# Patient Record
Sex: FEMALE | NOT HISPANIC OR LATINO | Employment: UNEMPLOYED | URBAN - METROPOLITAN AREA
[De-identification: names, ages, dates, MRNs, and addresses within clinical notes are randomized per-mention and may not be internally consistent; named-entity substitution may affect disease eponyms.]

---

## 2019-05-29 ENCOUNTER — COMMUNICATION - HEALTHEAST (OUTPATIENT)
Dept: SCHEDULING | Facility: CLINIC | Age: 24
End: 2019-05-29

## 2020-06-04 LAB — PHQ9 SCORE: 8

## 2021-01-14 LAB — PHQ9 SCORE: 16

## 2021-05-29 NOTE — TELEPHONE ENCOUNTER
"RN Triage:    Slipped and sustained a laceration in gap between right pinkie toe and adjoining toe about 15 minutes ago.  Friend says the cut is deep and gaping.  Still bleeding, though slowing down, after 15 minutes.  \"I'm afraid of blood.\"  Home care discussed.  She plans to go to the [facility] [place] for evaluation now.  Friend will drive her.    Kecia Henley, RN   [facility]    Reason for Disposition    Skin is split open or gaping  (or length > 1/2 inch or 12 mm on the skin, 1/4 inch or 6 mm on the face)    Protocols used: CUTS AND [id number]-GR      "

## 2021-06-11 ENCOUNTER — COMMUNICATION - HEALTHEAST (OUTPATIENT)
Dept: SCHEDULING | Facility: CLINIC | Age: 26
End: 2021-06-11

## 2021-06-25 NOTE — TELEPHONE ENCOUNTER
Abdominal cramps lead to diarrhea. Stomach is horrible.Vomited this morning too. I connected with scheduling for an appointment and advised urgent care if they can't get her in.  Lindsey Shah RN  [facility]      Reason for Disposition    Abdominal pain  (Exception: pain clears completely with each passage of diarrhea stool)    Additional Information    Negative: Shock suspected (e.g., cold/pale/clammy skin, too weak to stand, low BP, rapid pulse)    Negative: Difficult to awaken or acting confused (e.g., disoriented, slurred speech)    Negative: Sounds like a life-threatening emergency to the triager    Negative: Vomiting also present and worse than the diarrhea    Negative: Blood in stool and without diarrhea    Negative: SEVERE abdominal pain (e.g., excruciating) and present > 1 hour     Pain at 4.    Negative: SEVERE abdominal pain and age > 60    Negative: Bloody, black, or tarry bowel movements (Exception: chronic-unchanged black-grey bowel movements and is taking iron pills or Pepto-bismol)    Negative: SEVERE diarrhea (e.g., 7 or more times / day more than normal) and age > 60 years    Negative: Constant abdominal pain lasting > 2 hours    Negative: Drinking very little and has signs of dehydration (e.g., no urine > 12 hours, very dry mouth, very lightheaded)    Negative: Patient sounds very sick or weak to the triager    Negative: SEVERE diarrhea (e.g., 7 or more times / day more than normal) and present > 24 hours (1 day)    Negative: MODERATE diarrhea (e.g., 4-6 times / day more than normal) and present > 48 hours (2 days)    Negative: MODERATE diarrhea (e.g., 4-6 times / day more than normal) and age > 70 years    Protocols used: DIARRHEA-A-OH

## 2021-07-23 LAB
ABO (EXTERNAL): NORMAL
C TRACH DNA SPEC QL PROBE+SIG AMP: NEGATIVE
HEMOGLOBIN (EXTERNAL): 12.5 G/DL (ref 12–15.5)
HEPATITIS B SURFACE ANTIGEN (EXTERNAL): NEGATIVE
N GONORRHOEA DNA SPEC QL PROBE+SIG AMP: NEGATIVE
PLATELET COUNT (EXTERNAL): 296 10E3/UL (ref 150–450)
RH (EXTERNAL): POSITIVE
RUBELLA ANTIBODY IGG (EXTERNAL): NORMAL
SPECIMEN DESCRIP: NORMAL
SPECIMEN DESCRIPTION: NORMAL

## 2021-12-07 ENCOUNTER — HOSPITAL ENCOUNTER (INPATIENT)
Facility: CLINIC | Age: 26
Setting detail: SURGERY ADMIT
End: 2021-12-07
Attending: OBSTETRICS & GYNECOLOGY | Admitting: OBSTETRICS & GYNECOLOGY
Payer: COMMERCIAL

## 2021-12-16 ENCOUNTER — HOSPITAL ENCOUNTER (OUTPATIENT)
Facility: HOSPITAL | Age: 26
Discharge: HOME | End: 2021-12-16
Attending: OBSTETRICS & GYNECOLOGY | Admitting: OBSTETRICS & GYNECOLOGY
Payer: COMMERCIAL

## 2021-12-16 VITALS
SYSTOLIC BLOOD PRESSURE: 114 MMHG | RESPIRATION RATE: 16 BRPM | DIASTOLIC BLOOD PRESSURE: 66 MMHG | TEMPERATURE: 98.5 F | WEIGHT: 194 LBS | HEIGHT: 59 IN | BODY MASS INDEX: 39.11 KG/M2

## 2021-12-16 PROCEDURE — G0463 HOSPITAL OUTPT CLINIC VISIT: HCPCS

## 2021-12-16 RX ORDER — LIDOCAINE 40 MG/G
CREAM TOPICAL
Status: DISCONTINUED | OUTPATIENT
Start: 2021-12-16 | End: 2021-12-16 | Stop reason: HOSPADM

## 2021-12-16 ASSESSMENT — MIFFLIN-ST. JEOR: SCORE: 1530.61

## 2021-12-17 NOTE — DISCHARGE INSTRUCTIONS
Discharge Instruction for Undelivered Patients      You were seen for: Fetal Assessment  We Consulted: Dr De Leon  You had (Test or Medicine):NST- Reactive     Diet:   You may eat meals and snacks.     Activity:  Call your doctor or nurse midwife if your baby is moving less than usual.     Call your provider if you notice:  Swelling in your face or increased swelling in your hands or legs.  Headaches that are not relieved by Tylenol (acetaminophen).  Changes in your vision (blurring: seeing spots or stars.)  Nausea (sick to your stomach) and vomiting (throwing up).   Weight gain of 5 pounds or more per week.  Heartburn that doesn't go away.  Signs of bladder infection: pain when you urinate (use the toilet), need to go more often and more urgently.  The bag of boyle (rupture of membranes) breaks, or you notice leaking in your underwear.  Bright red blood in your underwear.  Abdominal (lower belly) or stomach pain.  For first baby: Contractions (tightening) less than 5 minutes apart for one hour or more.  Second (plus) baby: Contractions (tightening) less than 10 minutes apart and getting stronger.  *If less than 34 weeks: Contractions (tightening) more than 6 times in one hour.  Increase or change in vaginal discharge (note the color and amount)    Follow-up:  As scheduled in the clinic

## 2021-12-17 NOTE — PROGRESS NOTES
Patient fell up the stairs and lightly hit left side of abdomen on steps at 1500.  Pt states she 'still has some muscular discomfort but feels she twisted when she fell'.  NST done. Vitals stable. Dr De Leon contacted- OK to remove pt from the monitor at 1900 (4 hours after fall) as long as baby is reactive.  Pt and  educated to call provider if bleeding, leaking fluid, pain, change in fetal movement. Pt in agreement with plan and discharged home ambulatory with   Linsey Dc RN on 12/16/2021 at 7:14 PM

## 2022-01-05 ENCOUNTER — HOME INFUSION (PRE-WILLOW HOME INFUSION) (OUTPATIENT)
Dept: PHARMACY | Facility: CLINIC | Age: 27
End: 2022-01-05
Payer: COMMERCIAL

## 2022-01-10 ENCOUNTER — HOME INFUSION (PRE-WILLOW HOME INFUSION) (OUTPATIENT)
Dept: PHARMACY | Facility: CLINIC | Age: 27
End: 2022-01-10
Payer: COMMERCIAL

## 2022-02-02 DIAGNOSIS — Z11.59 ENCOUNTER FOR SCREENING FOR OTHER VIRAL DISEASES: Primary | ICD-10-CM

## 2022-02-22 NOTE — PROGRESS NOTES
This is a recent snapshot of the patient's [facility] medical record.  For current drug dose and complete information and questions, call [phone] or In Basket pool, fv home infusion ([id number])  CSN Number:  [id number]

## 2022-03-04 ENCOUNTER — TELEPHONE (OUTPATIENT)
Dept: PEDIATRICS | Facility: CLINIC | Age: 27
End: 2022-03-04
Payer: COMMERCIAL

## 2022-03-05 NOTE — TELEPHONE ENCOUNTER
Patient was seen for a lactation visit on 3/3 with infant. EPDS score of 11 during the visit. No concerns for self harm or harm to others.     Please call patient and ensure she has follow up with OB or PCP regarding postpartum mood in the next 1-2 weeks.    Thanks,  Claudia Aguilar, APRN, CPNP, IBCLC  [facility]  [facility]  3/4/2022, 11:34 PM

## 2022-03-07 NOTE — TELEPHONE ENCOUNTER
Contacted patient and relayed below message/recommendation.  Patient verbalized understanding and has no further questions.

## 2022-03-18 ENCOUNTER — MEDICAL CORRESPONDENCE (OUTPATIENT)
Dept: HEALTH INFORMATION MANAGEMENT | Facility: CLINIC | Age: 27
End: 2022-03-18
Payer: COMMERCIAL

## 2022-04-27 NOTE — PROGRESS NOTES
This is a recent snapshot of the patient's [facility] medical record.  For current drug dose and complete information and questions, call [phone] or In Basket pool, fv home infusion ([id number])  CSN Number:  [id number]

## 2022-05-18 LAB — PHQ9 SCORE: 23

## 2022-06-08 ENCOUNTER — HOSPITAL ENCOUNTER (EMERGENCY)
Facility: CLINIC | Age: 27
Discharge: HOME | End: 2022-06-08
Attending: EMERGENCY MEDICINE | Admitting: EMERGENCY MEDICINE
Payer: COMMERCIAL

## 2022-06-08 VITALS
TEMPERATURE: 98 F | HEART RATE: 88 BPM | DIASTOLIC BLOOD PRESSURE: 85 MMHG | WEIGHT: 170 LBS | OXYGEN SATURATION: 98 % | RESPIRATION RATE: 18 BRPM | SYSTOLIC BLOOD PRESSURE: 116 MMHG | BODY MASS INDEX: 34.34 KG/M2

## 2022-06-08 DIAGNOSIS — T50.904A DRUG OVERDOSE, UNDETERMINED INTENT, INITIAL ENCOUNTER: ICD-10-CM

## 2022-06-08 LAB
ALBUMIN SERPL-MCNC: 4.2 G/DL (ref 3.5–5)
ALP SERPL-CCNC: 99 U/L (ref 45–120)
ALT SERPL W P-5'-P-CCNC: 27 U/L (ref 0–45)
ANION GAP SERPL CALCULATED.3IONS-SCNC: 9 MMOL/L (ref 5–18)
APAP SERPL-MCNC: <3 UG/ML (ref 10–20)
AST SERPL W P-5'-P-CCNC: 19 U/L (ref 0–40)
BILIRUB SERPL-MCNC: 0.5 MG/DL (ref 0–1)
BUN SERPL-MCNC: 6 MG/DL (ref 8–22)
CALCIUM SERPL-MCNC: 9 MG/DL (ref 8.5–10.5)
CHLORIDE BLD-SCNC: 106 MMOL/L (ref 98–107)
CO2 SERPL-SCNC: 26 MMOL/L (ref 22–31)
CREAT SERPL-MCNC: 0.76 MG/DL (ref 0.6–1.1)
ETHANOL SERPL-MCNC: <10 MG/DL
GFR SERPL CREATININE-BSD FRML MDRD: >90 ML/MIN/1.73M2
GLUCOSE BLD-MCNC: 88 MG/DL (ref 70–125)
HCG SERPL QL: NEGATIVE
POTASSIUM BLD-SCNC: 4 MMOL/L (ref 3.5–5)
PROT SERPL-MCNC: 7.9 G/DL (ref 6–8)
SALICYLATES SERPL-MCNC: <8 MG/DL (ref 2–25)
SODIUM SERPL-SCNC: 141 MMOL/L (ref 136–145)

## 2022-06-08 PROCEDURE — 93005 ELECTROCARDIOGRAM TRACING: CPT | Performed by: EMERGENCY MEDICINE

## 2022-06-08 PROCEDURE — 82077 ASSAY SPEC XCP UR&BREATH IA: CPT | Performed by: EMERGENCY MEDICINE

## 2022-06-08 PROCEDURE — 80053 COMPREHEN METABOLIC PANEL: CPT | Performed by: EMERGENCY MEDICINE

## 2022-06-08 PROCEDURE — 36415 COLL VENOUS BLD VENIPUNCTURE: CPT | Performed by: EMERGENCY MEDICINE

## 2022-06-08 PROCEDURE — 80179 DRUG ASSAY SALICYLATE: CPT | Performed by: EMERGENCY MEDICINE

## 2022-06-08 PROCEDURE — 99285 EMERGENCY DEPT VISIT HI MDM: CPT | Mod: 25

## 2022-06-08 PROCEDURE — 84703 CHORIONIC GONADOTROPIN ASSAY: CPT | Performed by: EMERGENCY MEDICINE

## 2022-06-08 PROCEDURE — 90791 PSYCH DIAGNOSTIC EVALUATION: CPT

## 2022-06-08 PROCEDURE — 80143 DRUG ASSAY ACETAMINOPHEN: CPT | Performed by: EMERGENCY MEDICINE

## 2022-06-08 ASSESSMENT — ENCOUNTER SYMPTOMS
FATIGUE: 0
ABDOMINAL PAIN: 0
WEAKNESS: 0
NAUSEA: 1
CHILLS: 0
COUGH: 0
HEADACHES: 0
FEVER: 0
DYSURIA: 0
SHORTNESS OF BREATH: 0
DYSPHORIC MOOD: 1
VOMITING: 0

## 2022-06-08 NOTE — ED PROVIDER NOTES
EMERGENCY DEPARTMENT ENCOUNTER      NAME: Fatmata Catherine  AGE: 27 year old female  YOB: 1995  MRN: [id number]  EVALUATION DATE & TIME: 6/8/2022  1:15 PM    PCP: No Ref-Primary, Physician    ED PROVIDER: Cami Sy DO      Chief Complaint   Patient presents with     Ingestion         FINAL IMPRESSION:  1. Drug overdose, undetermined intent, initial encounter          ED COURSE & MEDICAL DECISION MAKING:    Pertinent Labs & Imaging studies reviewed. (See chart for details)    2:05 PM I met the patient and performed my initial interview and exam.  2:35 PM Spoke with Social Work (DEC).  4:07 PM Spoke with Social Work (DEC) who set the patient up for outpatient therapy. Patient contracts for safety. DEC also spoke with the patient's mother for collateral, who also contracts for the patient's safety.   4:10 PM Rechecked and updated the patient. I discussed plans for discharge with the patient, which they were agreeable to. We discussed supportive cares at home and reasons for return to the ER including new or worsening symptoms. All questions and concerns were addressed. Patient to be discharged by RN.    27 year old female presents to the Emergency Department for evaluation after an ingestion.  She reports that around 1230 today (1145 per nursing report) she placed 13 citalopram in her mouth.  She reports she felt them started to melt and spit the rest out.  She reports she had a fight with her boyfriend and wanted his attention.  Of note, patient did present to [facility] at the end of April after fight with her boyfriend reported suicidal statements.  Patient reports she is not suicidal.  She has been down and depressed since having her child, however denies any thoughts of going to hurt her self.  She notes that her daughter's father committed suicide in September and she would never do that to her child.  She reports she drank a couple glasses of wine last night but does not drink daily.   She denies any other drug ingestion.  Patient is calm and cooperative on exam today.  She denies any symptoms related to the ingestion.  She reports slightly blurred vision, however blurred vision is intact and she has no neurologic symptoms.  Toxicology labs are unremarkable, EKG is unremarkable.  Plan for DEC assessment.  Patient remains calm and cooperative.  DEC agrees that patient is at low risk for suicide, obtain collateral from her mother.  They will set out an outpatient in person appointment for her.  Patient is comfortable with this plan.  Continues to deny any suicidal ideation and contracts for safety.  Agrees to return if any acute worsening symptoms.    At the conclusion of the encounter I discussed the results of all of the tests and the disposition. The questions were answered. The patient or family acknowledged understanding and was agreeable with the care plan.       MEDICATIONS GIVEN IN THE EMERGENCY:  Medications - No data to display    NEW PRESCRIPTIONS STARTED AT TODAY'S ER VISIT  Discharge Medication List as of 6/8/2022  4:12 PM             =================================================================    HPI    Patient information was obtained from: patient    Use of : N/A      Fatmata Catherine is a 27 year old female with a pertinent medical history of depression with  anxety who presents to this ED via walk-in for evaluation of depression and ingestion.    Per Chart Review, patient was seen at the [facility] ED on 04/30/22 for evaluation of suicidal ideation. Patient brought by medics after argument with boyfriend, report by boyfriend that patient made a suicidal statement that patient denies having made. Vital signs notable for tachycardia and hypertension.  evaluated the patient and deemed that she was appropriate for discharge because she had no acute concerns regarding suicidal, mental health, or substance use. Patient was discharged with instructions  to follow up within 1 week with her primary care doctor and therapist.    Patient reports that earlier today se got in a fight with her boyfriend which prompted her to put approximately 13 tabs of Citalopram in her mouth. She denies swallowing any of the tablets, but notes that some of the tablets melted on her tongue. She states she then spit the rest of the tablets out and denies ingesting anything. She endorses mild blurry vision since ingestion, but she denies any abdominal pain or vomiting. She additionally endorses nausea, but she notes this is secondary to starting taking Citalopram. She reports that the medication is hers that she takes for postpartum depression since she became depressed ever since the birth of her son. She endorses going to therapy for her depression, but mentions that she attends therapy over Skype and so she has a hard time concentrating. She reports that she was seen at [facility] at the end of April 2022 after her boyfriend called 911 because she stated thoughts of killing herself, but she mentions that her daughter's father committed suicide in September and she states she would never do that to her children. She denies any current suicidal ideation and denies ever attempting suicide in the past. She reports she recently moved back in with her mother and siblings and notes she feels supported at home.  She denies any other complications at this time.       REVIEW OF SYSTEMS   Review of Systems   Constitutional: Negative for chills, fatigue and fever.   Eyes: Positive for visual disturbance (blurry vision).   Respiratory: Negative for cough and shortness of breath.    Cardiovascular: Negative for chest pain.   Gastrointestinal: Positive for nausea. Negative for abdominal pain and vomiting.   Genitourinary: Negative for dysuria.   Skin: Negative.    Neurological: Negative for syncope, weakness and headaches.   Psychiatric/Behavioral: Positive for dysphoric mood. Negative for  suicidal ideas.   All other systems reviewed and are negative.       PAST MEDICAL HISTORY:  History reviewed. No pertinent past medical history.    PAST SURGICAL HISTORY:  History reviewed. No pertinent surgical history.        CURRENT MEDICATIONS:    levonorgestrel-ethinyl estradiol (LUTERA, 28,) 0.1-20 mg-mcg per tablet  prenatal vitamin iron-folic acid 27mg-0.8mg (PRENATAL S) 27 mg iron- 800 mcg Tab tablet  UNABLE TO FIND         ALLERGIES:  Allergies   Allergen Reactions     Codeine Itching       FAMILY HISTORY:  No family history on file.    SOCIAL HISTORY:   Social History     Socioeconomic History     Marital status: Single   Tobacco Use     Smoking status: Never Smoker     Smokeless tobacco: Never Used   Substance and Sexual Activity     Alcohol use: Yes     Comment: Alcoholic Drinks/day: occasional     Drug use: Yes     Types: Marijuana     Sexual activity: Yes     Partners: Male     Birth control/protection: OCP       VITALS:  /85   Pulse 88   Temp 98  F (36.7  C) (Oral)   Resp 18   Wt 77.1 kg (170 lb)   LMP 06/07/2022 (Approximate)   SpO2 98%   BMI 34.34 kg/m      PHYSICAL EXAM    Physical Exam  Vitals and nursing note reviewed.   Constitutional:       General: She is not in acute distress.     Appearance: Normal appearance.   HENT:      Head: Normocephalic and atraumatic.   Eyes:      Pupils: Pupils are equal, round, and reactive to light.   Cardiovascular:      Rate and Rhythm: Normal rate and regular rhythm.   Pulmonary:      Effort: Pulmonary effort is normal.   Abdominal:      General: Abdomen is flat.   Musculoskeletal:         General: Normal range of motion.   Skin:     General: Skin is warm and dry.   Neurological:      General: No focal deficit present.      Mental Status: She is alert.      Gait: Gait normal.   Psychiatric:         Thought Content: Thought content does not include suicidal ideation. Thought content does not include suicidal plan.            LAB:  All pertinent labs  reviewed and interpreted.  Labs Ordered and Resulted from Time of ED Arrival to Time of ED Departure   COMPREHENSIVE METABOLIC PANEL - Abnormal       Result Value    Sodium 141      Potassium 4.0      Chloride 106      Carbon Dioxide (CO2) 26      Anion Gap 9      Urea Nitrogen 6 (*)     Creatinine 0.76      Calcium 9.0      Glucose 88      Alkaline Phosphatase 99      AST 19      ALT 27      Protein Total 7.9      Albumin 4.2      Bilirubin Total 0.5      GFR Estimate >90     ACETAMINOPHEN LEVEL - Abnormal    Acetaminophen <3.0 (*)    SALICYLATE LEVEL - Normal    Salicylate <8     HCG QUALITATIVE PREGNANCY - Normal    hCG Serum Qualitative Negative     ETHYL ALCOHOL LEVEL - Normal    Alcohol, Blood <10         RADIOLOGY:  Reviewed all pertinent imaging. Please see official radiology report.  No orders to display       EKG:    Performed at: 12:31 PM (08-JUN-2022)    Impression: Sinus rhythm with sinus arrhythmia. Normal ECG.    Rate: 89 BPM  Rhythm: Sinus rhythm with sinus arrhythmia.  P-R-T Axis: 29 63 39  [place] Interval: 144 ms  QRS Interval: 86 ms  QTc Interval: 418 ms  Comparison:  When compared with ECG of 30-JAN-2015 14:48, Nonspecific T wave abnormality, improved in Anterior leads.    I have independently reviewed and interpreted the EKG(s) documented above.      I, Franklin Wu, am serving as a scribe to document services personally performed by Dr. Cami Sy based on my observation and the provider's statements to me. I, Cami Sy, DO attest that Franklin Wu is acting in a scribe capacity, has observed my performance of the services and has documented them in accordance with my direction.    Cami Sy DO  Emergency Medicine  [facility] EMERGENCY ROOM  [address] [facility] [address] [phone]  Dept: [phone]       Cami Sy DO  06/08/22 [id number]

## 2022-06-08 NOTE — ED TRIAGE NOTES
Patient reports that @1145 today she put 12-15 citalopram in her mouth and felt them start to melt and spit them into the sink, she reports that she got into a fight with her boyfriend and wanted his attention and that is the reason for the possible ingestion, she denies suicidal ideation, patient reports that she has post partum depression.  Neena Marcial RN.......6/8/2022 12:26 PM     Triage Assessment     Row Name 06/08/22 1223       Triage Assessment (Adult)    Airway WDL WDL       Respiratory WDL    Respiratory WDL WDL       Skin Circulation/Temperature WDL    Skin Circulation/Temperature WDL WDL       Cardiac WDL    Cardiac WDL WDL       Peripheral/Neurovascular WDL    Peripheral Neurovascular WDL WDL       Cognitive/Neuro/Behavioral WDL    Cognitive/Neuro/Behavioral WDL WDL

## 2022-06-08 NOTE — DISCHARGE INSTRUCTIONS
Aftercare Plan      Recommendations:  [facility] ([facility]) recommends patient follow up with individual therapy, medication management, and continue with group counseling in the mother and baby group through [facility].      You have been scheduled the following appointments:   Date: Tuesday, 6/14/2022  Time: 10:00 am - 11:00 am  Provider: Mark Yuen EdD, MS  [facility]  Location: [facility], [address] [place] [address]  Phone: [phone]  Type: Therapy - Initial (In-Person)    Scheduling Instructions  Please notify patients that someone from [facility] will be contacting them via email &/or phone to complete the scheduling process. To complete all necessary documentation prior to the initial appointment, clients will receive an email link to access the Simple Practice Portal. Please provide contact information including the best email & phone number.      [facility] maintains an extensive network of licensed behavioral health providers to connect patients with the services they need.  We do not charge providers a fee to participate in our referral network.  We match patients with providers based on a patient s specific needs, insurance coverage, and location.  Our first effort will be to refer you to a provider within your care system, and will utilize providers outside your care system as needed.           If I am feeling unsafe or I am in a crisis, I will:  Talk with mother or significant other    Contact my established care providers   Call the National Suicide Prevention Lifeline: 874.409.8514   Go to the nearest emergency room   Call 182          Warning signs that I or other people might notice when a crisis is developing for me:  Intrusive suicide thoughts, persistent worsening of depression    Things I am able to do on my own to cope or help me feel better:  Take a nature walk, exercise, listen to soothing music    Things that I am able to do with others  "to cope or help me better:  Spend quality time with family or friends, movies, out to dinner, coffee shops    Things I can use or do for distraction:   Take a nature walk, listen to soothing music, talk therapy    Changes I can make to support my mental health and wellness: Participate in in-person therapy and continue with current providers at [facility]. Consider a more intensive outpatient program at [facility].    People in my life that I can ask for help:  Rely on mother or significant other for support    Your On license of [facility] has a mental health crisis team you can call 24/7: [facility] Mobile Crisis  [phone] (adults)  [phone] (children)    Walk-In Counseling Center (teletherapy and virtual visits)     [address] [place] [address]  [phone]  [email or web address]      Crisis Lines  Crisis Text Line  Text 394103  You will be connected with a trained live crisis counselor to provide support.    Por espanol, texto  SCOTT a 545121 o texto a 442-AYUDAME en WhatsApp    [place] [facility] Line  1.800.SUICIDE [6672826]      Community Resources  Fast Tracker  Linking people to mental health and substance use disorder resources  fasttrackermn.org     [place] [facility] Warm Line  Peer to peer support  Monday thru Saturday, 12 pm to 10 pm  190.271.3118 or 5.893.415.0134  Text \"Support\" to 13568    National [place] [employer] Mental [employer]ness (ZACH)  894.242.8483 or 1.888.ZACH.HELPS        Additional Information  Today you were seen by a licensed mental health professional through Triage and Transition services, [facility] ([facility])  for a crisis assessment in the Emergency Department at [facility].  It is recommended that you follow up with your established providers (psychiatrist, mental health therapist, and/or primary care doctor - as relevant) as soon as possible. Coordinators from P will be calling you in the next 24-48 hours to ensure that you " have the resources you need.  You can also contact [facility] coordinators directly at [phone]. You may have been scheduled for or offered an appointment with a mental health provider. [facility] maintains an extensive network of licensed behavioral health providers to connect patients with the services they need.  We do not charge providers a fee to participate in our referral network.  We match patients with providers based on a patient's specific needs, insurance coverage, and location.  Our first effort will be to refer you to a provider within your care system, and will utilize providers outside your care system as needed.             Walk-In Counseling Center (Volunteer Counseling, Free Services )   [phone]  [email or web address].org/    Due to the COVID-19 virus, all walk-in clinics have moved to a [employer] enabled online computer or phone application.  We will not be able to provide face-to-face walk-in services, but our clinics are open!    BY PHONE  To attend any clinic by phone, call one of these numbers.  If you get a busy signal, go to the next:    +7    +7    +2   +9   +0   +5   (These are not toll-free numbers.)    When prompted, enter this Meeting ID: [phone]    BY COMPUTER  To join by computer go to [email or web address]  Then  Join a Meeting .    CLINIC TIMES  The counseling clinics are open at the following times:    Monday 1-3 PM and 5-8:30 PM  Tuesday 6-8:30 PM   Wednesday 1-3 PM and 5-8:30 PM  Thursday 6:30-8:30 PM  Friday 1-3 PM

## 2022-06-08 NOTE — CONSULTS
6/8/2022  Fatmata Catherine 1995     [facility] Crisis Assessment    Patient was assessed: remote  Patient location:  ED at [facility]   Was a release of information signed: Yes. Providers included on the release: primary provider at [facility], [facility], Any provider scheduled by [facility]    Referral Data and Chief Complaint  Fatmata Catherine is a 27 year old who uses she and her pronouns. Patient presented to the ED via EMS and was referred to the ED by family/friends. Patient is presenting to the ED for the following concerns: ingestion and increased depression and anxiety.      Informed Consent and Assessment Methods    Patient is her own guardian. Writer met with patient and explained the crisis assessment process, including applicable information disclosures and limits to confidentiality, assessed understanding of the process, and obtained consent to proceed with the assessment. Patient was observed to be able to participate in the assessment as evidenced by being cooperative and able to respond to questions. Assessment methods included conducting a formal interview with patient, review of medical records, collaboration with medical staff, and obtaining relevant collateral information from family and community providers when available.    Narrative Summary   What led to the patient presenting for crisis services, factors that make the crisis life threatening or complex, stressors, how is this disrupting the patient's life, and how current functioning is in comparison to baseline. How is patient presenting during the assessment. Duration of the current crisis, coping skills attempted to reduce the crisis, community resources used, and past presentations.      The patient was seen today at [facility] ED, by the [facility] ([facility]), through virtual crisis assessment. The patient is alert, oriented, calm, and cooperative. Thought processes are organized. She seems honest and forthcoming.  Affect and eye contact are appropriate. She denies suicidal ideation, plans, or intent. Indicates she has multiple reasons to live including her children, mother, and other family. She reports no symptoms of kyle and no symptoms of psychosis. Denies homicidal ideation  Denies use of alcohol or illicit drugs.      Patient reports today she had an argument or disagreement with significant other and she put 12 to 15 citalopram in her mouth and spit them in the sink once they started to melt. She states she did this because she wanted to get his attention as reason for this behavior. She states she had a baby three months ago and has postpartum depression.     She denies previous psychiatric hospitalizations, suicide attempts, and denies self injurious behaviors. She participates in a mother and baby counseling support group through [facility] along with individual and psychiatry.         Collateral Information  DEC  spoke with patient's mother Bety for collateral information. Bety indicates she has been concerned as patient has been struggling more with mental health and feels like she is not receiving the most appropriate services because they are all virtual or on the phone. She states that patient needs more intensive outpatient and psychiatry. States her medications are not working and probably needs adjusting. Reports that she feels like patient was acting out today for attention seeking. She states relationship seems toxic and states that she is under the impression that patient's significant other wants out of the relationship. States that whenever he talks about ending the relationship then patient acts out or has behaviors such as these.     Risk Assessment    Risk of Harm to Self     ESS-6  1.a. Over the past 2 weeks, have you had thoughts of killing yourself? No  1.b. Have you ever attempted to kill yourself and, if yes, when did this last happen? No   2. Recent or current suicide  plan? No   3. Recent or current intent to act on ideation? No  4. Lifetime psychiatric hospitalization? No  5. Pattern of excessive substance use? No  6. Current irritability, agitation, or aggression? No  Scoring note: BOTH 1a and 1b must be yes for it to score 1 point, if both are not yes it is zero. All others are 1 point per number. If all questions 1a/1b - 6 are no, risk is negligible. If one of 1a/1b is yes, then risk is mild. If either question 2 or 3, but not both, is yes, then risk is automatically moderate regardless of total score. If both 2 and 3 are yes, risk is automatically high regardless of total score.     Score: 0, negligible risk    The patient has the following risk factors for suicide: depressive symptoms and other conflict with significant other    Is the patient experiencing current suicidal ideation: No    Is the patient engaging in preparatory suicide behaviors (formulating how to act on plan, giving away possessions, saying goodbye, displaying dramatic behavior changes, etc)? No    Does the patient have access to firearms or other lethal means? no    The patient has the following protective factors: social support, voluntarily seeking mental health support, established relationship community mental health provider(s), future focused thinking, expresses desire to engage in treatment, sense of obligation to people/pets and safe/stable housing    Support system information:  Relies on mother, significant other, and other family for supports    Patient strengths: Family, her children     Does the patient engage in non-suicidal self-injurious behavior (NSSI/SIB)? no    Is the patient vulnerable to sexual exploitation?  No    Is the patient experiencing abuse or neglect? no    Is the patient a vulnerable adult? No      Risk of Harm to Others  The patient has the following risk factors of harm to others: no risk factors identified    Does the patient have thoughts of harming others? No    Is the  patient engaging in sexually inappropriate behavior?  no       Current Substance Abuse    Is there recent substance abuse? no    Was a urine drug screen or blood alcohol level obtained: No    CAGE AID  Have you felt you ought to cut down on your drinking or drug use?  No  Have people annoyed you by criticizing your drinking or drug use? No  Have you felt bad or guilty about your drinking or drug use? No  Have you ever had a drink or used drugs first thing in the morning to steady your nerves or to get rid of a hangover? No  Score: 0/4       Current Symptoms/Concerns    Symptoms  Attention, hyperactivity, and impulsivity symptoms present: No    Anxiety symptoms present: Yes: Generalized Symptoms: Excessive worry and Physiological anxiety - sweating, flushing, shaking, shortness of breath, or racing heart      Appetite symptoms present: No     Behavioral difficulties present: No     Cognitive impairment symptoms present: No    Depressive symptoms present: Yes Appetite change/weight change , Crying or feels like crying, Depressed mood, Feelings of helplessness , Impaired concentration, Impaired decision making  and Sleep disturbance      Eating disorder symptoms present: No    Learning disabilities, cognitive challenges, and/or developmental disorder symptoms present: No     Manic/hypomanic symptoms present: No    Personality and interpersonal functioning difficulties present : No    Psychosis symptoms present: No      Sleep difficulties present: Yes: Excessive sleep     Substance abuse disorder symptoms present: No     Trauma and stressor related symptoms present: No       Mental Status Exam   Affect: Appropriate   Appearance: Appropriate    Attention Span/Concentration: Attentive?    Eye Contact: Engaged   Fund of Knowledge: Appropriate    Language /Speech Content: Fluent   Language /Speech Volume: Normal    Language /Speech Rate/Productions: Normal    Recent Memory: Intact   Remote Memory: Intact   Mood: Anxious     Orientation to Person: Yes    Orientation to Place: Yes   Orientation to Time of Day: Yes    Orientation to Date: Yes    Situation (Do they understand why they are here?): Yes    Psychomotor Behavior: Normal    Thought Content: Clear   Thought Form: Goal Directed       Mental Health and Substance Abuse History    History  Current and historical diagnoses or mental health concerns:  Depression, anxiety    Prior MH services (inpatient, programmatic care, outpatient, etc) : Yes no previous psychiatric hospitalizations, currently has outpatient providers through [facility]    Has the patient used [facility] crisis team services before?: No    History of substance abuse: No    Prior KULWINDER services (inpatient, programmatic care, detox, outpatient, etc) : No    History of commitment: No    Family history of MH/KULWINDER: No    Trauma history: No    Medication  Psychotropic medications:  Citalopralm, recently increased from 20 to 40mg    Current Care Team  Primary Care Provider:  Analilia Olea PA-C  At [facility]    Psychiatrist:  MATT Smith, SHEYLA, CNS at [facility]    Therapist:  Kristin Spencer at [facility]    : No    CTSS or ARMHS: No    ACT Team: No    Other: No    Biopsychosocial Information    Socioeconomic Information  Current living situation:  Patient resides with boyfriend at her mother's home    Employment/income source: unemployed currently    Relevant legal issues: none    Cultural, Catholic, or spiritual influences on mental health care: Shinto    Is the patient active in the  or a : No      Relevant Medical Concerns   Patient identifies concerns with completing ADLs? No     Patient can ambulate independently? Yes     Other medical concerns? No     History of concussion or TBI? No        Diagnosis     F33.9 Major depressive disorder, recurrent, unspecified, by history      Therapeutic Intervention  The following therapeutic  methodologies were employed when working with the patient: establishing rapport, active listening, assessing dimensions of crisis, establishing a discharge plan and safety planning. Patient response to intervention:  Able to follow up with established plan for safety and aftercare.      Disposition  Recommended disposition: Individual Therapy and Medication Management      Reviewed case and recommendations with attending provider. Attending Name:  Cami Sy MD      Attending concurs with disposition: Yes      Patient concurs with disposition: Yes      Guardian concurs with disposition: NA     Final disposition: Individual therapy  and Medication management.       Clinical Substantiation of Recommendations   Rationale with supporting factors for disposition and diagnosis.     Patient is recommended to follow up with individual therapy and medication managmentt. She is not considered to be an imminent risk of danger to self or others. She is able to follow up with safety and aftercare planning. She denies suicidal or homicidal ideation. She demonstrates future oriented thinking.       Assessment Details  Patient interview started at: 2:30pm and completed at: 3:30pm.    Total duration spent on the patient case in minutes: .75 hrs     CPT code(s) utilized: 93022 - Psychotherapy for Crisis - 60 (30-74*) min       Aftercare and Safety Planning  Follow up plans with MH/KULWINDER services: Yes scheduled for individual therapy      Aftercare plan placed in the AVS and provided to patient: Yes. Given to patient by ED staff and nursing         [facility]              Aftercare Plan        Recommendations:  [facility] ([facility]) recommends patient follow up with individual therapy, medication management, and continue with group counseling in the mother and baby group through [facility].        You have been scheduled the following appointments:   Date: Tuesday, 6/14/2022  Time: 10:00 am  - 11:00 am  Provider: Mark Yuen EdD, MS  [facility]  Location: [employer] [facility], [address] [place] [address]  Phone: [phone]  Type: Therapy - Initial (In-Person)     Scheduling Instructions  Please notify patients that someone from [facility] will be contacting them via email &/or phone to complete the scheduling process. To complete all necessary documentation prior to the initial appointment, clients will receive an email link to access the Simple Practice Portal. Please provide contact information including the best email & phone number.        [facility] maintains an extensive network of licensed behavioral health providers to connect patients with the services they need.  We do not charge providers a fee to participate in our referral network.  We match patients with providers based on a patient s specific needs, insurance coverage, and location.  Our first effort will be to refer you to a provider within your care system, and will utilize providers outside your care system as needed.               If I am feeling unsafe or I am in a crisis, I will:  Talk with mother or significant other     Contact my established care providers   Call the National Suicide Prevention Lifeline: 423.409.4298   Go to the nearest emergency room   Call 910            Warning signs that I or other people might notice when a crisis is developing for me:  Intrusive suicide thoughts, persistent worsening of depression     Things I am able to do on my own to cope or help me feel better:  Take a nature walk, exercise, listen to soothing music     Things that I am able to do with others to cope or help me better:  Spend quality time with family or friends, movies, out to dinner, coffee shops     Things I can use or do for distraction:   Take a nature walk, listen to soothing music, talk therapy     Changes I can make to support my mental health and wellness: Participate in in-person therapy and continue with current  "providers at [facility]. Consider a more intensive outpatient program at [facility].     People in my life that I can ask for help:  Rely on mother or significant other for support     Your [facility] has a mental health crisis team you can call 24/7: [facility] Mobile Crisis  [phone] (adults)  [phone] (children)     Walk[facility]In Counseling Center (teletherapy and virtual visits)     [address] [place] [address]  [phone]  [email or web address]        Crisis Lines  Crisis Text Line  Text 954817  You will be connected with a trained live crisis counselor to provide support.     Por espanol, texto  SCOTT a 630229 o texto a 442-AYUDAME en WhatsApp     [place] [facility] Line  1.800.SUICIDE [3395616]        Community Resources  Fast Tracker  Linking people to mental health and substance use disorder resources  fasttrackBudgen.org      [place] Mental [facility] Warm Line  Peer to peer support  Monday thru Saturday, 12 pm to 10 pm  432.971.7265 or 8.278.555.1017  Text \"Support\" to 77942     National Manter on Mental Illness (ZACH)  409.553.6862 or 1.888.ZACH.HELPS           Additional Information  Today you were seen by a licensed mental health professional through Triage and Transition services, [facility] (P)  for a crisis assessment in the Emergency Department at [facility].  It is recommended that you follow up with your established providers (psychiatrist, mental health therapist, and/or primary care doctor - as relevant) as soon as possible. Coordinators from [facility] will be calling you in the next 24-48 hours to ensure that you have the resources you need.  You can also contact [facility] coordinators directly at [phone]. You may have been scheduled for or offered an appointment with a mental health provider. [facility] maintains an extensive network of licensed behavioral health providers to connect patients with the services they need.  We do not " charge providers a fee to participate in our referral network.  We match patients with providers based on a patient's specific needs, insurance coverage, and location.  Our first effort will be to refer you to a provider within your care system, and will utilize providers outside your care system as needed.                 Walk[facility]In Counseling Center (Volunteer Counseling, Free Services )   [phone]  https://walkin.org/     Due to the COVID-19 virus, all walk-in clinics have moved to a [employer] enabled online computer or phone application.  We will not be able to provide face-to-face walk-in services, but our clinics are open!     BY PHONE  To attend any clinic by phone, call one of these numbers.  If you get a busy signal, go to the next:    +5    +1    +0   +9   +9   +7   (These are not toll-free numbers.)     When prompted, enter this Meeting ID: [phone]     BY COMPUTER  To join by computer go to [email or web address]  Then  Join a Meeting .     CLINIC TIMES  The counseling clinics are open at the following times:     Monday 1-3 PM and 5-8:30 PM  Tuesday 6-8:30 PM   Wednesday 1-3 PM and 5-8:30 PM  Thursday 6:30-8:30 PM  Friday 1-3 PM

## 2022-06-10 LAB
ATRIAL RATE - MUSE: 89 BPM
DIASTOLIC BLOOD PRESSURE - MUSE: NORMAL MMHG
INTERPRETATION ECG - MUSE: NORMAL
P AXIS - MUSE: 29 DEGREES
PR INTERVAL - MUSE: 144 MS
QRS DURATION - MUSE: 86 MS
QT - MUSE: 344 MS
QTC - MUSE: 418 MS
R AXIS - MUSE: 63 DEGREES
SYSTOLIC BLOOD PRESSURE - MUSE: NORMAL MMHG
T AXIS - MUSE: 39 DEGREES
VENTRICULAR RATE- MUSE: 89 BPM

## 2022-09-23 ENCOUNTER — MEDICAL CORRESPONDENCE (OUTPATIENT)
Dept: HEALTH INFORMATION MANAGEMENT | Facility: CLINIC | Age: 27
End: 2022-09-23

## 2022-09-23 ENCOUNTER — TRANSFERRED RECORDS (OUTPATIENT)
Dept: HEALTH INFORMATION MANAGEMENT | Facility: CLINIC | Age: 27
End: 2022-09-23

## 2022-09-23 ENCOUNTER — TRANSCRIBE ORDERS (OUTPATIENT)
Dept: OTHER | Age: 27
End: 2022-09-23

## 2022-09-23 DIAGNOSIS — E66.01 MORBID OBESITY DUE TO EXCESS CALORIES (H): Primary | ICD-10-CM

## 2022-09-29 NOTE — TELEPHONE ENCOUNTER
REFERRAL INFORMATION:    Referring Provider:  Dr. Zain Julio     Referring Clinic:  [facility]     Reason for Visit/Diagnosis: New MWM. BMI 38.2       FUTURE VISIT INFORMATION:    Appointment Date: 10/5/2022    Appointment Time: 10 AM      NOTES RECORD STATUS  DETAILS   OFFICE NOTE from Referring Provider Received  9/23/2022 Office visit with Dr. Zain Julio     OFFICE NOTE from Other Specialists N/A    HOSPITAL DISCHARGE SUMMARY/ ED VISITS  N/A    OPERATIVE REPORT N/A    ENDOSCOPY (EGD)  N/A    PERTINENT LABS Care Everywhere/ Interna     PATHOLOGY REPORTS (RELATED) N/A    IMAGING (CT, MRI, US, XR)  N/A      9/29/2022 2:07pm Fax request sent to [facility] ([phone]) [place] for med recs. -Bhao     10/3/2022 5:17pm Fax request sent to [facility] ([phone]) for med recs.-Bhao     10/4/2022 9:21am Received recs from [facility]; sent to scan. A copy was placed in Cristal's folder in Right Fax. -Mino

## 2022-10-01 ENCOUNTER — HEALTH MAINTENANCE LETTER (OUTPATIENT)
Age: 27
End: 2022-10-01
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
## 2022-10-05 ENCOUNTER — VIRTUAL VISIT (OUTPATIENT)
Dept: ENDOCRINOLOGY | Facility: CLINIC | Age: 27
End: 2022-10-05
Payer: COMMERCIAL

## 2022-10-05 ENCOUNTER — TELEPHONE (OUTPATIENT)
Dept: ENDOCRINOLOGY | Facility: CLINIC | Age: 27
End: 2022-10-05

## 2022-10-05 ENCOUNTER — PRE VISIT (OUTPATIENT)
Dept: ENDOCRINOLOGY | Facility: CLINIC | Age: 27
End: 2022-10-05

## 2022-10-05 VITALS — HEIGHT: 59 IN | WEIGHT: 189 LBS | BODY MASS INDEX: 38.1 KG/M2

## 2022-10-05 DIAGNOSIS — E66.01 CLASS 2 SEVERE OBESITY WITH SERIOUS COMORBIDITY AND BODY MASS INDEX (BMI) OF 38.0 TO 38.9 IN ADULT, UNSPECIFIED OBESITY TYPE (H): ICD-10-CM

## 2022-10-05 DIAGNOSIS — E66.812 CLASS 2 SEVERE OBESITY WITH SERIOUS COMORBIDITY AND BODY MASS INDEX (BMI) OF 38.0 TO 38.9 IN ADULT, UNSPECIFIED OBESITY TYPE (H): ICD-10-CM

## 2022-10-05 DIAGNOSIS — Z71.3 NUTRITIONAL COUNSELING: Primary | ICD-10-CM

## 2022-10-05 PROBLEM — F33.42 MAJOR DEPRESSIVE DISORDER, RECURRENT EPISODE, IN FULL REMISSION (H): Status: ACTIVE | Noted: 2022-10-05

## 2022-10-05 PROBLEM — F90.9 ADHD (ATTENTION DEFICIT HYPERACTIVITY DISORDER): Status: ACTIVE | Noted: 2022-10-05

## 2022-10-05 PROBLEM — F60.3 BORDERLINE PERSONALITY DISORDER (H): Status: ACTIVE | Noted: 2022-10-05

## 2022-10-05 PROBLEM — F41.9 ANXIETY: Status: ACTIVE | Noted: 2022-10-05

## 2022-10-05 PROBLEM — L73.2 HIDRADENITIS SUPPURATIVA: Status: ACTIVE | Noted: 2022-10-05

## 2022-10-05 PROCEDURE — 97802 MEDICAL NUTRITION INDIV IN: CPT | Mod: GT | Performed by: DIETITIAN, REGISTERED

## 2022-10-05 PROCEDURE — 99204 OFFICE O/P NEW MOD 45 MIN: CPT | Mod: GT | Performed by: NURSE PRACTITIONER

## 2022-10-05 RX ORDER — CITALOPRAM HYDROBROMIDE 40 MG/1
40 TABLET ORAL DAILY
COMMUNITY
Start: 2022-09-08

## 2022-10-05 RX ORDER — NYSTATIN 100000 [USP'U]/G
POWDER TOPICAL
COMMUNITY
Start: 2022-03-29 | End: 2023-11-06

## 2022-10-05 RX ORDER — CLINDAMYCIN PHOSPHATE 10 MG/G
GEL TOPICAL
COMMUNITY
Start: 2022-09-07 | End: 2023-11-06

## 2022-10-05 RX ORDER — BUPROPION HYDROCHLORIDE 150 MG/1
150 TABLET, EXTENDED RELEASE ORAL
COMMUNITY
Start: 2022-03-29 | End: 2023-11-06

## 2022-10-05 RX ORDER — CHOLECALCIFEROL (VITAMIN D3) 50 MCG
1 TABLET ORAL DAILY
COMMUNITY
Start: 2022-05-11 | End: 2023-11-06

## 2022-10-05 RX ORDER — ALBUTEROL SULFATE 90 UG/1
AEROSOL, METERED RESPIRATORY (INHALATION)
COMMUNITY
Start: 2022-09-22 | End: 2023-11-06
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
## 2022-10-05 NOTE — TELEPHONE ENCOUNTER
Prior authorization is needed. Please start PA. Thank you!    HP PMAP     RX BIN: [id number]  RX PCN: [id number]  RX ID: [id number]  RX GRP: [id number]

## 2022-10-05 NOTE — TELEPHONE ENCOUNTER
Prior Authorization Approval    Authorization Effective Date: 9/5/2022  Authorization Expiration Date: 4/5/2023  Medication: Saxenda-PA APPROVED   Approved Dose/Quantity:   Reference #:     Insurance Company: [employer] - Phone [phone] Fax [phone]  Expected CoPay:       CoPay Card Available:      Foundation Assistance Needed:    Which Pharmacy is filling the prescription (Not needed for infusion/clinic administered): [id number] - [place], [place] - [address] AT [facility] [address]  Pharmacy Notified: Yes- **Instructed pharmacy to notify patient when script is ready to /ship.**-Pharmacy requesting script with refills be sent to filling pharmacy to process to fill. Pharmacy will notify the patient when medication is ready for .   Patient Notified: Yes

## 2022-10-05 NOTE — TELEPHONE ENCOUNTER
"Prior Authorization Retail Medication Request    Medication/Dose: Saxenda  ICD code (if different than what is on RX):  Class 2 severe obesity with serious comorbidity and body mass index (BMI) of 38.0 to 38.9 in adult, unspecified obesity type (H) [E66.01, Z68.38]     Previously Tried and Failed:  Watching Portions or Calories, Exercise, Weight Watchers, Atkins-type Diet (Low Carb/High Protein), Medications    Rationale:  I had the pleasure of seeing your patient, Fatmata Catherine. Full intake/assessment was done to determine barriers to weight loss success and develop a treatment plan. Fatmata Catherine is a 27 year old female interested in treatment of medical problems associated with excess weight. She has a height of 4' 11\", a weight of 189 lbs 0 oz, and the calculated Body mass index is 38.17 kg/m .    Has always struggled with yoyo weight. When 19 was only able to lose weight with working out 3-4hr a day and watching very restrictive. Was able to lose 60lbs, but was hard to sustain. Has tried other diets with little help. Has been a gradual weight gain over the years.      Highest weight was 215 after pregnancy. Has stayed around 200. Lowest weight of 119lbs.        Insurance Name:    Insurance ID:        Pharmacy Information (if different than what is on RX)  Name:  [id number] - [place], [place] - [facility] AT [address]  Phone:  [phone]  "

## 2022-10-05 NOTE — NURSING NOTE
"Chief Complaint   Patient presents with     Consult     New consultation for weight management.         Vitals:    10/05/22 0932   Weight: 189 lb   Height: 4' 11\"       Body mass index is 38.17 kg/m .      Nela Boo, EMT  [facility]                      "

## 2022-10-05 NOTE — ASSESSMENT & PLAN NOTE
Struggle with weight since childhood. Was able to lose 60lbs with intense diet and exercise. Highest weight 215 after two pregnancies. Was taking Ozempic 0.5mg for two months obtained from an outside source, with good results. Was able to lose 10lbs and denies side effects. Helped decrease hunger and felt atkins faster. Current diet includes high carb options and will door dash most dinners to work.

## 2022-10-05 NOTE — PATIENT INSTRUCTIONS
"Vishal Avilez!    Follow-up with RD in 1 month    Thank you,    Renetta Schaefer, ILAN, LD  If you would like to schedule or reschedule an appointment with the RD, please call [phone]    Nutrition Goals  1) 9\" Plate method (1/2 plate non-starchy vegetables/fruit, 1/4 plate lean protein, 1/4 plate whole grain starch - no more than 1/2 cup carb/meal)  2) Consume 60-90 g protein per day  3) Consume at least 48-64 oz of water   4) Exercise 3-4x per week  5) Easy recipes: https://www.Triblio/recipes/  Low Calorie Frozen Meal:  Healthy Choice Power Bowls  Lean Cuisine  Smart Ones  Juan Cruz Ayad  [place] Family Dinners    6) Avoid snacking as able. If snack is needed use lean protein and/or fruit/vegetable. Examples:   - 2 cup popcorn   - 1 cup mixed berries   - 15 almonds, walnuts, cashews   - carrot/celery sticks and 2 tbsp low-fat ranch   - 1 hard boiled egg   - Part-skim mozzarella cheese stick   - Low-fat, low-sugar greek yogurt with 1/2 cup berries   - Med apple or pear   - sliced bell peppers with 1/2 cup salsa   - 1/2 cup roasted chickpeas   - sliced cucumbers with vinegar    100 calorie sweets: Smart Sweets, Fiber One desserts, Fit and Active 100 calorie snack sweets at [facility]; Nabisco 100 calorie pre-portioned cookies, sugar-free pudding, sugar-free jello.    Meal Replacement Shake Options:   *Protein Shake Criteria: no more than 210 Calories, at least 20 grams of protein, and less than 10 grams of sugar   [facility] smoothie (160 Calories, 20 g protein)   Premier Protein (160 Calories, 30 g protein)  Slim Fast Advanced Nutrition (180 Calories, 20 g protein)  Muscle Milk, lactose-free, 17 oz bottle (210 Calories, 30 g protein)  Integrated Supplements, no artificial sugars (110 Calories, 20 g protein)  Genepro, unflavored protein powder (60 Calories, 30 g protein)  Boost/Ensure Max (160 calories, 30 gm protein)   Foxtrot Core Power (170 calories, 26 gm protein)  Aldi's Elevation Protein Powder (180 " calories, 30 gm protein)     Meal Replacement Bar Options:   [facility] Protein Shake (160 Calories, 15 g protein)  Quest Protein Bars (190 Calories, 20 g protein)  Built Bar (170 Calories, 15-20 g protein)  One Protein Bar (210 calories, 20 g protein)  [place]aw Signature Protein Bar ([employer]co) (190 Calories, 21 g protein)  Pure Protein Bars (180 Calories, 21 g protein)  Think! Smores Bar      The Plate Method  [email or web address]    Protein Sources   http://"Knightscope[employer] [email or web address][employer][email or web address]     Carbohydrates  [email or web address]     Mindful Eating  http://"Knightscope, [email or web address]."/[email or web address]     Summary of Volumetrics Eating Plan  [email or web address]     Interested in working with a health ? Health coaches work with you to improve your overall health and wellbeing. They look at the whole person, and may involve discussion of different areas of life, including, but not limited to the four pillars of health (sleep, exercise, nutrition, and stress management). Discuss with your care team if you would like to start working a health .    Health Coaching-3 Pack:    $99 for three health coaching visits    Visits may be done in person or via phone    Coaching is a partnership between the  and the client; Coaches do not prescribe or diagnose    Coaching helps inspire the client to reach his/her personal goals    COMPREHENSIVE WEIGHT MANAGEMENT PROGRAM  VIRTUAL SUPPORT GROUPS    For Support Group Information:      We offer support groups for patients who are working on weight loss and considering, preparing for or have had weight loss surgery.   There is no cost for this opportunity.  You are invited to attend the?Virtual Support Groups?provided by any of the following locations:    [facility] via Microsoft Teams with Nicolle Monroe RN  2.   [place] via [employer] with Grabiel Chua, PhD, LP  3.   [place] via [employer] with Leslie Alonso RN  4.   [facility] via Microsoft Teams  "with Leslie Collins [facility]    The following Support Group information can also be found on our website:  [email or web address]      [facility] Weight Loss Surgery Support Group    [facility] Weight Loss Surgery Support Group  The support group is a patient-lead forum that meets monthly to share experiences, encouragement and education. It is open to those who have had weight loss surgery, are scheduled for surgery, and those who are considering surgery.   WHEN: This group meets on the 3rd Wednesday of each month from 5:00PM - 6:00PM virtually using Microsoft Teams.   FACILITATOR: Led by Nicolle Monroe RD, LD, RN, the program's Clinical Coordinator.   TO REGISTER: Please contact the clinic via [employer] or call the nurse line directly at [phone] to inform our staff that you would like an invite sent to you and to let us know the email you would like the invite sent to. Prior to the meeting, a link with directions on how to join the meeting will be sent to you.    2022 Meetings  January 19: \"Let's Talk\" a time for the group to share.  February 16: \"Let's Talk\" a time for the group to share.  March 16: Guest Speakers: Psychologists, Cat Cadena, PhD,LP and Angelica Pena, PsyD,  April 20: Guest Speaker: Health Mirela, [facility],CHES, CPT  May 18: Guest Speaker: DietitianPapi, ILAN, LP  Dina 15: \"Let's Talk\" a time for the group to share.  July 20: \"Let's Talk\" a time for the group to share.  August 17: TBA  September 21: TBA  October 19: Guest Speaker: Dr Buster Martini MD Pulmonologist and Sleep Medicine Physician, \"Getting a Good Night's Sleep\".  November 16: TBA  December 21: TBA    [facility] Support Groups    Connections: Bariatric Care Support Group?  This is open to all [facility] (and those external to this program) pre- and post- operative bariatric surgery " "patients as well as their support system.   WHEN: This group meets the 2nd Tuesday of each month from 6:30 PM - 8:00 PM virtually using Microsoft Teams.   FACILITATOR: Led by Grabiel Chua, Ph.D who is a Licensed Psychologist with the [facility] Comprehensive Weight Management Program.   TO REGISTER: Please send an email to Grabiel Chua, Ph.D., LP at?[email or web address]?if you would like an invitation to the group and to learn about using Microsoft Teams.    2022 Meetings  January 11: Chiqui Garcia, PharmD, Pharmacy Resident at [facility], \"Medications and Bariatric Surgery\".  February 8: Open Forum  March 8  April 12  May 10  Dina 14    Connections: Post-Operative Bariatric Surgery Support Group  This is a support group for [facility] bariatric patients (and those external to [facility]) who have had bariatric surgery and are at least 3 months post-surgery.  WHEN: This support group meets the 4th Wednesday of the month from 11:00 AM - 12:00 PM virtually using Microsoft Teams.   FACILITATOR: Led by Certified Bariatric Nurse, Leslie Alonso RN.   TO REGISTER: Please send an email to Leslie at [email or web address] if you would like an invitation to the group and to learn about using Microsoft Teams.    2022 Meetings  January 26  February 23  March 23  April 27  May 25  Dina 22    [facility] Healthy Lifestyle Virtual Support Group    Healthy Lifestyle Virtual Support Group?  This is 60 minutes of small group guided discussion, support and resources. All are welcome who want a healthy lifestyle.  WHEN: This group meets monthly on a Friday from 12:30 PM - 1:30 PM virtually using Microsoft Teams.   FACILITATOR: Led by National Board Certified Health and , Leslie Collins [facility].   TO REGISTER: Please send an email to Leslie [email or web address] to receive monthly invites to the group or if you have any questions about having a " "health .  Prior to the meeting, a link with directions on how to join the meeting will be sent to you.    2022 Meetings  January 21: Jaz Salinas MS, RN, CIC, CBN, \"Healthy Habits\"  February 25: Open Forum  March 18: \"Setting Limits and Boundaries\"  April 29: Yovana Simmons RD, \"Meal Planning Made Easy\"  May 20: Open Forum  June: To be determined                "

## 2022-10-05 NOTE — PROGRESS NOTES
"Fatmata Catherine is a 27 year old female who is being evaluated via a billable video visit.      The patient has been notified of following:     \"This video visit will be conducted via a call between you and your physician/provider. We have found that certain health care needs can be provided without the need for an in-person physical exam.  This service lets us provide the care you need with a video conversation.  If a prescription is necessary we can send it directly to your pharmacy.  If lab work is needed we can place an order for that and you can then stop by our lab to have the test done at a later time.    Video visits are billed at different rates depending on your insurance coverage.  Please reach out to your insurance provider with any questions.    If during the course of the call the physician/provider feels a video visit is not appropriate, you will not be charged for this service.\"    Patient has given verbal consent for Video visit? Yes  How would you like to obtain your AVS? MyChart  If you are dropped from the video visit, the video invite should be resent to: Send to e-mail at: [email or web address]  Will anyone else be joining your video visit? No  {If patient encounters technical issues they should call [phone]      Video-Visit Details    Type of service:  Video Visit    Video Start Time: 11:30 AM  Video End Time: 11:43 AM    Originating Location (pt. Location): Home    Distant Location (provider location):  [facility] WEIGHT MANAGEMENT CLINIC [place]     Platform used for Video Visit: Cuipo    During this virtual visit the patient is located in [place], patient verifies this as the location during the entirety of this visit.     New Weight Management Nutrition Consultation    Fatmata Catherine is a 27 year old female presents today for new weight management nutrition consultation.  Patient referred by Cristal Timmons NP on October 5, 2022.    Patient with Co-morbidities of obesity " "including:  Type II DM no  Renal Failure no  Sleep apnea no  Hypertension yes   Dyslipidemia no  Joint pain no  Back pain yes  GERD no     Anthropometrics:  Estimated body mass index is 34.34 kg/m  as calculated from the following:    Height as of 12/16/21: 1.499 m (4' 11\").    Weight as of 6/8/22: 77.1 kg (170 lb).     Current weight: 189 lbs per pt    Medications for Weight Loss:  Saxenda    NUTRITION HISTORY  Food allergies: none  Food intolerances: none  Supplements: Vitamin D   Previous methods of diet modification for weight loss: Watching Portions or Calories, Exercise, Weight Watchers, Atkins-type Diet (Low Carb/High Protein), Medications    Per Cristal Timmons NP: Since young child. Had testing done as a child, thyroid etc. Has always struggled with yoyo weight. When 19 was only able to lose weight with working out 3-4hr a day and watching very restrictive. Was able to lose 60lbs, but was hard to sustain. Has tried other diets with little help. Has been a gradual weight gain over the years.      Highest weight was 215 after pregnancy. Has stayed around 200. Lowest weight of 119lbs.    10/5/22: Pt states she struggles the most with large portions and eating everything on her plate. Also eats very quickly. Eats one large meal throughout the day and the rest is snacking. Only drinks water if it is bottled.     Recently started work again and son is in .     Recent food recall:  Breakfast: cereal with milk  Lunch: soup and sandwich  Dinner: meat and potatoes   Snacks: chips, granola bars, little jonathan cakes, goldfish   Beverages: bottled water  Alcohol: 2-4 times per month  Dining out: at least once a week    Physical Activity:  Goes to the gym 2-3 times per week for about an hour    Nutrition Prescription  Recommended energy/nutrient modification.    Nutrition Diagnosis  Obesity r/t long history of positive energy balance aeb BMI >30.    Nutrition Intervention  Materials/education provided on hypocaloric " "diet for weight loss. Discussed Volumetric eating to help satiety level on fewer calories; portion control and healthy food choices (Plate Method and Volumetrics handouts), 100 calorie snack choices, meal and snack planning and websites, sample meal plans     Patient demonstrates understanding.    Expected Engagement: good    Nutrition Goals  1) 9\" Plate method (1/2 plate non-starchy vegetables/fruit, 1/4 plate lean protein, 1/4 plate whole grain starch - no more than 1/2 cup carb/meal)  2) Consume 60-90 g protein per day  3) Consume at least 48-64 oz of water   4) Exercise 3-4x per week  5) Easy recipes: https://www.iSoftStone/recipes/  Low Calorie Frozen Meal:  Healthy Choice Power Bowls  Lean Cuisine  Smart Ones  Juan Cruz Ayad  [employer] Family Dinners    6) Avoid snacking as able. If snack is needed use lean protein and/or fruit/vegetable. Examples:   - 2 cup popcorn   - 1 cup mixed berries   - 15 almonds, walnuts, cashews   - carrot/celery sticks and 2 tbsp low-fat ranch   - 1 hard boiled egg   - Part-skim mozzarella cheese stick   - Low-fat, low-sugar greek yogurt with 1/2 cup berries   - Med apple or pear   - sliced bell peppers with 1/2 cup salsa   - 1/2 cup roasted chickpeas   - sliced cucumbers with vinegar    100 calorie sweets: Smart Sweets, Fiber One desserts, Fit and Active 100 calorie snack sweets at [employer]; Nabisco 100 calorie pre-portioned cookies, sugar-free pudding, sugar-free jello.    Meal Replacement Shake Options:   *Protein Shake Criteria: no more than 210 Calories, at least 20 grams of protein, and less than 10 grams of sugar   [facility] smoothie (160 Calories, 20 g protein)   Premier Protein (160 Calories, 30 g protein)  Slim Fast Advanced Nutrition (180 Calories, 20 g protein)  Muscle Milk, lactose-free, 17 oz bottle (210 Calories, 30 g protein)  Integrated Supplements, no artificial sugars (110 Calories, 20 g protein)  Genepro, unflavored protein powder (60 Calories, 30 g " protein)  Boost/Ensure Max (160 calories, 30 gm protein)   [facility] Core Power (170 calories, 26 gm protein)  Aldi's Elevation Protein Powder (180 calories, 30 gm protein)     Meal Replacement Bar Options:  [facility] Protein Shake (160 Calories, 15 g protein)  Quest Protein Bars (190 Calories, 20 g protein)  Built Bar (170 Calories, 15-20 g protein)  One Protein Bar (210 calories, 20 g protein)  [place] Signature Protein Bar ([employer]co) (190 Calories, 21 g protein)  Pure Protein Bars (180 Calories, 21 g protein)  Think! Smores Bar      The Plate Method  [email or web address]    Protein Sources   [email or web address]     Carbohydrates  [email or web address]     Mindful Eating  [email or web address]     Summary of Volumetrics Eating Plan  [email or web address]     Follow-Up:  1 month, PRN    Time spent with patient: 13 minutes.  URMILA SCHUSTER RD, LD

## 2022-10-05 NOTE — TELEPHONE ENCOUNTER
Central Prior Authorization Team   Phone: [phone]    PA Initiation    Medication: Saxenda  Insurance Company: [employer] - Phone [phone] Fax [phone]  Pharmacy Filling the Rx: [facility] #[id number] - [place], [place] - [address] AT [address]  Filling Pharmacy Phone: [phone]  Filling Pharmacy Fax: [phone]  Start Date: 10/5/2022

## 2022-10-05 NOTE — LETTER
"10/5/2022       RE: Fatmata Catherine  [address] [place] [address]     Dear Colleague,    Thank you for referring your patient, Fatmata Catherine, to the [facility] MANAGEMENT [facility] [place] [facility]. Please see a copy of my visit note below.    Fatmata Catherine is a 27 year old female who is being evaluated via a billable video visit.      The patient has been notified of following:     \"This video visit will be conducted via a call between you and your physician/provider. We have found that certain health care needs can be provided without the need for an in-person physical exam.  This service lets us provide the care you need with a video conversation.  If a prescription is necessary we can send it directly to your pharmacy.  If lab work is needed we can place an order for that and you can then stop by our lab to have the test done at a later time.    Video visits are billed at different rates depending on your insurance coverage.  Please reach out to your insurance provider with any questions.    If during the course of the call the physician/provider feels a video visit is not appropriate, you will not be charged for this service.\"    Patient has given verbal consent for Video visit? Yes  How would you like to obtain your AVS? MyChart  If you are dropped from the video visit, the video invite should be resent to: Send to e-mail at: [email or web address]  Will anyone else be joining your video visit? No  {If patient encounters technical issues they should call [phone]      Video-Visit Details    Type of service:  Video Visit    Video Start Time: 11:30 AM  Video End Time: 11:43 AM    Originating Location (pt. Location): Home    Distant Location (provider location):  [facility] WEIGHT MANAGEMENT [facility]     Platform used for Video Visit: OneRoomRate.com    During this virtual visit the patient is located in [place], patient " "verifies this as the location during the entirety of this visit.     New Weight Management Nutrition Consultation    Fatmata Catherine is a 27 year old female presents today for new weight management nutrition consultation.  Patient referred by Cristal Timmons NP on October 5, 2022.    Patient with Co-morbidities of obesity including:  Type II DM no  Renal Failure no  Sleep apnea no  Hypertension yes   Dyslipidemia no  Joint pain no  Back pain yes  GERD no     Anthropometrics:  Estimated body mass index is 34.34 kg/m  as calculated from the following:    Height as of 12/16/21: 1.499 m (4' 11\").    Weight as of 6/8/22: 77.1 kg (170 lb).     Current weight: 189 lbs per pt    Medications for Weight Loss:  Saxenda    NUTRITION HISTORY  Food allergies: none  Food intolerances: none  Supplements: Vitamin D   Previous methods of diet modification for weight loss: Watching Portions or Calories, Exercise, Weight Watchers, Atkins-type Diet (Low Carb/High Protein), Medications    Per Cristal Timmons NP: Since young child. Had testing done as a child, thyroid etc. Has always struggled with yoyo weight. When 19 was only able to lose weight with working out 3-4hr a day and watching very restrictive. Was able to lose 60lbs, but was hard to sustain. Has tried other diets with little help. Has been a gradual weight gain over the years.      Highest weight was 215 after pregnancy. Has stayed around 200. Lowest weight of 119lbs.    10/5/22: Pt states she struggles the most with large portions and eating everything on her plate. Also eats very quickly. Eats one large meal throughout the day and the rest is snacking. Only drinks water if it is bottled.     Recently started work again and son is in .     Recent food recall:  Breakfast: cereal with milk  Lunch: soup and sandwich  Dinner: meat and potatoes   Snacks: chips, granola bars, little jonathan cakes, goldfish   Beverages: bottled water  Alcohol: 2-4 times per month  Dining out: at " "least once a week    Physical Activity:  Goes to the gym 2-3 times per week for about an hour    Nutrition Prescription  Recommended energy/nutrient modification.    Nutrition Diagnosis  Obesity r/t long history of positive energy balance aeb BMI >30.    Nutrition Intervention  Materials/education provided on hypocaloric diet for weight loss. Discussed Volumetric eating to help satiety level on fewer calories; portion control and healthy food choices (Plate Method and Volumetrics handouts), 100 calorie snack choices, meal and snack planning and websites, sample meal plans     Patient demonstrates understanding.    Expected Engagement: good    Nutrition Goals  1) 9\" Plate method (1/2 plate non-starchy vegetables/fruit, 1/4 plate lean protein, 1/4 plate whole grain starch - no more than 1/2 cup carb/meal)  2) Consume 60-90 g protein per day  3) Consume at least 48-64 oz of water   4) Exercise 3-4x per week  5) Easy recipes: https://www.Penana/recipes/  Low Calorie Frozen Meal:  Healthy Choice Power Bowls  Lean Cuisine  Smart Ones  Juan Arroyo [facility]  [employer] Dinners    6) Avoid snacking as able. If snack is needed use lean protein and/or fruit/vegetable. Examples:   - 2 cup popcorn   - 1 cup mixed berries   - 15 almonds, walnuts, cashews   - carrot/celery sticks and 2 tbsp low-fat ranch   - 1 hard boiled egg   - Part-skim mozzarella cheese stick   - Low-fat, low-sugar greek yogurt with 1/2 cup berries   - Med apple or pear   - sliced bell peppers with 1/2 cup salsa   - 1/2 cup roasted chickpeas   - sliced cucumbers with vinegar    100 calorie sweets: Smart Sweets, Fiber One desserts, Fit and Active 100 calorie snack sweets at [employer]; Nabisco 100 calorie pre-portioned cookies, sugar-free pudding, sugar-free jello.    Meal Replacement Shake Options:   *Protein Shake Criteria: no more than 210 Calories, at least 20 grams of protein, and less than 10 grams of sugar   [facility] smoothie (160 Calories, 20 " g protein)   Premier Protein (160 Calories, 30 g protein)  Slim Fast Advanced Nutrition (180 Calories, 20 g protein)  Muscle Milk, lactose-free, 17 oz bottle (210 Calories, 30 g protein)  Integrated Supplements, no artificial sugars (110 Calories, 20 g protein)  Genepro, unflavored protein powder (60 Calories, 30 g protein)  Boost/Ensure Max (160 calories, 30 gm protein)   [employer] Core Power (170 calories, 26 gm protein)  Aldi's Elevation Protein Powder (180 calories, 30 gm protein)     Meal Replacement Bar Options:  [facility] Protein Shake (160 Calories, 15 g protein)  Quest Protein Bars (190 Calories, 20 g protein)  Built Bar (170 Calories, 15-20 g protein)  One Protein Bar (210 calories, 20 g protein)  [place] Signature Protein Bar ([employer]co) (190 Calories, 21 g protein)  Pure Protein Bars (180 Calories, 21 g protein)  Think! Smores Bar      The Plate Method  [email or web address]    Protein Sources   [email or web address]     Carbohydrates  [email or web address]     Mindful Eating  [email or web address]     Summary of Volumetrics Eating Plan  [email or web address]     Follow-Up:  1 month, PRN    Time spent with patient: 13 minutes.  URMILA SCHUSTER RD, LD

## 2022-10-07 ENCOUNTER — TELEPHONE (OUTPATIENT)
Dept: ENDOCRINOLOGY | Facility: CLINIC | Age: 27
End: 2022-10-07

## 2022-10-07 NOTE — TELEPHONE ENCOUNTER
MTM referral from: [facility] visit (referral by provider)    MTM referral outreach attempt #2 on October 7, 2022 at 11:23 AM      Outcome: Patient not reachable after several attempts, will route to MTM Pharmacist/Provider as an FYI.  MT scheduling number is [phone].  Thank you for the referral.    Jarad Davis, MTM coordinator

## 2022-10-07 NOTE — TELEPHONE ENCOUNTER
Called patient to schedule next follow up in the [facility] Management Clinic with Cristal Timmons per provider last visit on 10/05/22 checkout comment dispositions. No answered. Left message with clinic number for appointment scheduling.     FOLLOW-UP:    Cristal Timmons NP in 3 months (around 01/05/2023)    Mara Freire, Clinic , 10/07/22 at 10:23 AM

## 2022-11-08 NOTE — PROGRESS NOTES
"Fatmata Catherine is a 27 year old female who is being evaluated via a billable video visit.      The patient has been notified of following:     \"This video visit will be conducted via a call between you and your physician/provider. We have found that certain health care needs can be provided without the need for an in-person physical exam.  This service lets us provide the care you need with a video conversation.  If a prescription is necessary we can send it directly to your pharmacy.  If lab work is needed we can place an order for that and you can then stop by our lab to have the test done at a later time.    Video visits are billed at different rates depending on your insurance coverage.  Please reach out to your insurance provider with any questions.    If during the course of the call the physician/provider feels a video visit is not appropriate, you will not be charged for this service.\"    Patient has given verbal consent for Video visit? Yes  How would you like to obtain your AVS? MyChart  If you are dropped from the video visit, the video invite should be resent to: Send to e-mail at: [email or web address]  Will anyone else be joining your video visit? No  {If patient encounters technical issues they should call [phone]      Video-Visit Details    Type of service:  Video Visit    Video Start Time: 11:30 AM  Video End Time: 11:39 AM    Originating Location (pt. Location): Home    Distant Location (provider location):  [facility]NAG[facility] [place]     Platform used for Video Visit: Buyoo    During this virtual visit the patient is located in [place], patient verifies this as the location during the entirety of this visit.     Return Weight Management Nutrition Consultation    Fatmtaa Catherine is a 27 year old female presents today for a return weight management nutrition consultation.  Patient referred by Cristal Timmons NP on October 5, 2022.    Patient with Co-morbidities of " "obesity including:  Type II DM no  Renal Failure no  Sleep apnea no  Hypertension yes   Dyslipidemia no  Joint pain no  Back pain yes  GERD no     Anthropometrics:  Estimated body mass index is 38.17 kg/m  as calculated from the following:    Height as of 10/5/22: 1.499 m (4' 11\").    Weight as of 10/5/22: 85.7 kg (189 lb).     Current weight: 189 lbs per pt (has not weighed recently)    Medications for Weight Loss:  Saxenda    NUTRITION HISTORY  Food allergies: none  Food intolerances: none  Supplements: Vitamin D   Previous methods of diet modification for weight loss: Watching Portions or Calories, Exercise, Weight Watchers, Atkins-type Diet (Low Carb/High Protein), Medications    Per Cristal Timmons NP: Since young child. Had testing done as a child, thyroid etc. Has always struggled with yoyo weight. When 19 was only able to lose weight with working out 3-4hr a day and watching very restrictive. Was able to lose 60lbs, but was hard to sustain. Has tried other diets with little help. Has been a gradual weight gain over the years.      Highest weight was 215 after pregnancy. Has stayed around 200. Lowest weight of 119lbs.    10/5/22: Pt states she struggles the most with large portions and eating everything on her plate. Also eats very quickly. Eats one large meal throughout the day and the rest is snacking. Only drinks water if it is bottled.     Recently started work again and son is in .     11/9/22: Pt reports she has been doing well but is struggling with new sleep/meal patterns since starting her new job. Often works nights and doesn't get home until 12:30 AM. Struggling with finding a regular sleep schedule and times to eat meals. Often finds herself having a small snack instead of a true meal.      Recent food recall:  Breakfast: cereal with milk  Lunch: soup and sandwich  Dinner: meat and potatoes   Snacks: chips, granola bars, little jonathan cakes, goldfish   Beverages: bottled water  Alcohol: 2-4 " "times per month  Dining out: at least once a week    Physical Activity:  Goes to the gym 2-3 times per week for about an hour    Progress on Previous Goals  1) 9\" Plate method (1/2 plate non-starchy vegetables/fruit, 1/4 plate lean protein, 1/4 plate whole grain starch - no more than 1/2 cup carb/meal)  2) Consume 60-90 g protein per day  3) Consume at least 48-64 oz of water   4) Exercise 3-4x per week  5) Easy recipes: https://www.[employer]/recipes/  Low Calorie Frozen Meal:  Healthy Choice Siteminisners    6) Avoid snacking as able. If snack is needed use lean protein and/or fruit/vegetable.     Nutrition Prescription  Recommended energy/nutrient modification.    Nutrition Diagnosis  Obesity r/t long history of positive energy balance aeb BMI >30.    Nutrition Intervention  Materials/education provided on hypocaloric diet for weight loss. Discussed Volumetric eating to help satiety level on fewer calories; portion control and healthy food choices (Plate Method and Volumetrics handouts), 100 calorie snack choices, meal and snack planning and websites, sample meal plans     Patient demonstrates understanding.    Expected Engagement: good    Nutrition Goals  1) 9\" Plate method (1/2 plate non-starchy vegetables/fruit, 1/4 plate lean protein, 1/4 plate whole grain starch - no more than 1/2 cup carb/meal)  2) Consume 60-90 g protein per day  3) Consume at least 48-64 oz of water   4) Exercise 3-4x per week  5) Easy recipes: https://wwwSMA Informatics/recipes/  Low Calorie Frozen Meal:  Healthy Choice aXess america Dinners    6) Avoid snacking as able. If snack is needed use lean protein and/or fruit/vegetable.      The Plate Method  [email or web address]    Protein Sources   http://[employer] Level [employer]/[email or web address]     Carbohydrates  [email or web address]     Mindful " Eating  http://[email or web address] Services/[email or web address]     Summary of Volumetrics Eating Plan  [email or web address]     Follow-Up:  1 month, PRN    Time spent with patient:  9 minutes.  URMILA SCHUSTER RD, LD

## 2022-11-09 ENCOUNTER — VIRTUAL VISIT (OUTPATIENT)
Dept: ENDOCRINOLOGY | Facility: CLINIC | Age: 27
End: 2022-11-09
Payer: COMMERCIAL

## 2022-11-09 DIAGNOSIS — E66.812 CLASS 2 SEVERE OBESITY WITH SERIOUS COMORBIDITY AND BODY MASS INDEX (BMI) OF 38.0 TO 38.9 IN ADULT, UNSPECIFIED OBESITY TYPE (H): ICD-10-CM

## 2022-11-09 DIAGNOSIS — Z71.3 NUTRITIONAL COUNSELING: Primary | ICD-10-CM

## 2022-11-09 DIAGNOSIS — E66.01 CLASS 2 SEVERE OBESITY WITH SERIOUS COMORBIDITY AND BODY MASS INDEX (BMI) OF 38.0 TO 38.9 IN ADULT, UNSPECIFIED OBESITY TYPE (H): ICD-10-CM

## 2022-11-09 PROCEDURE — 97803 MED NUTRITION INDIV SUBSEQ: CPT | Mod: GT | Performed by: DIETITIAN, REGISTERED

## 2022-11-09 NOTE — LETTER
"11/9/2022       RE: Fatmata Catherine  [address] [place] [address]     Dear Colleague,    Thank you for referring your patient, Fatmata Catherine, to the [facility] WE[facility]NAGEMENT [facility]IN[facility] [place] [facility]. Please see a copy of my visit note below.    Fatmata Catherine is a 27 year old female who is being evaluated via a billable video visit.      The patient has been notified of following:     \"This video visit will be conducted via a call between you and your physician/provider. We have found that certain health care needs can be provided without the need for an in-person physical exam.  This service lets us provide the care you need with a video conversation.  If a prescription is necessary we can send it directly to your pharmacy.  If lab work is needed we can place an order for that and you can then stop by our lab to have the test done at a later time.    Video visits are billed at different rates depending on your insurance coverage.  Please reach out to your insurance provider with any questions.    If during the course of the call the physician/provider feels a video visit is not appropriate, you will not be charged for this service.\"    Patient has given verbal consent for Video visit? Yes  How would you like to obtain your AVS? MyChart  If you are dropped from the video visit, the video invite should be resent to: Send to e-mail at: [email or web address]  Will anyone else be joining your video visit? No  {If patient encounters technical issues they should call [phone]      Video-Visit Details    Type of service:  Video Visit    Video Start Time: 11:30 AM  Video End Time: 11:39 AM    Originating Location (pt. Location): Home    Distant Location (provider location):  [facility] WEIGHT MANAGEMENT [facility]     Platform used for Video Visit: Limonetik    During this virtual visit the patient is located in [place], patient " "verifies this as the location during the entirety of this visit.     Return Weight Management Nutrition Consultation    Fatmata Catherine is a 27 year old female presents today for a return weight management nutrition consultation.  Patient referred by Cristal Timmons NP on October 5, 2022.    Patient with Co-morbidities of obesity including:  Type II DM no  Renal Failure no  Sleep apnea no  Hypertension yes   Dyslipidemia no  Joint pain no  Back pain yes  GERD no     Anthropometrics:  Estimated body mass index is 38.17 kg/m  as calculated from the following:    Height as of 10/5/22: 1.499 m (4' 11\").    Weight as of 10/5/22: 85.7 kg (189 lb).     Current weight: 189 lbs per pt (has not weighed recently)    Medications for Weight Loss:  Saxenda    NUTRITION HISTORY  Food allergies: none  Food intolerances: none  Supplements: Vitamin D   Previous methods of diet modification for weight loss: Watching Portions or Calories, Exercise, Weight Watchers, Atkins-type Diet (Low Carb/High Protein), Medications    Per Cristal Timmons NP: Since young child. Had testing done as a child, thyroid etc. Has always struggled with yoyo weight. When 19 was only able to lose weight with working out 3-4hr a day and watching very restrictive. Was able to lose 60lbs, but was hard to sustain. Has tried other diets with little help. Has been a gradual weight gain over the years.      Highest weight was 215 after pregnancy. Has stayed around 200. Lowest weight of 119lbs.    10/5/22: Pt states she struggles the most with large portions and eating everything on her plate. Also eats very quickly. Eats one large meal throughout the day and the rest is snacking. Only drinks water if it is bottled.     Recently started work again and son is in .     11/9/22: Pt reports she has been doing well but is struggling with new sleep/meal patterns since starting her new job. Often works nights and doesn't get home until 12:30 AM. Struggling with finding a " "regular sleep schedule and times to eat meals. Often finds herself having a small snack instead of a true meal.      Recent food recall:  Breakfast: cereal with milk  Lunch: soup and sandwich  Dinner: meat and potatoes   Snacks: chips, granola bars, little jonathan cakes, goldfish   Beverages: bottled water  Alcohol: 2-4 times per month  Dining out: at least once a week    Physical Activity:  Goes to the gym 2-3 times per week for about an hour    Progress on Previous Goals  1) 9\" Plate method (1/2 plate non-starchy vegetables/fruit, 1/4 plate lean protein, 1/4 plate whole grain starch - no more than 1/2 cup carb/meal)  2) Consume 60-90 g protein per day  3) Consume at least 48-64 oz of water   4) Exercise 3-4x per week  5) Easy recipes: https://www.[employer]a [employer]/recipes/  Low Calorie Frozen Meal:  Healthy Choice Power Bowls  Lean Bionic Panda Gamesisine  Smart Ones  Juanmayra Bobbyn Ayad  Smock Family Dinners    6) Avoid snacking as able. If snack is needed use lean protein and/or fruit/vegetable.     Nutrition Prescription  Recommended energy/nutrient modification.    Nutrition Diagnosis  Obesity r/t long history of positive energy balance aeb BMI >30.    Nutrition Intervention  Materials/education provided on hypocaloric diet for weight loss. Discussed Volumetric eating to help satiety level on fewer calories; portion control and healthy food choices (Plate Method and Volumetrics handouts), 100 calorie snack choices, meal and snack planning and websites, sample meal plans     Patient demonstrates understanding.    Expected Engagement: good    Nutrition Goals  1) 9\" Plate method (1/2 plate non-starchy vegetables/fruit, 1/4 plate lean protein, 1/4 plate whole grain starch - no more than 1/2 cup carb/meal)  2) Consume 60-90 g protein per day  3) Consume at least 48-64 oz of water   4) Exercise 3-4x per week  5) Easy recipes: https://www.S[employer]a [employer]ochem/recipes/  Low Calorie Frozen Meal:  Healthy Choice Power Bowls  Lean Cuisine  Smart " Catherine Arroyo Ayad  [place] Family Dinners    6) Avoid snacking as able. If snack is needed use lean protein and/or fruit/vegetable.      The Plate Method  [email or web address]    Protein Sources   [email or web address]     Carbohydrates  [email or web address]     Mindful Eating  http://[email or web address]     Summary of Volumetrics Eating Plan  [email or web address]     Follow-Up:  1 month, PRN    Time spent with patient:  9 minutes.  URMILA SCHUSTER RD, LD          Again, thank you for allowing me to participate in the care of your patient.      Sincerely,    URMILA SCHUSTER RD

## 2022-11-09 NOTE — PATIENT INSTRUCTIONS
"Vishal Avilez!    Follow-up with RD in 1 month    Thank you,    Renetta Schaefer, RD, LD  If you would like to schedule or reschedule an appointment with the RD, please call [phone]    Nutrition Goals  1) 9\" Plate method (1/2 plate non-starchy vegetables/fruit, 1/4 plate lean protein, 1/4 plate whole grain starch - no more than 1/2 cup carb/meal)  2) Consume 60-90 g protein per day  3) Consume at least 48-64 oz of water   4) Exercise 3-4x per week  5) Easy recipes: https://www.Brandpotion/recipes/  Low Calorie Frozen Meal:  Healthy Choice Power Bowls  Lean Cuisine  Smart Ones  Juan Lion  GeoVario Family Dinners    6) Avoid snacking as able. If snack is needed use lean protein and/or fruit/vegetable.    Interested in working with a health ? Health coaches work with you to improve your overall health and wellbeing. They look at the whole person, and may involve discussion of different areas of life, including, but not limited to the four pillars of health (sleep, exercise, nutrition, and stress management). Discuss with your care team if you would like to start working a health .    Health Coaching-3 Pack:    $99 for three health coaching visits    Visits may be done in person or via phone    Coaching is a partnership between the  and the client; Coaches do not prescribe or diagnose    Coaching helps inspire the client to reach his/her personal goals    COMPREHENSIVE WEIGHT MANAGEMENT PROGRAM  VIRTUAL SUPPORT GROUPS    For Support Group Information:      We offer support groups for patients who are working on weight loss and considering, preparing for or have had weight loss surgery.   There is no cost for this opportunity.  You are invited to attend the?Virtual Support Groups?provided by any of the following locations:    [facility] via [employer] Teams with Nicolle Monroe RN  2.   [place] via [employer] with Grabiel Chua, PhD, LP  3.   [place] via [employer] with Leslie Alonso RN  4. " "  [facility] via Microsoft Teams with Leslie Collins [facility]    The following Support Group information can also be found on our website:  [email or web address]      [facility] Weight Loss Surgery Support Group    [facility] Weight Loss Surgery Support Group  The support group is a patient-lead forum that meets monthly to share experiences, encouragement and education. It is open to those who have had weight loss surgery, are scheduled for surgery, and those who are considering surgery.   WHEN: This group meets on the 3rd Wednesday of each month from 5:00PM - 6:00PM virtually using Microsoft Teams.   FACILITATOR: Led by Nicolle Monroe RD, LD, RN, the program's Clinical Coordinator.   TO REGISTER: Please contact the clinic via Intalio or call the nurse line directly at [phone] to inform our staff that you would like an invite sent to you and to let us know the email you would like the invite sent to. Prior to the meeting, a link with directions on how to join the meeting will be sent to you.    2022 Meetings  January 19: \"Let's Talk\" a time for the group to share.  February 16: \"Let's Talk\" a time for the group to share.  March 16: Guest Speakers: Psychologists, Cat Cadena, PhD,LP and Angelica Pena, PsyD,  April 20: Guest Speaker: Health , Mirela Watson, CH,CHES, CPT  May 18: Guest Speaker: DietitianPapi RD, LP  Dina 15: \"Let's Talk\" a time for the group to share.  July 20: \"Let's Talk\" a time for the group to share.  August 17: TBA  September 21: TBA  October 19: Guest Speaker: Dr Buster Martini MD Pulmonologist and Sleep Medicine Physician, \"Getting a Good Night's Sleep\".  November 16: TBA  December 21: TBA    [facility] and [facility] Support Groups    Connections: Bariatric Care Support Group?  This is open to all [facility] (and those external to this program) " "pre- and post- operative bariatric surgery patients as well as their support system.   WHEN: This group meets the 2nd Tuesday of each month from 6:30 PM - 8:00 PM virtually using Microsoft Teams.   FACILITATOR: Led by Grabiel Chua, Ph.D who is a Licensed Psychologist with the [facility] Comprehensive Weight Management Program.   TO REGISTER: Please send an email to Grabiel Chua, Ph.D., LP at?[email or web address]?if you would like an invitation to the group and to learn about using Microsoft Teams.    2022 Meetings January 11: Chiqui Garcia, PharmD, Pharmacy Resident at [facility], \"Medications and Bariatric Surgery\".  February 8: Open Forum  March 8  April 12  May 10  Dina 14    Connections: Post-Operative Bariatric Surgery Support Group  This is a support group for [facility] bariatric patients (and those external to [facility]) who have had bariatric surgery and are at least 3 months post-surgery.  WHEN: This support group meets the 4th Wednesday of the month from 11:00 AM - 12:00 PM virtually using Microsoft Teams.   FACILITATOR: Led by Certified Bariatric Nurse, Leslie Alonso RN.   TO REGISTER: Please send an email to Leslie at [email or web address] if you would like an invitation to the group and to learn about using Microsoft Teams.    2022 Meetings  January 26  February 23  March 23  April 27  May 25  Dina 22    [facility] Healthy Lifestyle Virtual Support Group    Healthy Lifestyle Virtual Support Group?  This is 60 minutes of small group guided discussion, support and resources. All are welcome who want a healthy lifestyle.  WHEN: This group meets monthly on a Friday from 12:30 PM - 1:30 PM virtually using Microsoft Teams.   FACILITATOR: Led by National Board Certified Health and , Leslie Collins [facility].   TO REGISTER: Please send an email to Leslie [email or web address] to receive monthly invites to the group or " "if you have any questions about having a health .  Prior to the meeting, a link with directions on how to join the meeting will be sent to you.    2022 Meetings  January 21: Jaz Salinas MS, RN, CIC, CBN, \"Healthy Habits\"  February 25: Open Forum  March 18: \"Setting Limits and Boundaries\"  April 29: Yovana Simmons RD, \"Meal Planning Made Easy\"  May 20: Open Forum  June: To be determined                "

## 2022-11-09 NOTE — LETTER
Date:November 10, 2022      Provider requested that no letter be sent. Do not send.       [facility]

## 2022-12-14 ENCOUNTER — TELEPHONE (OUTPATIENT)
Dept: ENDOCRINOLOGY | Facility: CLINIC | Age: 27
End: 2022-12-14

## 2022-12-14 NOTE — TELEPHONE ENCOUNTER
Patient no show for today's phone visit. Called pt 2x, no answer. LVM. Sent Granicushart message, no response. Provided pt with number to reschedule.     Renetta Schaefer RD, LD

## 2023-01-27 ENCOUNTER — TELEPHONE (OUTPATIENT)
Dept: ENDOCRINOLOGY | Facility: CLINIC | Age: 28
End: 2023-01-27
Payer: COMMERCIAL

## 2023-01-27 NOTE — TELEPHONE ENCOUNTER
Patient feels like Elizabeth is not working for her. She would like to try Wegovy.    [phone] okay to leave VM

## 2023-02-10 ENCOUNTER — APPOINTMENT (OUTPATIENT)
Dept: URGENT CARE | Facility: CLINIC | Age: 28
End: 2023-02-10
Payer: COMMERCIAL

## 2023-03-01 ENCOUNTER — VIRTUAL VISIT (OUTPATIENT)
Dept: ENDOCRINOLOGY | Facility: CLINIC | Age: 28
End: 2023-03-01
Payer: COMMERCIAL

## 2023-03-01 ENCOUNTER — TELEPHONE (OUTPATIENT)
Dept: ENDOCRINOLOGY | Facility: CLINIC | Age: 28
End: 2023-03-01

## 2023-03-01 VITALS — HEIGHT: 59 IN | WEIGHT: 175 LBS | BODY MASS INDEX: 35.28 KG/M2

## 2023-03-01 DIAGNOSIS — E66.01 CLASS 2 SEVERE OBESITY WITH SERIOUS COMORBIDITY AND BODY MASS INDEX (BMI) OF 38.0 TO 38.9 IN ADULT, UNSPECIFIED OBESITY TYPE (H): Primary | ICD-10-CM

## 2023-03-01 DIAGNOSIS — E66.812 CLASS 2 SEVERE OBESITY WITH SERIOUS COMORBIDITY AND BODY MASS INDEX (BMI) OF 38.0 TO 38.9 IN ADULT, UNSPECIFIED OBESITY TYPE (H): Primary | ICD-10-CM

## 2023-03-01 PROCEDURE — 99213 OFFICE O/P EST LOW 20 MIN: CPT | Mod: VID

## 2023-03-01 RX ORDER — SEMAGLUTIDE 1.7 MG/.75ML
1.7 INJECTION, SOLUTION SUBCUTANEOUS WEEKLY
Qty: 3 ML | Refills: 2 | Status: SHIPPED | OUTPATIENT
Start: 2023-03-01 | End: 2023-11-06

## 2023-03-01 ASSESSMENT — PAIN SCALES - GENERAL: PAINLEVEL: NO PAIN (0)

## 2023-03-01 NOTE — NURSING NOTE
"(   Chief Complaint   Patient presents with     RECHECK     Return MWM    )    ( Weight: 79.4 kg (175 lb) (Pt reported) )  ( Height: 149.9 cm (4' 11\") )  ( BMI (Calculated): 35.35 )  (   )  (   )  (   )  (   )  (   )  (   )    (   )  (   )  (   )  (   )  (   )  (   )  (   )    (   Patient Active Problem List   Diagnosis      delivery delivered     Encounter for triage in pregnant patient     Class 2 severe obesity with serious comorbidity and body mass index (BMI) of 38.0 to 38.9 in adult, unspecified obesity type (H)     Depression with anxiety     Hidradenitis suppurativa     Borderline personality disorder (H)     ADHD (attention deficit hyperactivity disorder)    )  (   Current Outpatient Medications   Medication Sig Dispense Refill     buPROPion (WELLBUTRIN SR) 150 MG 12 hr tablet Take 150 mg by mouth       citalopram (CELEXA) 40 MG tablet Take 40 mg by mouth daily       clindamycin (CLINDAMAX) 1 % external gel APPLY TOPICALLY TO THE AFFECTED AREA TWICE DAILY       insulin pen needle (31G X 5 MM) 31G X 5 MM miscellaneous Use Saxenda pen needles daily or as directed. 100 each 3     liraglutide - Weight Management (SAXENDA) 18 MG/3ML pen Week 1: 0.6 mg subcutaneous daily, Week 2: 1.2 mg daily, Week 3: 1.8 mg daily, Week 4: 2.4 mg daily, Week 5 & on: 3 mg daily 15 mL 2     NYSTOP 420402 UNIT/GM powder APPLY A THIN LAYER TO INCISION SITE TWICE DAILY AS NEEDED       VENTOLIN  (90 Base) MCG/ACT inhaler INHALE 1 PUFF BY MOUTH EVERY 4 HOURS AS NEEDED       VITAMIN D3 50 MCG (2000 UT) tablet Take 1 tablet by mouth daily      )  ( Diabetes Eval:    )    ( Pain Eval:  No Pain (0) )    ( Wound Eval:       )    (   History   Smoking Status     Never   Smokeless Tobacco     Never    )    ( Signed By:  Varun Hutchinson, EMT; 2023; 7:21 AM )    "

## 2023-03-01 NOTE — LETTER
3/1/2023       RE: Fatmata Catherine  [address] [place] [address]     Dear Colleague,    Thank you for referring your patient, Fatmata Catherine, to the [facility] WEIGHT MANAGEMENT [facility]. Please see a copy of my visit note below.    Fatmata Catherine is a 27 year old who is being evaluated via a billable video visit.      How would you like to obtain your AVS? MyChart  If the video visit is dropped, the invitation should be resent by: Text to cell phone: [phone]  Will anyone else be joining your video visit? No  If patient encounters technical issues they should call [phone]    During this virtual visit the patient is located in [place], patient verifies this as the location during the entirety of this visit.     Video-Visit Details  Video Start Time: 7:32AM    Type of service:  Video Visit    Video End Time:7:43AM    Originating Location (pt. Location): Home    Distant Location (provider location):  Off-site    Platform used for Video Visit: [facility] Weight Management Note     Fatmata Catherine  MRN:  [id number]  :  1995  VAISHALI:  3/1/2023    Dear Physician No Ref-Primary,    I had the pleasure of seeing your patient Fatmata Catherine. She is a 27 year old female who I am continuing to see for treatment of obesity related to:       10/5/2022   I have the following health issues associated with obesity: High Blood Pressure, Stress Incontinence   I have the following symptoms associated with obesity: Depression, Back Pain, Fatigue, Irregular Menstral Cycle       Assessment & Plan   Problem List Items Addressed This Visit        Digestive    Class 2 severe obesity with serious comorbidity and body mass index (BMI) of 38.0 to 38.9 in adult, unspecified obesity type (H) - Primary    Relevant Medications    Semaglutide-Weight Management (WEGOVY) 1.7 MG/0.75ML pen      1. Labs to be collected   2.  Continue Saxenda 3.0mg once daily until Wegovy is available  3. Transition to Wegovy 1.7mg once weekly. Stop Saxenda the day prior to starting Wegovy once weekly.    4. Follow up with Edyta Manzano in 6 weeks     INTERVAL HISTORY:  First seen for New MWM - 10/5/2023  Started Saxenda    Has lost 15lbs since last visit. Has not weighed herself recently. But clothes are not currently feeling as loose. She is concerned she is regaining weight.     Anti-obesity medications:     Current:   Saxenda 3.0mg - Has been at this dose for around 2 months. Taking it daily. at first was really helpful. Is hard to do an injection every day. No side effects. Is feeling hungry all the time again.       Recent diet changes: Is eating a smaller lunch - usually chips + diet coke. Dinner is whatever is available. Increase snacking throughout the day.     Recent exercise/activity changes: Very limited currently during to work schedule. Will not come home till 10pm. Has been harder with the weather.     Recent stressors:  Increase stress with weight regain. Feels that her self confidence has really not been good because of it.     Recent sleep changes: Stable. Has been able to sleep more since son is sleeping through the night         CURRENT WEIGHT:   175 lbs 0 oz    Initial Weight (lbs): 189 lbs  Last Visits Weight: 85.7 kg (189 lb)  Cumulative weight loss (lbs): 14  Weight Loss Percentage: 7.41%    Changes and Difficulties 3/1/2023   I have made the following changes to my diet since my last visit: none   With regards to my diet, I am still struggling with: hard to take medication, hungry when sitting at desk. hard to avoid snacks   I have made the following changes to my activity/exercise since my last visit: trying to go to gym more in apartment   With regards to my activity/exercise, I am still struggling with: busy with kids and work         MEDICATIONS:   Current Outpatient Medications   Medication Sig Dispense Refill     buPROPion  "(WELLBUTRIN SR) 150 MG 12 hr tablet Take 150 mg by mouth       citalopram (CELEXA) 40 MG tablet Take 40 mg by mouth daily       clindamycin (CLINDAMAX) 1 % external gel APPLY TOPICALLY TO THE AFFECTED AREA TWICE DAILY       insulin pen needle (31G X 5 MM) 31G X 5 MM miscellaneous Use Saxenda pen needles daily or as directed. 100 each 3     liraglutide - Weight Management (SAXENDA) 18 MG/3ML pen Week 1: 0.6 mg subcutaneous daily, Week 2: 1.2 mg daily, Week 3: 1.8 mg daily, Week 4: 2.4 mg daily, Week 5 & on: 3 mg daily 15 mL 2     NYSTOP 489253 UNIT/GM powder APPLY A THIN LAYER TO INCISION SITE TWICE DAILY AS NEEDED       Semaglutide-Weight Management (WEGOVY) 1.7 MG/0.75ML pen Inject 1.7 mg Subcutaneous once a week 3 mL 2     VENTOLIN  (90 Base) MCG/ACT inhaler INHALE 1 PUFF BY MOUTH EVERY 4 HOURS AS NEEDED       VITAMIN D3 50 MCG (2000 UT) tablet Take 1 tablet by mouth daily         Weight Loss Medication History Reviewed With Patient 3/1/2023   Which weight loss medications are you currently taking on a regular basis?  Ozempic, Bupropion   Are you having any side effects from the weight loss medication that we have prescribed you? No             Objective     Ht 1.499 m (4' 11\")   Wt 79.4 kg (175 lb)   BMI 35.35 kg/m      Vitals - Patient Reported  Pain Score: No Pain (0)    PHYSICAL EXAM:    GENERAL: Healthy, alert and no distress  EYES: Eyes grossly normal to inspection.  No discharge or erythema, or obvious scleral/conjunctival abnormalities.  RESP: No audible wheeze, cough, or visible cyanosis.  No visible retractions or increased work of breathing.  SKIN: Visible skin clear. No significant rash, abnormal pigmentation or lesions.  NEURO: Cranial nerves grossly intact.  Mentation and speech appropriate for age.  PSYCH: Mentation appears normal, affect normal/bright, judgement and insight intact, normal speech and appearance well-groomed.        Sincerely,    LOUIE GREEN PA-C      20 minutes spent on " the date of the encounter doing chart review, history and exam, documentation and further activities per the note      Again, thank you for allowing me to participate in the care of your patient.      Sincerely,    LOUIE GREEN PA-C

## 2023-03-01 NOTE — PATIENT INSTRUCTIONS
"Thank you for allowing us the privilege of caring for you. We hope we provided you with the excellent service you deserve.   Please let us know if there is anything else we can do for you so that we can be sure you are completely satisfied with your care experience.    To ensure the quality of our services you may be receiving a patient satisfaction survey from an independent patient satisfaction monitoring company.    The greatest compliment you can give is a \"Likely to Recommend\"    Your visit was with LOUIE GREEN PA-C today.    Instructions per today's visit:     Vishal Catherine, it was great to visit with you today.  Here is a review of our visit.  If our clinic scheduler is not able to reach you please call [phone] to schedule your next appointments.    1. Lab have been ordered.  Please make an appointment to have them drawn at your convenience.   To schedule the Lab Appointment using [employer]:  Select \"Schedule an Appointment\"  Select \"Lab Only\"  For \"A couple of questions\", select \"Other\"  For \"Which locations work for you?, select the location and set up the appointment  To schedule by phone call [phone] to schedule a lab only appointment at any [facility] lab.  2. Continue Saxenda 3.0mg once daily until Wegovy is available  3. Transition to Wegovy 1.7mg once weekly. Stop Saxenda the day prior to starting Wegovy once weekly.    4. Follow up with Arlen in 6 weeks       Information about Video Visits with [employer]: video visit information  _________________________________________________________________________________________________________________________________________________________  If you are asked by your clinic team to have your blood pressure checked:  [facility] do offer several locations for blood pressure checks. Please follow the below link to schedule an appointment. Scheduling an appointment at the pharmacy for a blood pressure check is now " preferred.    Appointment Plus ([email or web address])  _________________________________________________________________________________________________________________________________________________________  Important contact and scheduling information:  Please call our contact center at [phone] to schedule your next appointments.  To find a lab location near you, please call [phone].  For any nursing questions or concerns call Cortney White LPN at [phone] or Savi Viera RN at [phone]  Please call during clinic hours Monday through Friday 8:00a - 4:00p if you have questions or you can contact us via Logentries[employer] at anytime and we will reply during clinic hours.    Lab results will be communicated through My Chart or letter (if My Chart not used). Please call the clinic if you have not received communication after 1 week or if you have any questions.?  Clinic Fax: [phone]    _________________________________________________________________________________________________________________________________________________________  Meal Replacement Products:    Here is the link to our new e-store where you can purchase our meal replacement products    [facility] E-Store  [email or web address]    The one week starter kit is a great way to sample a variety of products and see what works for you.    If you want more information about the product go to: [employer]    If you are an employee or [facility] or [facility] please contact your care team for a 10% estore discount    Free Shipping for orders over $75     Benefits of meal replacements products:    Portion and calorie control  Improved nutrition  Structured eating  Simplified food choices  Avoid contact with trigger  foods  _________________________________________________________________________________________________________________________________________________________  Interested in working with a health ?  Health coaches work with you to improve your overall health and wellbeing.  They look at the whole person, and may involve discussion of different areas of life, including, but not limited to the four pillars of health (sleep, exercise, nutrition, and stress management). Discuss with your care team if you would like to start working a health .  Health Coaching-3 Pack: Schedule by calling [phone]    $99 for three health coaching visits    Visits may be done in person or via phone    Coaching is a partnership between the  and the client; Coaches do not prescribe or diagnose    Coaching helps inspire the client to reach his/her personal goals   _________________________________________________________________________________________________________________________________________________________  24 Week Healthy Lifestyle Plan:    Our mission in the 24-week Healthy Lifestyle Plan is to provide you with individualized care by giving you the tools, education and support you need to lose weight and maintain a healthy lifestyle. In your 24-week journey, you ll be supported by a dedicated weight loss team that includes registered dietitians, medical weight management providers, health coaches, and nurses -- all with special expertise in weight loss -- to help you every step of the way.     Monthly meetings with your registered dietician or medical weight management provider help to review your progress, update your care plan, and make any adjustments needed to ensure success. Between these visits, weekly and bi-weekly health  visits will help you focus on the four pillars of weight loss -- stress, sleep, nutrition, and exercise -- and how you can best adapt each to achieve sustainable weight loss  results.    In addition, you will be given exclusive access to online wellbeing classes through [employer].  Your initial visit will be with a medical weight management provider who will help to understand your weight loss goals and ensure this program is the right fit for you. Please let our team know if you are interested in the 24 week plan by sending a message to your care team or calling [phone] to schedule.  _________________________________________________________________________________________________________________________________________________________    COMPREHENSIVE WEIGHT MANAGEMENT PROGRAM  VIRTUAL SUPPORT GROUPS    For Support Group Information:      We offer support groups for patients who are working on weight loss and considering, preparing for or have had weight loss surgery.   There is no cost for this opportunity.  You are invited to attend the?Virtual Support Groups?provided by any of the following locations:    [facility] via [employer] Teams with Nicolle Monroe RN  2.   [place] via [employer] Teams with Grbaiel Chua, PhD, LP  3.   [place] via [employer] Teams with Leslie Alonso RN  4.   [facility] via [employer] Teams with Leslie Collins [facility]    The following Support Group information can also be found on our website:  [email or web address]    [facility] Weight Loss Surgery Support Group    [facility] Weight Loss Surgery Support Group  The support group is a patient-lead forum that meets monthly to share experiences, encouragement and education. It is open to those who have had weight loss surgery, are scheduled for surgery, and those who are considering surgery.   WHEN: This group meets on the 3rd Wednesday of each month from 5:00PM - 6:00PM virtually using Microsoft Teams.   FACILITATOR: Led by Nicolle Monroe RD, LD, RN, the program's Clinical Coordinator.   TO REGISTER: Please contact the  "clinic via [employer] or call the nurse line directly at [phone] to inform our staff that you would like an invite sent to you and to let us know the email you would like the invite sent to. Prior to the meeting, a link with directions on how to join the meeting will be sent to you.    2022 Meetings  Dina 15: \"Let's Talk\" a time for the group to share.  July 20: \"Let's Talk\" a time for the group to share.  August 17: \"Let's Talk\" a time for the group to share.  September 21: \"Let's Talk\" a time for the group to share.  October 19: Guest Speaker: Dr Buster Martini MD Pulmonologist and Sleep Medicine Physician, \"Getting a Good Night's Sleep\".  November 16: \"Let's Talk\" a time for the group to share.  December 21: \"Let's Talk\" a time for the group to share.    [facility] Support Groups    Connections: Bariatric Care Support Group?  This is open to all [facility] (and those external to this program) pre- and post- operative bariatric surgery patients as well as their support system.   WHEN: This group meets the 2nd Tuesday of each month from 6:30 PM - 8:00 PM virtually using Microsoft Teams.   FACILITATOR: Led by Grabiel Chua, Ph.D who is a Licensed Psychologist with the [facility] Comprehensive Weight Management Program.   TO REGISTER: Please send an email to Grabiel Chua, Ph.D., LP at?[email or web address]?if you would like an invitation to the group and to learn about using Microsoft Teams.    2022 Meetings  June 14: Kristin Manley, ILAN, LD at [facility], \"Nutritional Labeling\"  July 12 August 2 (Please Note Date Change)  September 13 October 11 November 8 December 13    Connections: Post-Operative Bariatric Surgery Support Group  This is a support group for [facility] bariatric patients (and those external to [facility]) who have had bariatric surgery and are at least 3 months post-surgery.  WHEN: This support group meets the " "4th Wednesday of the month from 11:00 AM - 12:00 PM virtually using Microsoft Teams.   FACILITATOR: Led by Certified Bariatric Nurse, Leslie Alonso RN.   TO REGISTER: Please send an email to Leslie at [email or web address] if you would like an invitation to the group and to learn about using Microsoft Teams.    2022 Meetings June 22 July 27 August 24 September 28 October 26 November 23 December 28      [facility] Healthy Lifestyle Virtual Support Group    Healthy Lifestyle Virtual Support Group?  This is 60 minutes of small group guided discussion, support and resources. All are welcome who want a healthy lifestyle.  WHEN: This group meets monthly on a Friday from 12:30 PM - 1:30 PM virtually using Microsoft Teams.   FACILITATOR: Led by National Board Certified Health and , Leslie Collins [facility].   TO REGISTER: Please send an email to Leslie [email or web address] to receive monthly invites to the group or if you have any questions about having a health .  Prior to the meeting, a link with directions on how to join the meeting will be sent to you.    2022 Meetings  June 24: Leslie Collins [facility], \"Setting Limits and Boundaries\".  Jul 29: Open Forum  August 26: Guest Speaker: Renetta Schaefer Registered Dietitian  September 30: Open Forum  October 28th: Guest Speaker: Marah Crowley [facility], Health , \"Gratitude Practices\".  November 18: Guest Speaker: Yovana Simmons RD Registered Dietitian, \"Navigating How to Eat around the Holidays\".  December 16: Guest Speaker: Mirela Watson [facility], \"Changing Your Relationship with Movement\".    ____________________________________________________________________________________________________________________________________________________________________________  [facility] Get Moving Program  Our team of physical therapists is trained to help you understand and take control of your " condition. They will perform a thorough evaluation to determine your ability for activity and develop a customized plan to fit your goals and physical ability.  Scheduling: Unsure if the Get Moving program is right for you? Discuss the program with your medical provider or diabetes educator. You can also call us at [phone] to ask questions or schedule an appointment.   МАРИНА Get Moving Program  ____________________________________________________________________________________________________________________________________________________________________________  [facility] Diabetes Prevention Program (DPP)  If you have prediabetes and Medicare please contact us via [employer] to learn more about the Diabetes Prevention Program (DPP)  Program Details:  [facility] offers the year-long Diabetes Prevention Program (DPP). The program helps you to make lifestyle changes that prevent or delay type 2 diabetes by supporting healthy eating, increased physical activity, stress reduction and use of coping skills.   On average, previous [facility] DPP cohorts have lost and maintained at least 5% of their starting weight throughout the program and averaged more than 150 minutes of physical activity per week.  Participants meet weekly for one-hour group sessions over sixteen weeks, every other week for the next 8 weeks, and monthly for the last six months.   A year-long maintenance program is also available for participants who complete the first year.   Location & Cost:   During the COVID-19 Public Health Emergency, the program is offered virtually. When in-person classes can resume, they will be held at [facility].  For people with Medicare, the program is covered in full. A self-pay option will also be available for those with non-Medicare insurance plans.    _________________________________________________________________________________________________________________________________________________________  Bluetooth Scale:    We hope to provide you with high quality virtual healthcare visits while social distancing for COVID-19 is necessary, as well as in the future when virtual visits may be more convenient for you.     Our technology team made it possible for Bluetooth scales to send weight measurements to our electronic medical record. This allows weights from you weighing at home to securely flow into the medical record, which will improve telephone and virtual visits.   Additionally, studies have shown that adults actually lose more weight when their weights are automatically sent to someone else, and also that this process is not stressful for those adults.    Below is a link for purchasing the scale, with a discount code for our patients. You may call your insurance company to see if they will reimburse you for the cost of the scale, as a piece of durable medical equipment. The scales only go up to a weight of 400 pounds. This is an issue and we are working with the developer on increasing this. We found no scales that go over 400lb that have blue-tooth for connecting to [employer].    Scale to purchase: the [employer]  Body  Scale: https://[email or web address]/en/body/shop?[email or web address]&gclsrc=aw.ds    Discount Code: We have a discount code for our patients to bring the cost down to $50, Discount code is: [place]_Scale_20%off  _______________________________________________________________________________________________________________________________________________________________________________    To work with a Behavioral Health Psychologist:    Call to schedule:    Diego Mcclure - [phone]  Katie Palm - [phone]  Franchesca Cruz - [phone]  Heather Lackey - [phone]   Sana Gates PhD  (cannot accept Medicare) [phone]        Thank you,   [facility] Comprehensive Weight Management Team

## 2023-03-01 NOTE — LETTER
Date:March 2, 2023      Provider requested that no letter be sent. Do not send.       [facility]

## 2023-03-01 NOTE — TELEPHONE ENCOUNTER
Prior authorization is needed. Please start PA.    HP PMAP    RX BIN: [id number]  RX PCN: [id number]  RX ID:[id number]  RX GRP:[id number]    Thank you,     Lee Ramos [facility]  Pharmacy Clinic [facility]  [email or web address]   Phone: [phone]  Fax: [phone]

## 2023-03-01 NOTE — PROGRESS NOTES
"Fatmata Catherine is a 27 year old who is being evaluated via a billable video visit.      How would you like to obtain your AVS? MyChart  If the video visit is dropped, the invitation should be resent by: Text to cell phone: [phone]  Will anyone else be joining your video visit? No  If patient encounters technical issues they should call [phone]    During this virtual visit the patient is located in [place], patient verifies this as the location during the entirety of this visit.     Video-Visit Details  Video Start Time: {video visit start/end time for provider to select:771115}    Type of service:  Video Visit    Video End Time:{video visit start/end time for provider to select:152948}    Originating Location (pt. Location): {video visit patient location:715958::\"Home\"}    Distant Location (provider location):  {virtual location provider:291270}    Platform used for Video Visit: {Virtual Visit Platforms:743698::\"AmWell\"}    Varun Hutchinson, EMT 3/1/2023      6:57 AM    "

## 2023-03-01 NOTE — PROGRESS NOTES
Fatmata Catherine is a 27 year old who is being evaluated via a billable video visit.      How would you like to obtain your AVS? MyChart  If the video visit is dropped, the invitation should be resent by: Text to cell phone: [phone]  Will anyone else be joining your video visit? No  If patient encounters technical issues they should call [phone]    During this virtual visit the patient is located in [place], patient verifies this as the location during the entirety of this visit.     Video-Visit Details  Video Start Time: 7:32AM    Type of service:  Video Visit    Video End Time:7:43AM    Originating Location (pt. Location): Home    Distant Location (provider location):  Off-site    Platform used for Video Visit: [facility] Medical Weight Management Note     Fatmata Catherine  MRN:  [id number]  :  1995  VAISHALI:  3/1/2023    Dear Physician No Ref-Primary,    I had the pleasure of seeing your patient Fatmata Catherine. She is a 27 year old female who I am continuing to see for treatment of obesity related to:       10/5/2022   I have the following health issues associated with obesity: High Blood Pressure, Stress Incontinence   I have the following symptoms associated with obesity: Depression, Back Pain, Fatigue, Irregular Menstral Cycle       Assessment & Plan   Problem List Items Addressed This Visit        Digestive    Class 2 severe obesity with serious comorbidity and body mass index (BMI) of 38.0 to 38.9 in adult, unspecified obesity type (H) - Primary    Relevant Medications    Semaglutide-Weight Management (WEGOVY) 1.7 MG/0.75ML pen      1. Labs to be collected   2. Continue Saxenda 3.0mg once daily until Wegovy is available  3. Transition to Wegovy 1.7mg once weekly. Stop Saxenda the day prior to starting Wegovy once weekly.    4. Follow up with Edyta Manzano in 6 weeks     INTERVAL HISTORY:  First seen for New MWM - 10/5/2023  Started Saxenda    Has lost 15lbs since last visit. Has not weighed  herself recently. But clothes are not currently feeling as loose. She is concerned she is regaining weight.     Anti-obesity medications:     Current:   Saxenda 3.0mg - Has been at this dose for around 2 months. Taking it daily. at first was really helpful. Is hard to do an injection every day. No side effects. Is feeling hungry all the time again.       Recent diet changes: Is eating a smaller lunch - usually chips + diet coke. Dinner is whatever is available. Increase snacking throughout the day.     Recent exercise/activity changes: Very limited currently during to work schedule. Will not come home till 10pm. Has been harder with the weather.     Recent stressors:  Increase stress with weight regain. Feels that her self confidence has really not been good because of it.     Recent sleep changes: Stable. Has been able to sleep more since son is sleeping through the night         CURRENT WEIGHT:   175 lbs 0 oz    Initial Weight (lbs): 189 lbs  Last Visits Weight: 85.7 kg (189 lb)  Cumulative weight loss (lbs): 14  Weight Loss Percentage: 7.41%    Changes and Difficulties 3/1/2023   I have made the following changes to my diet since my last visit: none   With regards to my diet, I am still struggling with: hard to take medication, hungry when sitting at desk. hard to avoid snacks   I have made the following changes to my activity/exercise since my last visit: trying to go to gym more in apartment   With regards to my activity/exercise, I am still struggling with: busy with kids and work         MEDICATIONS:   Current Outpatient Medications   Medication Sig Dispense Refill     buPROPion (WELLBUTRIN SR) 150 MG 12 hr tablet Take 150 mg by mouth       citalopram (CELEXA) 40 MG tablet Take 40 mg by mouth daily       clindamycin (CLINDAMAX) 1 % external gel APPLY TOPICALLY TO THE AFFECTED AREA TWICE DAILY       insulin pen needle (31G X 5 MM) 31G X 5 MM miscellaneous Use Saxenda pen needles daily or as directed. 100 each  "3     liraglutide - Weight Management (SAXENDA) 18 MG/3ML pen Week 1: 0.6 mg subcutaneous daily, Week 2: 1.2 mg daily, Week 3: 1.8 mg daily, Week 4: 2.4 mg daily, Week 5 & on: 3 mg daily 15 mL 2     NYSTOP 251455 UNIT/GM powder APPLY A THIN LAYER TO INCISION SITE TWICE DAILY AS NEEDED       Semaglutide-Weight Management (WEGOVY) 1.7 MG/0.75ML pen Inject 1.7 mg Subcutaneous once a week 3 mL 2     VENTOLIN  (90 Base) MCG/ACT inhaler INHALE 1 PUFF BY MOUTH EVERY 4 HOURS AS NEEDED       VITAMIN D3 50 MCG (2000 UT) tablet Take 1 tablet by mouth daily         Weight Loss Medication History Reviewed With Patient 3/1/2023   Which weight loss medications are you currently taking on a regular basis?  Ozempic, Bupropion   Are you having any side effects from the weight loss medication that we have prescribed you? No             Objective    Ht 1.499 m (4' 11\")   Wt 79.4 kg (175 lb)   BMI 35.35 kg/m      Vitals - Patient Reported  Pain Score: No Pain (0)    PHYSICAL EXAM:    GENERAL: Healthy, alert and no distress  EYES: Eyes grossly normal to inspection.  No discharge or erythema, or obvious scleral/conjunctival abnormalities.  RESP: No audible wheeze, cough, or visible cyanosis.  No visible retractions or increased work of breathing.  SKIN: Visible skin clear. No significant rash, abnormal pigmentation or lesions.  NEURO: Cranial nerves grossly intact.  Mentation and speech appropriate for age.  PSYCH: Mentation appears normal, affect normal/bright, judgement and insight intact, normal speech and appearance well-groomed.        Sincerely,    LOUIE GREEN PA-C      20 minutes spent on the date of the encounter doing chart review, history and exam, documentation and further activities per the note  "

## 2023-03-02 ENCOUNTER — TELEPHONE (OUTPATIENT)
Dept: ENDOCRINOLOGY | Facility: CLINIC | Age: 28
End: 2023-03-02
Payer: COMMERCIAL

## 2023-03-02 NOTE — TELEPHONE ENCOUNTER
"JOIEM and cheryl sent    Schedule:  \"Video visit return\" with Edyta Do for RET MWM 6wk follow up (around 4/12/23)    Can schedule \"return weight management\" in person visit for sooner availability if patient prefers   "

## 2023-03-05 NOTE — TELEPHONE ENCOUNTER
PA Initiation    Medication: Semaglutide-Weight Management (WEGOVY) 1.7 MG/0.75ML pen   Insurance Company: [email or web address] - Phone [phone] Fax [phone]  Pharmacy Filling the Rx: [id number] [place], [place] - [address] [facility] [address] [place]  Filling Pharmacy Phone: [phone]  Filling Pharmacy Fax: [phone]  Start Date: 3/4/2023

## 2023-03-06 ENCOUNTER — MYC MEDICAL ADVICE (OUTPATIENT)
Dept: ENDOCRINOLOGY | Facility: CLINIC | Age: 28
End: 2023-03-06
Payer: COMMERCIAL

## 2023-03-06 DIAGNOSIS — E66.01 CLASS 2 SEVERE OBESITY WITH SERIOUS COMORBIDITY AND BODY MASS INDEX (BMI) OF 38.0 TO 38.9 IN ADULT, UNSPECIFIED OBESITY TYPE (H): Primary | ICD-10-CM

## 2023-03-06 DIAGNOSIS — E66.812 CLASS 2 SEVERE OBESITY WITH SERIOUS COMORBIDITY AND BODY MASS INDEX (BMI) OF 38.0 TO 38.9 IN ADULT, UNSPECIFIED OBESITY TYPE (H): Primary | ICD-10-CM

## 2023-03-06 NOTE — TELEPHONE ENCOUNTER
PA approved. $3 co-pay. Script released to preferred pharmacy.    Thank you,     Lee Ramos [facility]  Pharmacy Clinic [facility]  [email or web address]   Phone: [phone]  Fax: [phone]

## 2023-03-06 NOTE — TELEPHONE ENCOUNTER
Prior Authorization Approval    Authorization Effective Date: 2/2/2023  Authorization Expiration Date: 3/3/2024  Medication: Semaglutide-Weight Management (WEGOVY) 1.7 MG/0.75ML pen--APPROVED  Approved Dose/Quantity:   Reference #:     Insurance Company: [employer] - Phone [phone] Fax [phone]  Expected CoPay:       CoPay Card Available:      Foundation Assistance Needed:    Which Pharmacy is filling the prescription (Not needed for infusion/clinic administered): [id number] [place], [place] - [facility] [address] AT [facility] [address] [facility]  Pharmacy Notified: Yes  Patient Notified: Yes **Instructed pharmacy to notify patient when script is ready to /ship.**

## 2023-03-27 RX ORDER — SEMAGLUTIDE 1 MG/.5ML
1 INJECTION, SOLUTION SUBCUTANEOUS WEEKLY
Qty: 2 ML | Refills: 0 | Status: SHIPPED | OUTPATIENT
Start: 2023-03-27 | End: 2023-11-06

## 2023-03-27 RX ORDER — ONDANSETRON 4 MG/1
4 TABLET, FILM COATED ORAL EVERY 6 HOURS PRN
Qty: 15 TABLET | Refills: 0 | Status: SHIPPED | OUTPATIENT
Start: 2023-03-27 | End: 2023-11-06

## 2023-05-06 DIAGNOSIS — E66.812 CLASS 2 SEVERE OBESITY WITH SERIOUS COMORBIDITY AND BODY MASS INDEX (BMI) OF 38.0 TO 38.9 IN ADULT, UNSPECIFIED OBESITY TYPE (H): ICD-10-CM

## 2023-05-06 DIAGNOSIS — E66.01 CLASS 2 SEVERE OBESITY WITH SERIOUS COMORBIDITY AND BODY MASS INDEX (BMI) OF 38.0 TO 38.9 IN ADULT, UNSPECIFIED OBESITY TYPE (H): ICD-10-CM

## 2023-05-10 RX ORDER — SEMAGLUTIDE 1 MG/.5ML
1 INJECTION, SOLUTION SUBCUTANEOUS WEEKLY
Qty: 2 ML | Refills: 0 | OUTPATIENT
Start: 2023-05-10

## 2023-06-21 ENCOUNTER — NURSE TRIAGE (OUTPATIENT)
Dept: NURSING | Facility: CLINIC | Age: 28
End: 2023-06-21
Payer: COMMERCIAL

## 2023-06-22 NOTE — TELEPHONE ENCOUNTER
Pt found out she is pregnant today. 10 weeks. Been having anxiety symptoms for couple of hours now, feeling Jittery, unable to concentrate, sweaty in hands, slight chest pain  Pt is wanting to know what she can take for anxiety, hydroxyzine, citalopram    Triage to ED, pt does not have established PCP. Advised to contact local pharmacist for further recommendation on medications and dosage.  Pt disconnected call.    Ronald Freire RN, BSN  6/21/2023 at 9:06 PM  [facility]      Reason for Disposition    Patient sounds very sick or weak to the triager    Additional Information    Negative: SEVERE difficulty breathing (e.g., struggling for each breath, speaks in single words)    Negative: Bluish (or gray) lips or face now    Negative: Difficult to awaken or acting confused (e.g., disoriented, slurred speech)    Negative: Hysterical or combative behavior    Negative: Sounds like a life-threatening emergency to the triager    Negative: [1] Difficulty breathing AND [2] persists > 10 minutes AND [3] not relieved by reassurance provided by triager    Negative: [1] Lightheadedness or dizziness AND [2] persists > 10 minutes AND [3] not relieved by reassurance provided by triager    Negative: [1] Alcohol or drug use, known or suspected AND [2] feeling very shaky (i.e., visible tremors of hands)    Protocols used: ANXIETY AND PANIC ATTACK-A-AH

## 2023-10-21 ENCOUNTER — HEALTH MAINTENANCE LETTER (OUTPATIENT)
Age: 28
End: 2023-10-21

## 2023-11-06 ENCOUNTER — APPOINTMENT (OUTPATIENT)
Dept: ULTRASOUND IMAGING | Facility: CLINIC | Age: 28
End: 2023-11-06
Attending: EMERGENCY MEDICINE
Payer: COMMERCIAL

## 2023-11-06 ENCOUNTER — APPOINTMENT (OUTPATIENT)
Dept: CT IMAGING | Facility: CLINIC | Age: 28
End: 2023-11-06
Attending: EMERGENCY MEDICINE
Payer: COMMERCIAL

## 2023-11-06 ENCOUNTER — APPOINTMENT (OUTPATIENT)
Dept: ULTRASOUND IMAGING | Facility: CLINIC | Age: 28
End: 2023-11-06
Attending: ADVANCED PRACTICE MIDWIFE
Payer: COMMERCIAL

## 2023-11-06 ENCOUNTER — HOSPITAL ENCOUNTER (OUTPATIENT)
Facility: CLINIC | Age: 28
LOS: 1 days | Discharge: HOME | End: 2023-11-06
Attending: ADVANCED PRACTICE MIDWIFE | Admitting: ADVANCED PRACTICE MIDWIFE
Payer: COMMERCIAL

## 2023-11-06 ENCOUNTER — HOSPITAL ENCOUNTER (OUTPATIENT)
Facility: CLINIC | Age: 28
Setting detail: OBSERVATION
Discharge: HOME | End: 2023-11-07
Attending: EMERGENCY MEDICINE | Admitting: EMERGENCY MEDICINE
Payer: COMMERCIAL

## 2023-11-06 DIAGNOSIS — M54.9 UPPER BACK PAIN ON RIGHT SIDE: ICD-10-CM

## 2023-11-06 DIAGNOSIS — R10.11 RIGHT UPPER QUADRANT PAIN: ICD-10-CM

## 2023-11-06 DIAGNOSIS — R11.0 NAUSEA: ICD-10-CM

## 2023-11-06 DIAGNOSIS — R52 PAIN: Primary | ICD-10-CM

## 2023-11-06 PROBLEM — O34.219 HISTORY OF CESAREAN DELIVERY, ANTEPARTUM: Status: ACTIVE | Noted: 2021-12-28

## 2023-11-06 PROBLEM — Z36.89 ENCOUNTER FOR TRIAGE IN PREGNANT PATIENT: Status: ACTIVE | Noted: 2021-12-16

## 2023-11-06 LAB
ALBUMIN SERPL BCG-MCNC: 3 G/DL (ref 3.5–5.2)
ALBUMIN UR-MCNC: NEGATIVE MG/DL
ALP SERPL-CCNC: 109 U/L (ref 35–104)
ALT SERPL W P-5'-P-CCNC: 18 U/L (ref 0–50)
ANION GAP SERPL CALCULATED.3IONS-SCNC: 10 MMOL/L (ref 7–15)
APPEARANCE UR: CLEAR
AST SERPL W P-5'-P-CCNC: 28 U/L (ref 0–45)
BACTERIA #/AREA URNS HPF: ABNORMAL /HPF
BILIRUB DIRECT SERPL-MCNC: 0.28 MG/DL (ref 0–0.3)
BILIRUB SERPL-MCNC: 0.4 MG/DL
BILIRUB UR QL STRIP: NEGATIVE
BUN SERPL-MCNC: 5.6 MG/DL (ref 6–20)
CALCIUM SERPL-MCNC: 8.6 MG/DL (ref 8.6–10)
CHLORIDE SERPL-SCNC: 104 MMOL/L (ref 98–107)
CLUE CELLS: ABNORMAL
COLOR UR AUTO: ABNORMAL
CREAT SERPL-MCNC: 0.5 MG/DL (ref 0.51–0.95)
D DIMER PPP FEU-MCNC: 1.15 UG/ML FEU (ref 0–0.5)
DEPRECATED HCO3 PLAS-SCNC: 22 MMOL/L (ref 22–29)
EGFRCR SERPLBLD CKD-EPI 2021: >90 ML/MIN/1.73M2
ERYTHROCYTE [DISTWIDTH] IN BLOOD BY AUTOMATED COUNT: 13.7 % (ref 10–15)
FFN SPECIMEN INTEGRITY: NORMAL
FIBRONECTIN FETAL VAG QL: NEGATIVE
GLUCOSE SERPL-MCNC: 108 MG/DL (ref 70–99)
GLUCOSE UR STRIP-MCNC: NEGATIVE MG/DL
HCT VFR BLD AUTO: 28.4 % (ref 35–47)
HGB BLD-MCNC: 9 G/DL (ref 11.7–15.7)
HGB UR QL STRIP: NEGATIVE
KETONES UR STRIP-MCNC: NEGATIVE MG/DL
LACTATE SERPL-SCNC: 0.9 MMOL/L (ref 0.7–2)
LEUKOCYTE ESTERASE UR QL STRIP: NEGATIVE
LIPASE SERPL-CCNC: 22 U/L (ref 13–60)
MCH RBC QN AUTO: 27.5 PG (ref 26.5–33)
MCHC RBC AUTO-ENTMCNC: 31.7 G/DL (ref 31.5–36.5)
MCV RBC AUTO: 87 FL (ref 78–100)
MUCOUS THREADS #/AREA URNS LPF: PRESENT /LPF
NITRATE UR QL: NEGATIVE
PH UR STRIP: 6 [PH] (ref 5–7)
PLATELET # BLD AUTO: 338 10E3/UL (ref 150–450)
POTASSIUM SERPL-SCNC: 3.7 MMOL/L (ref 3.4–5.3)
PROT SERPL-MCNC: 6 G/DL (ref 6.4–8.3)
RBC # BLD AUTO: 3.27 10E6/UL (ref 3.8–5.2)
RBC URINE: 0 /HPF
SODIUM SERPL-SCNC: 136 MMOL/L (ref 135–145)
SP GR UR STRIP: 1.01 (ref 1–1.03)
SQUAMOUS EPITHELIAL: 1 /HPF
TRICHOMONAS, WET PREP: ABNORMAL
UROBILINOGEN UR STRIP-MCNC: <2 MG/DL
WBC # BLD AUTO: 9.7 10E3/UL (ref 4–11)
WBC URINE: 1 /HPF
WBC'S/HIGH POWER FIELD, WET PREP: ABNORMAL
YEAST, WET PREP: ABNORMAL

## 2023-11-06 PROCEDURE — 36415 COLL VENOUS BLD VENIPUNCTURE: CPT | Performed by: EMERGENCY MEDICINE

## 2023-11-06 PROCEDURE — 250N000013 HC RX MED GY IP 250 OP 250 PS 637: Performed by: ADVANCED PRACTICE MIDWIFE

## 2023-11-06 PROCEDURE — 83605 ASSAY OF LACTIC ACID: CPT | Performed by: EMERGENCY MEDICINE

## 2023-11-06 PROCEDURE — 82248 BILIRUBIN DIRECT: CPT | Performed by: EMERGENCY MEDICINE

## 2023-11-06 PROCEDURE — 250N000011 HC RX IP 250 OP 636: Performed by: EMERGENCY MEDICINE

## 2023-11-06 PROCEDURE — 258N000003 HC RX IP 258 OP 636: Performed by: EMERGENCY MEDICINE

## 2023-11-06 PROCEDURE — 250N000011 HC RX IP 250 OP 636

## 2023-11-06 PROCEDURE — 82731 ASSAY OF FETAL FIBRONECTIN: CPT | Performed by: ADVANCED PRACTICE MIDWIFE

## 2023-11-06 PROCEDURE — 76805 OB US >/= 14 WKS SNGL FETUS: CPT

## 2023-11-06 PROCEDURE — 85379 FIBRIN DEGRADATION QUANT: CPT | Performed by: EMERGENCY MEDICINE

## 2023-11-06 PROCEDURE — 96374 THER/PROPH/DIAG INJ IV PUSH: CPT | Mod: 59

## 2023-11-06 PROCEDURE — 87210 SMEAR WET MOUNT SALINE/INK: CPT | Performed by: ADVANCED PRACTICE MIDWIFE

## 2023-11-06 PROCEDURE — 250N000013 HC RX MED GY IP 250 OP 250 PS 637

## 2023-11-06 PROCEDURE — 250N000011 HC RX IP 250 OP 636: Mod: JZ | Performed by: EMERGENCY MEDICINE

## 2023-11-06 PROCEDURE — 99285 EMERGENCY DEPT VISIT HI MDM: CPT | Mod: 25

## 2023-11-06 PROCEDURE — 81001 URINALYSIS AUTO W/SCOPE: CPT | Performed by: ADVANCED PRACTICE MIDWIFE

## 2023-11-06 PROCEDURE — 83690 ASSAY OF LIPASE: CPT | Performed by: EMERGENCY MEDICINE

## 2023-11-06 PROCEDURE — 250N000011 HC RX IP 250 OP 636: Performed by: ADVANCED PRACTICE MIDWIFE

## 2023-11-06 PROCEDURE — 96376 TX/PRO/DX INJ SAME DRUG ADON: CPT

## 2023-11-06 PROCEDURE — 250N000013 HC RX MED GY IP 250 OP 250 PS 637: Performed by: EMERGENCY MEDICINE

## 2023-11-06 PROCEDURE — G0378 HOSPITAL OBSERVATION PER HR: HCPCS

## 2023-11-06 PROCEDURE — 80053 COMPREHEN METABOLIC PANEL: CPT | Performed by: EMERGENCY MEDICINE

## 2023-11-06 PROCEDURE — G0463 HOSPITAL OUTPT CLINIC VISIT: HCPCS | Mod: 25

## 2023-11-06 PROCEDURE — 76770 US EXAM ABDO BACK WALL COMP: CPT

## 2023-11-06 PROCEDURE — 96375 TX/PRO/DX INJ NEW DRUG ADDON: CPT

## 2023-11-06 PROCEDURE — 85027 COMPLETE CBC AUTOMATED: CPT | Performed by: EMERGENCY MEDICINE

## 2023-11-06 PROCEDURE — 76700 US EXAM ABDOM COMPLETE: CPT

## 2023-11-06 PROCEDURE — 71275 CT ANGIOGRAPHY CHEST: CPT

## 2023-11-06 RX ORDER — NALOXONE HYDROCHLORIDE 0.4 MG/ML
0.2 INJECTION, SOLUTION INTRAMUSCULAR; INTRAVENOUS; SUBCUTANEOUS
Status: DISCONTINUED | OUTPATIENT
Start: 2023-11-06 | End: 2023-11-06 | Stop reason: HOSPADM

## 2023-11-06 RX ORDER — FENTANYL CITRATE 50 UG/ML
25 INJECTION, SOLUTION INTRAMUSCULAR; INTRAVENOUS ONCE
Status: COMPLETED | OUTPATIENT
Start: 2023-11-06 | End: 2023-11-06

## 2023-11-06 RX ORDER — BUPROPION HYDROCHLORIDE 300 MG/1
300 TABLET ORAL EVERY MORNING
COMMUNITY

## 2023-11-06 RX ORDER — HYDROMORPHONE HYDROCHLORIDE 1 MG/ML
0.5 INJECTION, SOLUTION INTRAMUSCULAR; INTRAVENOUS; SUBCUTANEOUS ONCE
Status: COMPLETED | OUTPATIENT
Start: 2023-11-06 | End: 2023-11-06

## 2023-11-06 RX ORDER — FENTANYL CITRATE 50 UG/ML
25 INJECTION, SOLUTION INTRAMUSCULAR; INTRAVENOUS
Status: DISCONTINUED | OUTPATIENT
Start: 2023-11-06 | End: 2023-11-06 | Stop reason: HOSPADM

## 2023-11-06 RX ORDER — CITALOPRAM HYDROBROMIDE 10 MG/1
40 TABLET ORAL DAILY
Status: DISCONTINUED | OUTPATIENT
Start: 2023-11-07 | End: 2023-11-07 | Stop reason: HOSPADM

## 2023-11-06 RX ORDER — BUPROPION HYDROCHLORIDE 150 MG/1
150 TABLET ORAL EVERY MORNING
COMMUNITY
End: 2023-11-06

## 2023-11-06 RX ORDER — HYDROMORPHONE HYDROCHLORIDE 1 MG/ML
0.5 INJECTION, SOLUTION INTRAMUSCULAR; INTRAVENOUS; SUBCUTANEOUS EVERY 4 HOURS PRN
Status: DISCONTINUED | OUTPATIENT
Start: 2023-11-06 | End: 2023-11-07 | Stop reason: HOSPADM

## 2023-11-06 RX ORDER — BUPROPION HYDROCHLORIDE 150 MG/1
150 TABLET, EXTENDED RELEASE ORAL EVERY MORNING
Status: DISCONTINUED | OUTPATIENT
Start: 2023-11-07 | End: 2023-11-07 | Stop reason: HOSPADM

## 2023-11-06 RX ORDER — ONDANSETRON 2 MG/ML
4 INJECTION INTRAMUSCULAR; INTRAVENOUS ONCE
Status: COMPLETED | OUTPATIENT
Start: 2023-11-06 | End: 2023-11-06

## 2023-11-06 RX ORDER — BUPROPION HYDROCHLORIDE 300 MG/1
300 TABLET ORAL EVERY MORNING
Status: DISCONTINUED | OUTPATIENT
Start: 2023-11-07 | End: 2023-11-07 | Stop reason: HOSPADM

## 2023-11-06 RX ORDER — ONDANSETRON 2 MG/ML
4 INJECTION INTRAMUSCULAR; INTRAVENOUS EVERY 6 HOURS PRN
Status: DISCONTINUED | OUTPATIENT
Start: 2023-11-06 | End: 2023-11-07 | Stop reason: HOSPADM

## 2023-11-06 RX ORDER — NALOXONE HYDROCHLORIDE 0.4 MG/ML
0.4 INJECTION, SOLUTION INTRAMUSCULAR; INTRAVENOUS; SUBCUTANEOUS
Status: DISCONTINUED | OUTPATIENT
Start: 2023-11-06 | End: 2023-11-06 | Stop reason: HOSPADM

## 2023-11-06 RX ORDER — ONDANSETRON 4 MG/1
4 TABLET, ORALLY DISINTEGRATING ORAL EVERY 6 HOURS PRN
Status: DISCONTINUED | OUTPATIENT
Start: 2023-11-06 | End: 2023-11-07 | Stop reason: HOSPADM

## 2023-11-06 RX ORDER — ACETAMINOPHEN 325 MG/1
975 TABLET ORAL EVERY 6 HOURS PRN
Status: DISCONTINUED | OUTPATIENT
Start: 2023-11-06 | End: 2023-11-06 | Stop reason: HOSPADM

## 2023-11-06 RX ORDER — BUPROPION HYDROCHLORIDE 150 MG/1
150 TABLET, EXTENDED RELEASE ORAL EVERY MORNING
COMMUNITY
End: 2024-01-30

## 2023-11-06 RX ORDER — AMOXICILLIN 250 MG
1 CAPSULE ORAL 2 TIMES DAILY PRN
Status: DISCONTINUED | OUTPATIENT
Start: 2023-11-06 | End: 2023-11-07 | Stop reason: HOSPADM

## 2023-11-06 RX ORDER — FENTANYL CITRATE 50 UG/ML
INJECTION, SOLUTION INTRAMUSCULAR; INTRAVENOUS
Status: COMPLETED
Start: 2023-11-06 | End: 2023-11-06

## 2023-11-06 RX ORDER — POLYETHYLENE GLYCOL 3350 17 G/17G
17 POWDER, FOR SOLUTION ORAL DAILY PRN
Status: DISCONTINUED | OUTPATIENT
Start: 2023-11-06 | End: 2023-11-07 | Stop reason: HOSPADM

## 2023-11-06 RX ORDER — FAMOTIDINE 20 MG/1
40 TABLET, FILM COATED ORAL 2 TIMES DAILY
Status: DISCONTINUED | OUTPATIENT
Start: 2023-11-06 | End: 2023-11-07 | Stop reason: HOSPADM

## 2023-11-06 RX ORDER — LIDOCAINE 4 G/G
1 PATCH TOPICAL
Status: DISCONTINUED | OUTPATIENT
Start: 2023-11-06 | End: 2023-11-06

## 2023-11-06 RX ORDER — LIDOCAINE 4 G/G
1 PATCH TOPICAL ONCE
Status: DISCONTINUED | OUTPATIENT
Start: 2023-11-06 | End: 2023-11-06 | Stop reason: HOSPADM

## 2023-11-06 RX ORDER — ONDANSETRON 2 MG/ML
8 INJECTION INTRAMUSCULAR; INTRAVENOUS ONCE
Status: COMPLETED | OUTPATIENT
Start: 2023-11-06 | End: 2023-11-06

## 2023-11-06 RX ORDER — IOPAMIDOL 755 MG/ML
90 INJECTION, SOLUTION INTRAVASCULAR ONCE
Status: COMPLETED | OUTPATIENT
Start: 2023-11-06 | End: 2023-11-06

## 2023-11-06 RX ORDER — LIDOCAINE 40 MG/G
CREAM TOPICAL
Status: DISCONTINUED | OUTPATIENT
Start: 2023-11-06 | End: 2023-11-06 | Stop reason: HOSPADM

## 2023-11-06 RX ORDER — OXYCODONE HYDROCHLORIDE 5 MG/1
5 TABLET ORAL ONCE
Status: COMPLETED | OUTPATIENT
Start: 2023-11-06 | End: 2023-11-06

## 2023-11-06 RX ORDER — FAMOTIDINE 40 MG/1
40 TABLET, FILM COATED ORAL 2 TIMES DAILY
COMMUNITY
End: 2024-01-30

## 2023-11-06 RX ORDER — AMOXICILLIN 250 MG
2 CAPSULE ORAL 2 TIMES DAILY PRN
Status: DISCONTINUED | OUTPATIENT
Start: 2023-11-06 | End: 2023-11-07 | Stop reason: HOSPADM

## 2023-11-06 RX ADMIN — HYDROMORPHONE HYDROCHLORIDE 0.5 MG: 1 INJECTION, SOLUTION INTRAMUSCULAR; INTRAVENOUS; SUBCUTANEOUS at 20:24

## 2023-11-06 RX ADMIN — OXYCODONE HYDROCHLORIDE 5 MG: 5 TABLET ORAL at 19:22

## 2023-11-06 RX ADMIN — FENTANYL CITRATE 25 MCG: 50 INJECTION INTRAMUSCULAR; INTRAVENOUS at 15:10

## 2023-11-06 RX ADMIN — FENTANYL CITRATE 25 MCG: 50 INJECTION, SOLUTION INTRAMUSCULAR; INTRAVENOUS at 14:08

## 2023-11-06 RX ADMIN — LIDOCAINE 1 PATCH: 4 PATCH TOPICAL at 16:00

## 2023-11-06 RX ADMIN — HYDROMORPHONE HYDROCHLORIDE 0.5 MG: 1 INJECTION, SOLUTION INTRAMUSCULAR; INTRAVENOUS; SUBCUTANEOUS at 16:56

## 2023-11-06 RX ADMIN — IOPAMIDOL 90 ML: 755 INJECTION, SOLUTION INTRAVENOUS at 18:38

## 2023-11-06 RX ADMIN — FAMOTIDINE 40 MG: 20 TABLET ORAL at 22:21

## 2023-11-06 RX ADMIN — ACETAMINOPHEN 975 MG: 325 TABLET ORAL at 14:09

## 2023-11-06 RX ADMIN — ONDANSETRON 8 MG: 2 INJECTION INTRAMUSCULAR; INTRAVENOUS at 16:56

## 2023-11-06 RX ADMIN — FENTANYL CITRATE 25 MCG: 50 INJECTION, SOLUTION INTRAMUSCULAR; INTRAVENOUS at 15:10

## 2023-11-06 RX ADMIN — SODIUM CHLORIDE 500 ML: 9 INJECTION, SOLUTION INTRAVENOUS at 16:56

## 2023-11-06 ASSESSMENT — ACTIVITIES OF DAILY LIVING (ADL)
ADLS_ACUITY_SCORE: 35
ADLS_ACUITY_SCORE: 18
ADLS_ACUITY_SCORE: 35

## 2023-11-06 NOTE — PROGRESS NOTES
Pt seen in triage for severe upper back pain that is located on the upper right side of her back at or just under her bra strap.     Fetal monitoring completed. EFM shows a category 1 fetal heart tracing. 2 mild contractions were noted the patient denies feeling these contractions.     UA, wet prep, FFN, and abdominal US all completed and essentially WNL.     Pt discharged from L&D to be evaluated for pain in the ED.    Jackelyn Bonilla RN

## 2023-11-06 NOTE — ED NOTES
Pt states that she has had a decrease in pain under her Rt rib but the pain has moved to her lower back. She states that she still is feeling more comfortable after pain medication. She also states that her baby is moving around per normal at this time.

## 2023-11-06 NOTE — H&P
HOSPITAL TRIAGE NOTE  ===================    CHIEF COMPLAINT  ========================  Fatmata Catherine is a 28 year old patient  presenting today at 30w0d for evaluation of severe back pain. Patient appears very uncomfortable and reports that severe back pain began at approximately 1130 today. Denies any precipitating injury. She describes the pain as constant 8-9/10, starting at her tailbone, sometimes radiating up to midback. It has recently also been radiating to the right side of her mid back in a bra strap area. Denies abdominal pain or contractions. Denies any change in vaginal discharge.Last BM was today. She denies symptoms of UTI and has never had a kidney infection. She is being treated for a yeast infection diagnosed last week.    She has felt pain similar to this while in labor with her first child. She plans a repeat . Her last child was born 20 months ago also via repeat . Uterus palapsted for 5 minutes and through pain. Remained soft throughout.    Estimated Date of Delivery: Boris 15, 2024   Has  planned for 39 weeks    HPI  ==================     CONTRACTIONS: none per palpation  ABDOMINAL PAIN: none  FETAL MOVEMENT: active    VAGINAL BLEEDING: none  RUPTURE OF MEMBRANES: no  PELVIC PAIN: none. Has noted increased pelvic pressure in last 4 days.    PREGNANCY COMPLICATIONS:   Obesity BMI 35  Hx of 2 c-sections - last baby born 20 months ago  Hx severe PPD with PP psychosis  Preeclampsia postpartum with last child      REVIEW OF SYSTEMS  =====================  C: NEGATIVE for fever, chills  I: NEGATIVE for worrisome rashes, moles or lesions  E: NEGATIVE for vision changes or irritation  R: NEGATIVE for significant cough or SOB  CV: NEGATIVE for chest pain, palpitations or varicosities  GI: NEGATIVE for nausea, abdominal pain, heartburn, or change in bowel habits  : NEGATIVE for frequency, dysuria, or hematuria  M: NEGATIVE for significant arthralgias or myalgia  except for back pain as above  N: NEGATIVE for headache, weakness, dizziness or paresthesias  P: NEGATIVE for changes in mood or affect    PROBLEM LIST  ===============  Patient Active Problem List    Diagnosis Date Noted    Class 2 severe obesity with serious comorbidity and body mass index (BMI) of 38.0 to 38.9 in adult, unspecified obesity type (H) 10/05/2022     Priority: Medium    Hidradenitis suppurativa 10/05/2022     Priority: Medium    Borderline personality disorder (H) 10/05/2022     Priority: Medium    ADHD (attention deficit hyperactivity disorder) 10/05/2022     Priority: Medium    Encounter for triage in pregnant patient 2021     Priority: Medium     delivery delivered 2016     Priority: Medium    Depression with anxiety 10/20/2011     Priority: Medium       HISTORIES  ==============  ALLERGIES:      Allergies   Allergen Reactions    Codeine Itching, Hives and Nausea    Ibuprofen Nausea     PAST MEDICAL HISTORY  No past medical history on file.  SOCIAL HISTORY    No family history on file.  OB HISTORY  OB History    Para Term  AB Living   3 1 1 0 0 1   SAB IAB Ectopic Multiple Live Births   0 0 0 0 1      # Outcome Date GA Lbr Roc/2nd Weight Sex Delivery Anes PTL Lv   3 Current            2 Term 16 40w1d 12:20 / 00:52 3.232 kg (7 lb 2 oz) F CS-LTranv EPI N TONE      Complications: Chorioamnionitis      Apgar1: 9  Apgar5: 9   1                 EXAM  ============  There were no vitals taken for this visit.  GENERAL APPEARANCE: healthy, alert and mod distress  ABDOMEN:  soft, nontender, no epigastric pain  SKIN: no suspicious lesions or rashes  NEURO: Denies headache, blurred vision, other vision changes  PSYCH: mentation appears normal. and affect normal/bright  Back: No CVA tenderness      CONTRACTIONS: none   FETAL HEART TONES: continuous EFM- baseline 135 with moderate variability and positive accelerations. No decelerations.  NST: REACTIVE    PELVIC  EXAM: closed/thick/high/soft/ML  BLOOD: no  DISCHARGE: clear and none    LABS: UA, UC, Wet prep, and fFN (collected prior to other swabs and SVE)    DIAGNOSIS  ============  30w0d seen at L&D Triage acute onset lower back pain of unknown etiology  NST: REACTIVE  Fetal Heart rate tracing:category one    PLAN  ============  UA/UC  Wet prep  fFN  Fentanyl 25mcg IV  Tylenol 975mg  Fluid flush  Abd US/ Limited OB US  Await results. If no obvious obstetrical etiology, will direct to ED for further evaluation    MATT Lopez CNM    Total time: 35 minutes with more than majority in direct patient care.

## 2023-11-06 NOTE — ED PROVIDER NOTES
"EMERGENCY DEPARTMENT ENCOUNTER      NAME: Fatmata Catherine  AGE: 28 year old female  YOB: 1995  MRN: [id number]  EVALUATION DATE & TIME: 2023  4:19 PM    PCP: No Ref-Primary, Physician    ED PROVIDER: Anrdew Carter M.D.      Chief Complaint   Patient presents with    Abdominal Pain    Emesis    Back Pain         FINAL IMPRESSION:  1. Upper back pain on right side    2. Right upper quadrant pain    3. Nausea          ED COURSE & MEDICAL DECISION MAKING:    Pertinent Labs & Imaging studies reviewed below.  All EKGs below represent my independent interpretation.   ED Course as of 23 2305   Mon 2023   1640 Patient is a 28-year-old woman,  at 30 weeks, here with severe right upper back and right upper quadrant pain with nausea and vomiting that began suddenly about 4 hours ago.  Was seen by OB prior to this, ultrasound of the abdomen did not show evidence of hydronephrosis, urinalysis did not show evidence of hematuria or UTI, and fetal ultrasound was reassuring.  Negative fibronectin test.  Not thought to be in labor.  Sent here for evaluation.  On arrival she has blood pressure of 109/66 with normal temperature and heart rate.  She is uncomfortable, moaning.  She has modest tenderness in the right upper quadrant.  She states that it hurts to breathe.  Plan to give IV fluids, analgesia, antiemetics.  Although ultrasound did not show evidence of cholecystitis, plan to check hepatic enzymes, lipase.  Differential diagnosis includes biliary colic, PE, kidney stone.  Less likely aortic dissection given age and reassuring blood pressure here.  Aortic caliber was also normal on ultrasound.   1704 I spoke to nurse midwife, Rossy Laureano. Workup included reactive NST, baby looks \"perfect.\" Fetal fibronectin negative. No contractions.    1743 Lactic Acid: 0.9   1747 Lipase: 22   1747 Basic metabolic panel(!)  WNL   1747 Bilirubin Direct: 0.28    I updated the patient.  Pain is much " better controlled with Dilaudid.  We discussed risks and benefits of performing CT scan.  I recommended this and she agrees to proceed.  I think it is quite important to rule out PE given no sign of cholecystitis, and no inflammatory changes that would raise suspicion for UTI or pyelonephritis.   1854 CT Chest Pulmonary Embolism w Contrast  1.  Negative CT pulmonary angiogram.   1918 Pain returning, oral oxycodone   2022 US Renal Complete Non-Vascular  1.  Normal kidney ultrasound. No hydronephrosis.   2108 Patient admitted to the resident service for pain management, medical observation       Additional ED Course Timestamps:  4:38 PM I met with the patient, obtained history, performed an initial exam, and discussed options and plan for diagnostics and treatment here in the ED.   6:59 PM I rechecked and updated the patient.    Medical Decision Making    History:  Supplemental history from: Family Member/Significant Other  External Record(s) reviewed: Documented in chart, if applicable.    Work Up:  Chart documentation includes differential considered and any EKGs or imaging independently interpreted by provider, where specified.  In additional to work up documented, I considered the following work up: Documented in chart, if applicable.    External consultation:  Discussion of management with another provider: Documented in chart, if applicable    Complicating factors:  Care impacted by chronic illness: N/A  Care affected by social determinants of health: N/A    Disposition considerations: Admit.      At the conclusion of the encounter I discussed the results of all of the tests and the disposition. The questions were answered. The patient or family acknowledged understanding and was agreeable with the care plan.       MEDICATIONS GIVEN IN THE EMERGENCY:  Medications - No data to display      NEW PRESCRIPTIONS STARTED AT TODAY'S ER VISIT  New Prescriptions    No medications on file       "    =================================================================    HPI    Patient information was obtained from: patient     Use of : N/A      Fatmata Catherine is a 28 year old female with a pertinent history of 2 c-sections and obesity who presents to this ED for evaluation of abdominal pain.    The patient  presenting today at 30w0d with nausea and constant, 9/10 lower back pain that now radiates to her right side and abdomen. This began suddenly at 11:30 AM and she was being seen by OB here earlier this afternoon. She was given fentanyl but states that this has never fully relieved her pain. Deep breaths make her pain worse. She is unsure if the pain makes her short of breath but emphasizes that it is hard to distinguish any symptoms other than the pain. The only time she has ever felt pain similar to this was while in delivery with her first child. She was given a lidocaine patch but thought that it was making her nauseous so she took it off during the exam. She denies history of kidney stones.     She denies leg swelling, leg pain, or any other complaints at this time.       Chart Review:  The patient was seen earlier this afternoon at [facility] for evaluation of this pain. She was given fentanyl 25 mcg, Tylenol 975 mg, and they performed an abdominal ultrasound. They ruled out gall bladder problems or bladder infections.             VITALS:  /66   Pulse 76   Temp 97.5  F (36.4  C) (Temporal)   Resp 18   Ht 1.499 m (4' 11\")   Wt 90.7 kg (200 lb)   SpO2 96%   BMI 40.40 kg/m      PHYSICAL EXAM    Constitutional: Well developed, well nourished. Uncomfortable appearing, moaning in pain.  HENT: Normocephalic, atraumatic, mucous membranes moist, nose normal. Neck- Supple, gross ROM intact.   Eyes: Pupils mid-range, conjunctiva without injection, no discharge.   Respiratory: Clear to auscultation bilaterally, no respiratory distress, no wheezing, speaks full sentences " easily. No cough.  Cardiovascular: Normal heart rate, regular rhythm, no murmurs.   GI: Soft, no masses. Mild tenderness in RUQ. Gravid abdomen consistent with 3rd trimester.  Musculoskeletal: Moving all 4 extremities intentionally and without pain. No obvious deformity.  Skin: Warm, dry, no rash.  Neurologic: Alert & oriented x 3, cranial nerves grossly intact.  Psychiatric: Affect normal, cooperative.      I, Lawrence Orr am serving as a scribe to document services personally performed by Dr. Andrew Carter based on my observation and the provider's statements to me. I, Andrew Carter MD attest that Lawrence Orr is acting in a scribe capacity, has observed my performance of the services and has documented them in accordance with my direction.    Andrew Carter M.D.  Emergency Medicine  [facility] EMERGENCY ROOM  [address] [facility] [address]  [phone]  Dept: [phone]       Andrew Carter MD  11/06/23 [id number]

## 2023-11-06 NOTE — PROGRESS NOTES
Outpatient/Triage Note:    Patient Name:  Fatmata Catherine  :      1995  MRN:      [id number]      Assessment:   @ 30w0d here for evaluation of acute onset low back pain unknown etiology.  Negative fFN  Cervix: closed thick high soft/ML     Plan:   -Discharge to ED for further evaluation of severe low back pain with acute onset.    Subjective:  Fatmata Catherine is a 28 year old  at 30 weeks, who presented to [facility] for evaluation of acute onset low back pain. Denies leaking of fluid, bleeding, abdominal pain, contractions, or changes in fetal movement. She has been cleared from an OB standpoint. She has received 2 doses of fentanyl 25 mcg IV and 975 of tylenol and a lidocaine patch. The fentanyl did help her pain for about 1 hour before it came back.    Objective:  Vital signs: WNL  Wet Prep: neg  Fetal Fibronectin: negtive  OB and abdominal US: negative aside from mild hepatic steatosis      Results:  Recent Results (from the past 168 hour(s))   UA with Microscopic   Result Value Ref Range Status    Color Urine Light Yellow Colorless, Straw, Light Yellow, Yellow Final    Appearance Urine Clear Clear Final    Glucose Urine Negative Negative mg/dL Final    Bilirubin Urine Negative Negative Final    Ketones Urine Negative Negative mg/dL Final    Specific Gravity Urine 1.015 1.001 - 1.030 Final    Blood Urine Negative Negative Final    pH Urine 6.0 5.0 - 7.0 Final    Protein Albumin Urine Negative Negative mg/dL Final    Urobilinogen Urine <2.0 <2.0 mg/dL Final    Nitrite Urine Negative Negative Final    Leukocyte Esterase Urine Negative Negative Final    Bacteria Urine Moderate (A) None Seen /HPF Final    Mucus Urine Present (A) None Seen /LPF Final    RBC Urine 0 <=2 /HPF Final    WBC Urine 1 <=5 /HPF Final    Squamous Epithelials Urine 1 <=1 /HPF Final     *Note: Due to a large number of results and/or encounters for the requested time period, some results have not been displayed. A complete  set of results can be found in Results Review.         Provider: MATT Lopez CNM

## 2023-11-06 NOTE — ED TRIAGE NOTES
Pt presents to the ED with c/o worsening abdominal tightness, mid-upper back pain, and emesis that started suddenly this afternoon. Pt sent down from OB. Pt roughly 30 weeks pregnant.         
Clear bilaterally, pupils equal, round and reactive to light.

## 2023-11-07 VITALS
OXYGEN SATURATION: 98 % | WEIGHT: 200 LBS | HEART RATE: 91 BPM | SYSTOLIC BLOOD PRESSURE: 108 MMHG | BODY MASS INDEX: 40.32 KG/M2 | DIASTOLIC BLOOD PRESSURE: 79 MMHG | HEIGHT: 59 IN | RESPIRATION RATE: 18 BRPM | TEMPERATURE: 97.8 F

## 2023-11-07 LAB
ERYTHROCYTE [DISTWIDTH] IN BLOOD BY AUTOMATED COUNT: 13.9 % (ref 10–15)
HCT VFR BLD AUTO: 30.6 % (ref 35–47)
HGB BLD-MCNC: 9.4 G/DL (ref 11.7–15.7)
MCH RBC QN AUTO: 27.2 PG (ref 26.5–33)
MCHC RBC AUTO-ENTMCNC: 30.7 G/DL (ref 31.5–36.5)
MCV RBC AUTO: 88 FL (ref 78–100)
PLATELET # BLD AUTO: 339 10E3/UL (ref 150–450)
RBC # BLD AUTO: 3.46 10E6/UL (ref 3.8–5.2)
SARS-COV-2 RNA RESP QL NAA+PROBE: NEGATIVE
WBC # BLD AUTO: 7.1 10E3/UL (ref 4–11)

## 2023-11-07 PROCEDURE — 250N000013 HC RX MED GY IP 250 OP 250 PS 637

## 2023-11-07 PROCEDURE — G0378 HOSPITAL OBSERVATION PER HR: HCPCS

## 2023-11-07 PROCEDURE — 85027 COMPLETE CBC AUTOMATED: CPT

## 2023-11-07 PROCEDURE — 99222 1ST HOSP IP/OBS MODERATE 55: CPT | Mod: GC

## 2023-11-07 PROCEDURE — 36415 COLL VENOUS BLD VENIPUNCTURE: CPT

## 2023-11-07 PROCEDURE — 87635 SARS-COV-2 COVID-19 AMP PRB: CPT | Performed by: STUDENT IN AN ORGANIZED HEALTH CARE EDUCATION/TRAINING PROGRAM

## 2023-11-07 RX ORDER — HYDROXYZINE HYDROCHLORIDE 10 MG/1
10 TABLET, FILM COATED ORAL 3 TIMES DAILY PRN
Qty: 10 TABLET | Refills: 0 | Status: ON HOLD | OUTPATIENT
Start: 2023-11-07 | End: 2024-01-12

## 2023-11-07 RX ORDER — ACETAMINOPHEN 325 MG/1
975 TABLET ORAL EVERY 8 HOURS
Status: DISCONTINUED | OUTPATIENT
Start: 2023-11-07 | End: 2023-11-07 | Stop reason: HOSPADM

## 2023-11-07 RX ADMIN — ACETAMINOPHEN 975 MG: 325 TABLET ORAL at 09:59

## 2023-11-07 RX ADMIN — BUPROPION HYDROCHLORIDE 150 MG: 150 TABLET, FILM COATED, EXTENDED RELEASE ORAL at 07:58

## 2023-11-07 RX ADMIN — FAMOTIDINE 40 MG: 20 TABLET ORAL at 07:59

## 2023-11-07 ASSESSMENT — ACTIVITIES OF DAILY LIVING (ADL)
ADLS_ACUITY_SCORE: 35

## 2023-11-07 NOTE — H&P
[facility]    History and Physical - Hospitalist Service       Date of Admission:  2023    Assessment & Plan      Fatmata Catherine is a 28 year old  30w0d female admitted on 2023. She has a history of depression, current pregnancy, and 2 C-sections and is admitted for intractable upper abdominal pain and right sided back pain.     Intractable pain.  Likely costochondritis  Nausea and vomiting  Pregnancy  Patient is currently 30w0d pregnant and presented with intractable upper abdominal and right-sided back pain that began at 11:30 this morning. Gradually worsened and became more constant throughout the day.  She received fentanyl on arrival to the ED that mildly improved, but did not completely relieve her pain.  Oral oxycodone was then tried that did not improve her pain.  Patient was then given IV Dilaudid which provided adequate pain relief.  All labs and fetal monitoring were within normal limits.  Negative fetal fibronectin.  Normotensive  - Dilaudid 0.5 mg IV every 4 hours as needed  - Zofran as needed for nausea  - OB consult placed to [place] [facility], appreciate recs and cares    Depression  Patient currently takes Wellbutrin and citalopram at home for depression.  Is happy with her current regimen  - Continue PTA Wellbutrin 300 mg in the morning and 150 mg at night  - Continue PTA citalopram 40 mg daily     Observation Goals: -diagnostic tests and consults completed and resulted, -vital signs normal or at patient baseline, -adequate pain control on oral analgesics, Nurse to notify provider when observation goals have been met and patient is ready for discharge.  Diet: Regular Diet Adult    DVT Prophylaxis: Low Risk/Ambulatory with no VTE prophylaxis indicated  Christie Catheter: Not present  Fluids: PO  Lines: None     Cardiac Monitoring: None  Code Status: Full Code      Clinically Significant Risk Factors Present on Admission           # Hypoalbuminemia:  Lowest albumin = 3 g/dL at 2023  5:22 PM, will monitor as appropriate                     Disposition Plan      Expected Discharge Date: 2023                The patient's care was discussed with the overnight physician, Dr. Lenny Mcguire MD.  Will be seen by Attending Physician, Dr. Zain Sanchez DO in the morning.    Eric Edwards DO PGY-2  Hospitalist Service  [facility]  Securely message with [employer] (more info)  Text page via [facility] Paging/Directory   ______________________________________________________________________    Chief Complaint   Intractable right upper quadrant and right mid back pain    History is obtained from the patient    History of Present Illness   Fatmata Catherine is a 28 year old  female at 30w0d who has a history of depression and current pregnancy and is admitted for intractable right upper quadrant and right mid back pain that has been present since 1130 this morning.  Reports the pain gradually worsened and became more constant throughout the day prompting her to go to the emergency room.  She has never had any pain like this with her prior pregnancies earlier in this pregnancy.  He is unsure whether or not her  being out of town is a contributing factor.  Was initially evaluated on the L&D floor and fetal ultrasound and toco revealed a category 1 strip and no fetal abnormalities.  Pain was refractory to oral oxycodone and fentanyl, but IV Dilaudid provided good relief.  Patient was comfortable during my encounter and rated her pain as 2/10.    Past Medical History    History reviewed. No pertinent past medical history.    Past Surgical History   History reviewed. No pertinent surgical history.    Prior to Admission Medications   Prior to Admission Medications   Prescriptions Last Dose Informant Patient Reported? Taking?   buPROPion (WELLBUTRIN SR) 150 MG 12 hr tablet 2023  Yes Yes   Sig: Take 150 mg by mouth every morning Take 150 mg  tablet along with 300 mg tablet for total daily dose of 450 mg.   buPROPion (WELLBUTRIN XL) 300 MG 24 hr tablet 11/5/2023  Yes Yes   Sig: Take 300 mg by mouth every morning Take 300 mg tablet along with 150 mg tablet for total daily dose of 450 mg.   citalopram (CELEXA) 40 MG tablet 11/5/2023  Yes Yes   Sig: Take 40 mg by mouth daily   famotidine (PEPCID) 40 MG tablet 11/5/2023  Yes Yes   Sig: Take 40 mg by mouth 2 times daily      Facility-Administered Medications: None           Physical Exam   Vital Signs: Temp: 97.5  F (36.4  C) Temp src: Temporal BP: 119/67 Pulse: 78   Resp: 18 SpO2: 95 %      Weight: 200 lbs 0 oz    General Appearance: And oriented x3.  No apparent distress.  Eyes: EOMI intact.  PERRL.  Conjunctiva normal  HEENT: No obvious ear, nose, or throat abnormalities.  Head normocephalic and atraumatic  Respiratory: Lungs clear to auscultation bilaterally.  No rales, crackles, or wheezes.  Cardiovascular: Heart RRR without any murmurs, rubs, or gallops.  No peripheral edema  GI: Gravid abdomen.  Soft uterus to palpation.  No pain with palpation of uterus.  No pain on palpation of right upper quadrant, although patient states this is where one of the spots her pain was located prior to analgesia administration.  Lymph/Hematologic: No lymphadenopathy  Skin: No obvious rashes or lesions  Musculoskeletal: Nontender to palpation over right lower ribs, but patient states this is where her pain was located prior to receiving analgesia.  Full painless ROM of all extremities  Neurologic: No focal deficits.  Cranial nerves II-XII intact  Psychiatric: Appropriate mood and pleasant affect       Data     I have personally reviewed the following data over the past 24 hrs:    9.7  \   9.0 (L)   / 338     136 104 5.6 (L) /  108 (H)   3.7 22 0.50 (L) \     ALT: 18 AST: 28 AP: 109 (H) TBILI: 0.4   ALB: 3.0 (L) TOT PROTEIN: 6.0 (L) LIPASE: 22     Procal: N/A CRP: N/A Lactic Acid: 0.9       INR:  N/A PTT:  N/A   D-dimer:   1.15 (H) Fibrinogen:  N/A       Imaging results reviewed over the past 24 hrs:   Recent Results (from the past 24 hour(s))   US OB > 14 Weeks    Narrative    EXAM: US OB > 14 WEEKS  LOCATION: [facility]  DATE: 11/6/2023    INDICATION: 30 week gestation hx 2 c section, acute onset low back pain. Also ordering an abdominal US.  COMPARISON: None.  TECHNIQUE: Routine.    FINDINGS: Single intrauterine gestation, cephalic presentation. Placenta is located anterior. Amniotic fluid is normal. Uterus is normal. Maternal adnexa (right and left ovaries) show no abnormalities. Normal fetal movement.    FETAL ANOMALY SCREEN: Survey of the fetal anatomy is unremarkable. Specifically, normal views of the four-chamber heart, stomach, kidneys, and urinary bladder.     Biometry was not assessed on the current examination.      Impression    IMPRESSION:    1.  Single living intrauterine gestation without acute abnormality.  2.  Based on prior dating, composite age of 30 weeks 0 days with EDC 01/15/2024.     US Abdomen Complete    Narrative    EXAM: US ABDOMEN COMPLETE  LOCATION: [facility]  DATE: 11/6/2023    INDICATION: 30 weeks pregnant with acute onset low back pain. WIll also order limited OB US.  COMPARISON: No prior abdominal imaging.  TECHNIQUE: Complete abdominal ultrasound.    FINDINGS:    GALLBLADDER: Normal. No gallstones, wall thickening, or pericholecystic fluid. Negative sonographic Atkinson's sign.    BILE DUCTS: No biliary dilatation. The common duct measures 5 mm.    LIVER: Increased echogenicity from diffuse fatty infiltration. No focal mass.    RIGHT KIDNEY: Normal size. Normal echogenicity with no hydronephrosis or mass.     LEFT KIDNEY: Normal size. Normal echogenicity with no hydronephrosis or mass.     SPLEEN: Normal.    PANCREAS: The visualized portions are normal.    AORTA: Normal in caliber.     IVC: Normal where visualized.    No ascites.      Impression     IMPRESSION:    Mild hepatic steatosis. Otherwise normal abdominal ultrasound.       CT Chest Pulmonary Embolism w Contrast    Narrative    EXAM: CT CHEST PULMONARY EMBOLISM W CONTRAST  LOCATION: [facility]  DATE: 11/6/2023    INDICATION: sudden severe R back and RUQ pain, pleuritic. 30 weeks pregnant, + d dimer, eval for PE  COMPARISON: None.  TECHNIQUE: CT chest pulmonary angiogram during arterial phase injection of IV contrast. Multiplanar reformats and MIP reconstructions were performed. Dose reduction techniques were used.   CONTRAST: 90ml Isovue 370    FINDINGS:  ANGIOGRAM CHEST: Pulmonary arteries are normal caliber and negative for pulmonary emboli. Thoracic aorta is negative for dissection. No CT evidence of right heart strain.    LUNGS AND PLEURA: Normal.    MEDIASTINUM/AXILLAE: Normal.    CORONARY ARTERY CALCIFICATION: None.    UPPER ABDOMEN: Normal.    MUSCULOSKELETAL: Normal.      Impression    IMPRESSION:  1.  Negative CT pulmonary angiogram.   US Renal Complete Non-Vascular    Narrative    EXAM: US RENAL COMPLETE NON-VASCULAR  LOCATION: [facility]  DATE: 11/6/2023    INDICATION: persistent R flank pain, eval for progression of hydro  COMPARISON: Ultrasound 11/06/2023 at 1524 hours  TECHNIQUE: Routine Bilateral Renal and Bladder Ultrasound.    FINDINGS:    RIGHT KIDNEY: 9.7 x 6.1 x 5.3 cm. Normal without hydronephrosis or masses.     LEFT KIDNEY: 1.9 x 5.3 x 4.5 cm. Normal without hydronephrosis or masses.     BLADDER: Normal.      Impression    IMPRESSION:  1.  Normal kidney ultrasound. No hydronephrosis.      airway patent/breath sounds equal/normal/good air movement/respirations non-labored

## 2023-11-07 NOTE — PHARMACY-ADMISSION MEDICATION HISTORY
Pharmacist Admission Medication History    Admission medication history is complete. The information provided in this note is only as accurate as the sources available at the time of the update.    Information Source(s): Patient, Clinic records, and [facility] via in-person    Pertinent Information: None    Changes made to PTA medication list:  Added: famotidine  Deleted: None  Changed: bupropion dose increased    Medication History Completed By: Jackelyn Simmons, PharmD, BCPS 11/6/2023 8:30 PM    PTA Med List   Medication Sig Last Dose    buPROPion (WELLBUTRIN SR) 150 MG 12 hr tablet Take 150 mg by mouth every morning Take 150 mg tablet along with 300 mg tablet for total daily dose of 450 mg. 11/5/2023    buPROPion (WELLBUTRIN XL) 300 MG 24 hr tablet Take 300 mg by mouth every morning Take 300 mg tablet along with 150 mg tablet for total daily dose of 450 mg. 11/5/2023    citalopram (CELEXA) 40 MG tablet Take 40 mg by mouth daily 11/5/2023    famotidine (PEPCID) 40 MG tablet Take 40 mg by mouth 2 times daily 11/5/2023

## 2023-11-07 NOTE — PLAN OF CARE
Goal Outcome Evaluation:      Problem: Adult Inpatient Plan of Care  Goal: Optimal Comfort and Wellbeing  Outcome: Progressing     PRIMARY DIAGNOSIS: Nausea  OUTPATIENT/OBSERVATION GOALS TO BE MET BEFORE DISCHARGE:  ADLs back to baseline: Yes    Activity and level of assistance: Ambulating independently.    Pain status: Pain free.    Return to near baseline physical activity: Yes     Discharge Planner Nurse   Safe discharge environment identified: Yes  Barriers to discharge: No       Entered by: Dora Reyes, RN 11/07/2023 12:17 AM     Please review provider order for any additional goals.   Nurse to notify provider when observation goals have been met and patient is ready for discharge.    Pt AxO x4, no acute changes this shift. Pt reports nausea has improved, denies at this time. Pt denies pain. No acute distress noted w/assessment, respirations even and unlabored. No further concerns as of present.

## 2023-11-08 NOTE — DISCHARGE SUMMARY
[facility]  Discharge Summary - Medicine & Pediatrics       Date of Admission:  2023  Date of Discharge:  2023 11:18 AM  Discharging Provider: Mark Best MD, Zain Sanchez MD  Discharge Service: Hospitalist Service    Discharge Diagnoses   Muscle Strain   Intractable Pain   Nausea and Vomiting  Pregnancy     Clinically Significant Risk Factors          Follow-ups Needed After Discharge   Follow-up Appointments     Follow-up and recommended labs and tests       Follow up with [facility] within a week for post hospital   follow up            Unresulted Labs Ordered in the Past 30 Days of this Admission       No orders found from 10/7/2023 to 2023.          Discharge Disposition   Discharged to home  Condition at discharge: Stable    Hospital Course   Fatmata Catherine is a 28 year old  30w0d female admitted on 2023. She has a history of depression, current pregnancy, and 2 C-sections and is admitted for intractable upper abdominal pain and right sided back pain.      Intractable pain.  Likely muscle strain  Nausea and vomiting  Pregnancy  Patient states that she has been moving boxes for the past couple of days and started to experience intractable pain along the upper abdomen and right-sided back pain that started on 11:30 in the morning. This worsened throughout the day prior to visiting the ED. Pain resolved with IV dilaudid. Patient instructed to rest and use tylenol as needed for pain. All labs and fetal monitoring were normal during visit to hospital. Negative fetal fibronectin. Normotensive.   -Discharged with tylenol and hydroxizine for pain      Depression  Patient currently takes Wellbutrin and citalopram at home for depression.  Is happy with her current regimen  - Continue PTA Wellbutrin 300 mg in the morning and 150 mg at night  - Continue PTA citalopram 40 mg daily     Consultations This Hospital Stay   OB GYN IP CONSULT    Code Status  "  Prior       The patient was discussed with Dr. Daniel GOMEZ MD  [facility] EMERGENCY ROOM  [address] [address]  Phone: [phone]  Fax: [phone]  ______________________________________________________________________    Physical Exam   Vital Signs:                    Weight: 200 lbs 0 oz  /79   Pulse 91   Temp 97.8  F (36.6  C) (Oral)   Resp 18   Ht 1.499 m (4' 11\")   Wt 90.7 kg (200 lb)   SpO2 98%   BMI 40.40 kg/m        PHYSICAL EXAM:  GENERAL: Awake, alert, sitting upright in chair. No acute distress.   HEENT: No scleral icterus or conjunctival injection. Oral cavity moist and pink with no ulcers, exudate, or thrush present. No cervical or supraclavicular lymphadenopathy.  SKIN: Warm and dry. No bruises, rashes, or skin lesions.  LUNGS: Normal work of breathing with no use of accessory muscles. Clear breath sounds in all lung fields bilaterally with no wheezes or crackles appreciated.  CARDIAC: RRR. Normal S1 and S2. No murmurs, clicks, or rubs appreciated. No JVD. No peripheral edema.  ABDOMEN: Non-distended. Soft and non-tender throughout with no masses or organomegaly. Slight soreness along the bottom right rib extending into the back. Spine symmetric,  NEUROLOGIC: Alert and oriented. Sensation to light touch involving upper and lower extremities intact bilaterally.   EXTREMITIES: No gross deformity or peripheral edema. Appear well-perfused.         Primary Care Physician   Physician No Ref-Primary    Discharge Orders      Reason for your hospital stay    You were seen in the hospital for work-up notable side pain. This is likely a muscle strain. This is common in pregnancy with a lot of movement. In pregnancy our muscles are much more loose and lax and it is easy to stress a muscle.  I am sending you out with hydroxizine, this can help with anxiety and sleep. Can take 3 times daily as needed. I sent this to Isa " [facility].     Follow-up and recommended labs and tests     Follow up with [facility] within a week for post hospital follow up     Activity    Your activity upon discharge: activity as tolerated     Diet    Follow this diet upon discharge: Orders Placed This Encounter      Regular Diet Adult       Significant Results and Procedures   Most Recent 3 CBC's:  Recent Labs   Lab Test 11/07/23  0634 11/06/23  1722   WBC 7.1 9.7   HGB 9.4* 9.0*   MCV 88 87    338     Most Recent 3 BMP's:  Recent Labs   Lab Test 11/06/23  1722 06/08/22  1335 07/23/16  1049    141  --    POTASSIUM 3.7 4.0  --    CHLORIDE 104 106  --    CO2 22 26  --    BUN 5.6* 6*  --    CR 0.50* 0.76 0.59*   ANIONGAP 10 9  --    HIREN 8.6 9.0  --    * 88  --    ,   Results for orders placed or performed during the hospital encounter of 11/06/23   CT Chest Pulmonary Embolism w Contrast    Narrative    EXAM: CT CHEST PULMONARY EMBOLISM W CONTRAST  LOCATION: [facility]  DATE: 11/6/2023    INDICATION: sudden severe R back and RUQ pain, pleuritic. 30 weeks pregnant, + d dimer, eval for PE  COMPARISON: None.  TECHNIQUE: CT chest pulmonary angiogram during arterial phase injection of IV contrast. Multiplanar reformats and MIP reconstructions were performed. Dose reduction techniques were used.   CONTRAST: 90ml Isovue 370    FINDINGS:  ANGIOGRAM CHEST: Pulmonary arteries are normal caliber and negative for pulmonary emboli. Thoracic aorta is negative for dissection. No CT evidence of right heart strain.    LUNGS AND PLEURA: Normal.    MEDIASTINUM/AXILLAE: Normal.    CORONARY ARTERY CALCIFICATION: None.    UPPER ABDOMEN: Normal.    MUSCULOSKELETAL: Normal.      Impression    IMPRESSION:  1.  Negative CT pulmonary angiogram.   US Renal Complete Non-Vascular    Narrative    EXAM: US RENAL COMPLETE NON-VASCULAR  LOCATION: [facility]  DATE: 11/6/2023    INDICATION: persistent R  flank pain, eval for progression of hydro  COMPARISON: Ultrasound 11/06/2023 at 1524 hours  TECHNIQUE: Routine Bilateral Renal and Bladder Ultrasound.    FINDINGS:    RIGHT KIDNEY: 9.7 x 6.1 x 5.3 cm. Normal without hydronephrosis or masses.     LEFT KIDNEY: 1.9 x 5.3 x 4.5 cm. Normal without hydronephrosis or masses.     BLADDER: Normal.      Impression    IMPRESSION:  1.  Normal kidney ultrasound. No hydronephrosis.       Discharge Medications   Discharge Medication List as of 11/7/2023 11:09 AM        START taking these medications    Details   hydrOXYzine (ATARAX) 10 MG tablet Take 1 tablet (10 mg) by mouth 3 times daily as needed for anxiety, Disp-10 tablet, R-0, E-Prescribe           CONTINUE these medications which have NOT CHANGED    Details   buPROPion (WELLBUTRIN SR) 150 MG 12 hr tablet Take 150 mg by mouth every morning Take 150 mg tablet along with 300 mg tablet for total daily dose of 450 mg., Historical      buPROPion (WELLBUTRIN XL) 300 MG 24 hr tablet Take 300 mg by mouth every morning Take 300 mg tablet along with 150 mg tablet for total daily dose of 450 mg., Historical      citalopram (CELEXA) 40 MG tablet Take 40 mg by mouth daily, Historical      famotidine (PEPCID) 40 MG tablet Take 40 mg by mouth 2 times daily, Historical           Allergies   Allergies   Allergen Reactions    Codeine Itching, Hives and Nausea    Ibuprofen Nausea

## 2024-01-11 ENCOUNTER — ANESTHESIA EVENT (OUTPATIENT)
Dept: SURGERY | Facility: CLINIC | Age: 29
End: 2024-01-11
Payer: COMMERCIAL

## 2024-01-12 ENCOUNTER — HOSPITAL ENCOUNTER (INPATIENT)
Facility: CLINIC | Age: 29
LOS: 2 days | Discharge: HOME | End: 2024-01-14
Attending: OBSTETRICS & GYNECOLOGY | Admitting: OBSTETRICS & GYNECOLOGY
Payer: COMMERCIAL

## 2024-01-12 ENCOUNTER — ANESTHESIA (OUTPATIENT)
Dept: SURGERY | Facility: CLINIC | Age: 29
End: 2024-01-12
Payer: COMMERCIAL

## 2024-01-12 DIAGNOSIS — Z98.891 S/P CESAREAN SECTION: ICD-10-CM

## 2024-01-12 LAB
ABO/RH(D): NORMAL
ANTIBODY SCREEN: NEGATIVE
CREAT SERPL-MCNC: 0.61 MG/DL (ref 0.51–0.95)
EGFRCR SERPLBLD CKD-EPI 2021: >90 ML/MIN/1.73M2
ERYTHROCYTE [DISTWIDTH] IN BLOOD BY AUTOMATED COUNT: 17.2 % (ref 10–15)
HCT VFR BLD AUTO: 28.4 % (ref 35–47)
HGB BLD-MCNC: 8.4 G/DL (ref 11.7–15.7)
HGB BLD-MCNC: 8.6 G/DL (ref 11.7–15.7)
MCH RBC QN AUTO: 24.4 PG (ref 26.5–33)
MCHC RBC AUTO-ENTMCNC: 30.3 G/DL (ref 31.5–36.5)
MCV RBC AUTO: 81 FL (ref 78–100)
PLATELET # BLD AUTO: 431 10E3/UL (ref 150–450)
RBC # BLD AUTO: 3.52 10E6/UL (ref 3.8–5.2)
SPECIMEN EXPIRATION DATE: NORMAL
T PALLIDUM AB SER QL: NONREACTIVE
WBC # BLD AUTO: 6.4 10E3/UL (ref 4–11)

## 2024-01-12 PROCEDURE — 86780 TREPONEMA PALLIDUM: CPT | Performed by: OBSTETRICS & GYNECOLOGY

## 2024-01-12 PROCEDURE — 250N000009 HC RX 250: Performed by: NURSE ANESTHETIST, CERTIFIED REGISTERED

## 2024-01-12 PROCEDURE — 250N000011 HC RX IP 250 OP 636: Performed by: OBSTETRICS & GYNECOLOGY

## 2024-01-12 PROCEDURE — 88302 TISSUE EXAM BY PATHOLOGIST: CPT | Mod: 26 | Performed by: PATHOLOGY

## 2024-01-12 PROCEDURE — 360N000076 HC SURGERY LEVEL 3, PER MIN: Performed by: OBSTETRICS & GYNECOLOGY

## 2024-01-12 PROCEDURE — 0UT70ZZ RESECTION OF BILATERAL FALLOPIAN TUBES, OPEN APPROACH: ICD-10-PCS | Performed by: OBSTETRICS & GYNECOLOGY

## 2024-01-12 PROCEDURE — 370N000017 HC ANESTHESIA TECHNICAL FEE, PER MIN: Performed by: OBSTETRICS & GYNECOLOGY

## 2024-01-12 PROCEDURE — 999N000249 HC STATISTIC C-SECTION ON UNIT

## 2024-01-12 PROCEDURE — 88302 TISSUE EXAM BY PATHOLOGIST: CPT | Mod: TC | Performed by: OBSTETRICS & GYNECOLOGY

## 2024-01-12 PROCEDURE — 999N000141 HC STATISTIC PRE-PROCEDURE NURSING ASSESSMENT: Performed by: OBSTETRICS & GYNECOLOGY

## 2024-01-12 PROCEDURE — 258N000003 HC RX IP 258 OP 636: Performed by: OBSTETRICS & GYNECOLOGY

## 2024-01-12 PROCEDURE — 82565 ASSAY OF CREATININE: CPT | Performed by: OBSTETRICS & GYNECOLOGY

## 2024-01-12 PROCEDURE — C9290 INJ, BUPIVACAINE LIPOSOME: HCPCS | Performed by: ANESTHESIOLOGY

## 2024-01-12 PROCEDURE — 250N000013 HC RX MED GY IP 250 OP 250 PS 637: Performed by: OBSTETRICS & GYNECOLOGY

## 2024-01-12 PROCEDURE — 250N000013 HC RX MED GY IP 250 OP 250 PS 637: Performed by: ADVANCED PRACTICE MIDWIFE

## 2024-01-12 PROCEDURE — 250N000011 HC RX IP 250 OP 636: Performed by: NURSE ANESTHETIST, CERTIFIED REGISTERED

## 2024-01-12 PROCEDURE — 85027 COMPLETE CBC AUTOMATED: CPT | Performed by: OBSTETRICS & GYNECOLOGY

## 2024-01-12 PROCEDURE — 120N000001 HC R&B MED SURG/OB

## 2024-01-12 PROCEDURE — 258N000003 HC RX IP 258 OP 636: Performed by: NURSE ANESTHETIST, CERTIFIED REGISTERED

## 2024-01-12 PROCEDURE — 86900 BLOOD TYPING SEROLOGIC ABO: CPT | Performed by: OBSTETRICS & GYNECOLOGY

## 2024-01-12 PROCEDURE — 250N000011 HC RX IP 250 OP 636: Performed by: ANESTHESIOLOGY

## 2024-01-12 PROCEDURE — 36415 COLL VENOUS BLD VENIPUNCTURE: CPT | Performed by: OBSTETRICS & GYNECOLOGY

## 2024-01-12 PROCEDURE — 85018 HEMOGLOBIN: CPT | Performed by: OBSTETRICS & GYNECOLOGY

## 2024-01-12 PROCEDURE — 272N000001 HC OR GENERAL SUPPLY STERILE: Performed by: OBSTETRICS & GYNECOLOGY

## 2024-01-12 RX ORDER — DEXTROSE, SODIUM CHLORIDE, SODIUM LACTATE, POTASSIUM CHLORIDE, AND CALCIUM CHLORIDE 5; .6; .31; .03; .02 G/100ML; G/100ML; G/100ML; G/100ML; G/100ML
INJECTION, SOLUTION INTRAVENOUS CONTINUOUS
Status: DISCONTINUED | OUTPATIENT
Start: 2024-01-12 | End: 2024-01-14 | Stop reason: HOSPADM

## 2024-01-12 RX ORDER — NALOXONE HYDROCHLORIDE 0.4 MG/ML
0.2 INJECTION, SOLUTION INTRAMUSCULAR; INTRAVENOUS; SUBCUTANEOUS
Status: DISCONTINUED | OUTPATIENT
Start: 2024-01-12 | End: 2024-01-14 | Stop reason: HOSPADM

## 2024-01-12 RX ORDER — LIDOCAINE 40 MG/G
CREAM TOPICAL
Status: DISCONTINUED | OUTPATIENT
Start: 2024-01-12 | End: 2024-01-14 | Stop reason: HOSPADM

## 2024-01-12 RX ORDER — ONDANSETRON 4 MG/1
4 TABLET, ORALLY DISINTEGRATING ORAL EVERY 6 HOURS PRN
Status: DISCONTINUED | OUTPATIENT
Start: 2024-01-12 | End: 2024-01-14 | Stop reason: HOSPADM

## 2024-01-12 RX ORDER — MISOPROSTOL 200 UG/1
800 TABLET ORAL
Status: DISCONTINUED | OUTPATIENT
Start: 2024-01-12 | End: 2024-01-14 | Stop reason: HOSPADM

## 2024-01-12 RX ORDER — OXYTOCIN 10 [USP'U]/ML
10 INJECTION, SOLUTION INTRAMUSCULAR; INTRAVENOUS
Status: DISCONTINUED | OUTPATIENT
Start: 2024-01-12 | End: 2024-01-14 | Stop reason: HOSPADM

## 2024-01-12 RX ORDER — DIPHENHYDRAMINE HYDROCHLORIDE 50 MG/ML
25 INJECTION INTRAMUSCULAR; INTRAVENOUS EVERY 6 HOURS PRN
Status: DISCONTINUED | OUTPATIENT
Start: 2024-01-12 | End: 2024-01-14 | Stop reason: HOSPADM

## 2024-01-12 RX ORDER — SODIUM CHLORIDE, SODIUM LACTATE, POTASSIUM CHLORIDE, CALCIUM CHLORIDE 600; 310; 30; 20 MG/100ML; MG/100ML; MG/100ML; MG/100ML
INJECTION, SOLUTION INTRAVENOUS CONTINUOUS
Status: DISCONTINUED | OUTPATIENT
Start: 2024-01-12 | End: 2024-01-14 | Stop reason: HOSPADM

## 2024-01-12 RX ORDER — MISOPROSTOL 200 UG/1
400 TABLET ORAL
Status: DISCONTINUED | OUTPATIENT
Start: 2024-01-12 | End: 2024-01-12 | Stop reason: HOSPADM

## 2024-01-12 RX ORDER — OXYTOCIN/0.9 % SODIUM CHLORIDE 30/500 ML
100-340 PLASTIC BAG, INJECTION (ML) INTRAVENOUS CONTINUOUS PRN
Status: DISCONTINUED | OUTPATIENT
Start: 2024-01-12 | End: 2024-01-14 | Stop reason: HOSPADM

## 2024-01-12 RX ORDER — ACETAMINOPHEN 325 MG/1
975 TABLET ORAL EVERY 6 HOURS
Status: DISCONTINUED | OUTPATIENT
Start: 2024-01-12 | End: 2024-01-14 | Stop reason: HOSPADM

## 2024-01-12 RX ORDER — HYDROMORPHONE HCL IN WATER/PF 6 MG/30 ML
0.2 PATIENT CONTROLLED ANALGESIA SYRINGE INTRAVENOUS EVERY 5 MIN PRN
Status: DISCONTINUED | OUTPATIENT
Start: 2024-01-12 | End: 2024-01-14 | Stop reason: HOSPADM

## 2024-01-12 RX ORDER — ONDANSETRON 2 MG/ML
INJECTION INTRAMUSCULAR; INTRAVENOUS PRN
Status: DISCONTINUED | OUTPATIENT
Start: 2024-01-12 | End: 2024-01-12

## 2024-01-12 RX ORDER — METOCLOPRAMIDE 10 MG/1
10 TABLET ORAL EVERY 6 HOURS PRN
Status: DISCONTINUED | OUTPATIENT
Start: 2024-01-12 | End: 2024-01-14 | Stop reason: HOSPADM

## 2024-01-12 RX ORDER — BISACODYL 10 MG
10 SUPPOSITORY, RECTAL RECTAL DAILY PRN
Status: DISCONTINUED | OUTPATIENT
Start: 2024-01-14 | End: 2024-01-14 | Stop reason: HOSPADM

## 2024-01-12 RX ORDER — CEFAZOLIN SODIUM/WATER 2 G/20 ML
SYRINGE (ML) INTRAVENOUS
Status: DISCONTINUED
Start: 2024-01-12 | End: 2024-01-12 | Stop reason: HOSPADM

## 2024-01-12 RX ORDER — LIDOCAINE 40 MG/G
CREAM TOPICAL
Status: DISCONTINUED | OUTPATIENT
Start: 2024-01-12 | End: 2024-01-12 | Stop reason: HOSPADM

## 2024-01-12 RX ORDER — ACETAMINOPHEN 325 MG/1
975 TABLET ORAL ONCE
Status: COMPLETED | OUTPATIENT
Start: 2024-01-12 | End: 2024-01-12

## 2024-01-12 RX ORDER — OXYTOCIN/0.9 % SODIUM CHLORIDE 30/500 ML
PLASTIC BAG, INJECTION (ML) INTRAVENOUS CONTINUOUS PRN
Status: DISCONTINUED | OUTPATIENT
Start: 2024-01-12 | End: 2024-01-12

## 2024-01-12 RX ORDER — TRANEXAMIC ACID 10 MG/ML
1 INJECTION, SOLUTION INTRAVENOUS EVERY 30 MIN PRN
Status: DISCONTINUED | OUTPATIENT
Start: 2024-01-12 | End: 2024-01-12 | Stop reason: HOSPADM

## 2024-01-12 RX ORDER — NALOXONE HYDROCHLORIDE 0.4 MG/ML
0.4 INJECTION, SOLUTION INTRAMUSCULAR; INTRAVENOUS; SUBCUTANEOUS
Status: CANCELLED | OUTPATIENT
Start: 2024-01-12

## 2024-01-12 RX ORDER — CARBOPROST TROMETHAMINE 250 UG/ML
250 INJECTION, SOLUTION INTRAMUSCULAR
Status: DISCONTINUED | OUTPATIENT
Start: 2024-01-12 | End: 2024-01-12 | Stop reason: HOSPADM

## 2024-01-12 RX ORDER — KETOROLAC TROMETHAMINE 30 MG/ML
30 INJECTION, SOLUTION INTRAMUSCULAR; INTRAVENOUS EVERY 6 HOURS
Status: COMPLETED | OUTPATIENT
Start: 2024-01-12 | End: 2024-01-12

## 2024-01-12 RX ORDER — PROCHLORPERAZINE MALEATE 10 MG
10 TABLET ORAL EVERY 6 HOURS PRN
Status: DISCONTINUED | OUTPATIENT
Start: 2024-01-12 | End: 2024-01-14 | Stop reason: HOSPADM

## 2024-01-12 RX ORDER — METHYLERGONOVINE MALEATE 0.2 MG/ML
200 INJECTION INTRAVENOUS
Status: DISCONTINUED | OUTPATIENT
Start: 2024-01-12 | End: 2024-01-14 | Stop reason: HOSPADM

## 2024-01-12 RX ORDER — NALOXONE HYDROCHLORIDE 0.4 MG/ML
0.2 INJECTION, SOLUTION INTRAMUSCULAR; INTRAVENOUS; SUBCUTANEOUS
Status: CANCELLED | OUTPATIENT
Start: 2024-01-12

## 2024-01-12 RX ORDER — TRANEXAMIC ACID 10 MG/ML
1 INJECTION, SOLUTION INTRAVENOUS EVERY 30 MIN PRN
Status: DISCONTINUED | OUTPATIENT
Start: 2024-01-12 | End: 2024-01-14 | Stop reason: HOSPADM

## 2024-01-12 RX ORDER — MISOPROSTOL 200 UG/1
400 TABLET ORAL
Status: DISCONTINUED | OUTPATIENT
Start: 2024-01-12 | End: 2024-01-14 | Stop reason: HOSPADM

## 2024-01-12 RX ORDER — ONDANSETRON 2 MG/ML
4 INJECTION INTRAMUSCULAR; INTRAVENOUS EVERY 30 MIN PRN
Status: DISCONTINUED | OUTPATIENT
Start: 2024-01-12 | End: 2024-01-14 | Stop reason: HOSPADM

## 2024-01-12 RX ORDER — BUPIVACAINE HYDROCHLORIDE 2.5 MG/ML
INJECTION, SOLUTION EPIDURAL; INFILTRATION; INTRACAUDAL
Status: COMPLETED | OUTPATIENT
Start: 2024-01-12 | End: 2024-01-12

## 2024-01-12 RX ORDER — OXYTOCIN/0.9 % SODIUM CHLORIDE 30/500 ML
340 PLASTIC BAG, INJECTION (ML) INTRAVENOUS CONTINUOUS PRN
Status: DISCONTINUED | OUTPATIENT
Start: 2024-01-12 | End: 2024-01-14 | Stop reason: HOSPADM

## 2024-01-12 RX ORDER — AMOXICILLIN 250 MG
2 CAPSULE ORAL 2 TIMES DAILY
Status: DISCONTINUED | OUTPATIENT
Start: 2024-01-12 | End: 2024-01-14 | Stop reason: HOSPADM

## 2024-01-12 RX ORDER — SODIUM CHLORIDE, SODIUM LACTATE, POTASSIUM CHLORIDE, CALCIUM CHLORIDE 600; 310; 30; 20 MG/100ML; MG/100ML; MG/100ML; MG/100ML
INJECTION, SOLUTION INTRAVENOUS CONTINUOUS
Status: CANCELLED | OUTPATIENT
Start: 2024-01-12

## 2024-01-12 RX ORDER — HYDROCORTISONE 25 MG/G
CREAM TOPICAL 3 TIMES DAILY PRN
Status: DISCONTINUED | OUTPATIENT
Start: 2024-01-12 | End: 2024-01-14 | Stop reason: HOSPADM

## 2024-01-12 RX ORDER — BUPROPION HYDROCHLORIDE 150 MG/1
450 TABLET ORAL DAILY
Status: DISCONTINUED | OUTPATIENT
Start: 2024-01-13 | End: 2024-01-14 | Stop reason: HOSPADM

## 2024-01-12 RX ORDER — ONDANSETRON 2 MG/ML
4 INJECTION INTRAMUSCULAR; INTRAVENOUS EVERY 6 HOURS PRN
Status: DISCONTINUED | OUTPATIENT
Start: 2024-01-12 | End: 2024-01-14 | Stop reason: HOSPADM

## 2024-01-12 RX ORDER — NALOXONE HYDROCHLORIDE 0.4 MG/ML
0.4 INJECTION, SOLUTION INTRAMUSCULAR; INTRAVENOUS; SUBCUTANEOUS
Status: DISCONTINUED | OUTPATIENT
Start: 2024-01-12 | End: 2024-01-14 | Stop reason: HOSPADM

## 2024-01-12 RX ORDER — OXYTOCIN/0.9 % SODIUM CHLORIDE 30/500 ML
340 PLASTIC BAG, INJECTION (ML) INTRAVENOUS CONTINUOUS PRN
Status: DISCONTINUED | OUTPATIENT
Start: 2024-01-12 | End: 2024-01-12 | Stop reason: HOSPADM

## 2024-01-12 RX ORDER — CITRIC ACID/SODIUM CITRATE 334-500MG
30 SOLUTION, ORAL ORAL
Status: COMPLETED | OUTPATIENT
Start: 2024-01-12 | End: 2024-01-12

## 2024-01-12 RX ORDER — SODIUM CHLORIDE, SODIUM LACTATE, POTASSIUM CHLORIDE, CALCIUM CHLORIDE 600; 310; 30; 20 MG/100ML; MG/100ML; MG/100ML; MG/100ML
INJECTION, SOLUTION INTRAVENOUS CONTINUOUS
Status: DISCONTINUED | OUTPATIENT
Start: 2024-01-12 | End: 2024-01-12 | Stop reason: HOSPADM

## 2024-01-12 RX ORDER — MORPHINE SULFATE 1 MG/ML
INJECTION, SOLUTION EPIDURAL; INTRATHECAL; INTRAVENOUS
Status: COMPLETED | OUTPATIENT
Start: 2024-01-12 | End: 2024-01-12

## 2024-01-12 RX ORDER — MORPHINE SULFATE 1 MG/ML
INJECTION, SOLUTION EPIDURAL; INTRATHECAL; INTRAVENOUS
Status: DISCONTINUED
Start: 2024-01-12 | End: 2024-01-12 | Stop reason: HOSPADM

## 2024-01-12 RX ORDER — CEFAZOLIN SODIUM/WATER 2 G/20 ML
2 SYRINGE (ML) INTRAVENOUS
Status: COMPLETED | OUTPATIENT
Start: 2024-01-12 | End: 2024-01-12

## 2024-01-12 RX ORDER — FENTANYL CITRATE 50 UG/ML
INJECTION, SOLUTION INTRAMUSCULAR; INTRAVENOUS
Status: COMPLETED | OUTPATIENT
Start: 2024-01-12 | End: 2024-01-12

## 2024-01-12 RX ORDER — OXYCODONE HYDROCHLORIDE 5 MG/1
5 TABLET ORAL EVERY 4 HOURS PRN
Status: DISCONTINUED | OUTPATIENT
Start: 2024-01-12 | End: 2024-01-14 | Stop reason: HOSPADM

## 2024-01-12 RX ORDER — FENTANYL CITRATE 50 UG/ML
50 INJECTION, SOLUTION INTRAMUSCULAR; INTRAVENOUS EVERY 5 MIN PRN
Status: DISCONTINUED | OUTPATIENT
Start: 2024-01-12 | End: 2024-01-14 | Stop reason: HOSPADM

## 2024-01-12 RX ORDER — METOCLOPRAMIDE HYDROCHLORIDE 5 MG/ML
10 INJECTION INTRAMUSCULAR; INTRAVENOUS EVERY 6 HOURS PRN
Status: DISCONTINUED | OUTPATIENT
Start: 2024-01-12 | End: 2024-01-14 | Stop reason: HOSPADM

## 2024-01-12 RX ORDER — MODIFIED LANOLIN
OINTMENT (GRAM) TOPICAL
Status: DISCONTINUED | OUTPATIENT
Start: 2024-01-12 | End: 2024-01-14 | Stop reason: HOSPADM

## 2024-01-12 RX ORDER — FENTANYL CITRATE 50 UG/ML
25 INJECTION, SOLUTION INTRAMUSCULAR; INTRAVENOUS EVERY 5 MIN PRN
Status: DISCONTINUED | OUTPATIENT
Start: 2024-01-12 | End: 2024-01-14 | Stop reason: HOSPADM

## 2024-01-12 RX ORDER — CARBOPROST TROMETHAMINE 250 UG/ML
250 INJECTION, SOLUTION INTRAMUSCULAR
Status: DISCONTINUED | OUTPATIENT
Start: 2024-01-12 | End: 2024-01-14 | Stop reason: HOSPADM

## 2024-01-12 RX ORDER — BUPIVACAINE HYDROCHLORIDE 7.5 MG/ML
INJECTION, SOLUTION INTRASPINAL
Status: COMPLETED | OUTPATIENT
Start: 2024-01-12 | End: 2024-01-12

## 2024-01-12 RX ORDER — CEFAZOLIN SODIUM/WATER 2 G/20 ML
2 SYRINGE (ML) INTRAVENOUS SEE ADMIN INSTRUCTIONS
Status: DISCONTINUED | OUTPATIENT
Start: 2024-01-12 | End: 2024-01-12 | Stop reason: HOSPADM

## 2024-01-12 RX ORDER — DIPHENHYDRAMINE HCL 25 MG
25 CAPSULE ORAL EVERY 6 HOURS PRN
Status: DISCONTINUED | OUTPATIENT
Start: 2024-01-12 | End: 2024-01-14 | Stop reason: HOSPADM

## 2024-01-12 RX ORDER — ENOXAPARIN SODIUM 100 MG/ML
40 INJECTION SUBCUTANEOUS EVERY 12 HOURS
Status: DISCONTINUED | OUTPATIENT
Start: 2024-01-12 | End: 2024-01-14 | Stop reason: HOSPADM

## 2024-01-12 RX ORDER — IBUPROFEN 800 MG/1
800 TABLET, FILM COATED ORAL EVERY 6 HOURS
Status: DISCONTINUED | OUTPATIENT
Start: 2024-01-13 | End: 2024-01-14 | Stop reason: HOSPADM

## 2024-01-12 RX ORDER — SODIUM CHLORIDE, SODIUM LACTATE, POTASSIUM CHLORIDE, CALCIUM CHLORIDE 600; 310; 30; 20 MG/100ML; MG/100ML; MG/100ML; MG/100ML
INJECTION, SOLUTION INTRAVENOUS CONTINUOUS PRN
Status: DISCONTINUED | OUTPATIENT
Start: 2024-01-12 | End: 2024-01-12

## 2024-01-12 RX ORDER — CITALOPRAM HYDROBROMIDE 20 MG/1
40 TABLET ORAL DAILY
Status: DISCONTINUED | OUTPATIENT
Start: 2024-01-12 | End: 2024-01-14 | Stop reason: HOSPADM

## 2024-01-12 RX ORDER — ONDANSETRON 4 MG/1
4 TABLET, ORALLY DISINTEGRATING ORAL EVERY 30 MIN PRN
Status: DISCONTINUED | OUTPATIENT
Start: 2024-01-12 | End: 2024-01-14 | Stop reason: HOSPADM

## 2024-01-12 RX ORDER — SIMETHICONE 80 MG
80 TABLET,CHEWABLE ORAL 4 TIMES DAILY PRN
Status: DISCONTINUED | OUTPATIENT
Start: 2024-01-12 | End: 2024-01-14 | Stop reason: HOSPADM

## 2024-01-12 RX ORDER — AMOXICILLIN 250 MG
1 CAPSULE ORAL 2 TIMES DAILY
Status: DISCONTINUED | OUTPATIENT
Start: 2024-01-12 | End: 2024-01-14 | Stop reason: HOSPADM

## 2024-01-12 RX ORDER — OXYTOCIN 10 [USP'U]/ML
10 INJECTION, SOLUTION INTRAMUSCULAR; INTRAVENOUS
Status: DISCONTINUED | OUTPATIENT
Start: 2024-01-12 | End: 2024-01-12 | Stop reason: HOSPADM

## 2024-01-12 RX ORDER — MISOPROSTOL 200 UG/1
800 TABLET ORAL
Status: DISCONTINUED | OUTPATIENT
Start: 2024-01-12 | End: 2024-01-12 | Stop reason: HOSPADM

## 2024-01-12 RX ORDER — HYDROMORPHONE HCL IN WATER/PF 6 MG/30 ML
0.4 PATIENT CONTROLLED ANALGESIA SYRINGE INTRAVENOUS EVERY 5 MIN PRN
Status: DISCONTINUED | OUTPATIENT
Start: 2024-01-12 | End: 2024-01-14 | Stop reason: HOSPADM

## 2024-01-12 RX ORDER — METHYLERGONOVINE MALEATE 0.2 MG/ML
200 INJECTION INTRAVENOUS
Status: DISCONTINUED | OUTPATIENT
Start: 2024-01-12 | End: 2024-01-12 | Stop reason: HOSPADM

## 2024-01-12 RX ORDER — PROCHLORPERAZINE 25 MG
25 SUPPOSITORY, RECTAL RECTAL EVERY 12 HOURS PRN
Status: DISCONTINUED | OUTPATIENT
Start: 2024-01-12 | End: 2024-01-14 | Stop reason: HOSPADM

## 2024-01-12 RX ORDER — FLUTICASONE PROPIONATE 50 MCG
1 SPRAY, SUSPENSION (ML) NASAL DAILY
Status: DISCONTINUED | OUTPATIENT
Start: 2024-01-12 | End: 2024-01-14 | Stop reason: HOSPADM

## 2024-01-12 RX ORDER — FERROUS SULFATE 325(65) MG
325 TABLET ORAL
COMMUNITY
End: 2024-01-30

## 2024-01-12 RX ADMIN — PHENYLEPHRINE HYDROCHLORIDE 50 MCG: 10 INJECTION INTRAVENOUS at 07:48

## 2024-01-12 RX ADMIN — FENTANYL CITRATE 15 MCG: 50 INJECTION INTRAMUSCULAR; INTRAVENOUS at 08:40

## 2024-01-12 RX ADMIN — PHENYLEPHRINE HYDROCHLORIDE 50 MCG: 10 INJECTION INTRAVENOUS at 07:53

## 2024-01-12 RX ADMIN — SODIUM CHLORIDE, POTASSIUM CHLORIDE, SODIUM LACTATE AND CALCIUM CHLORIDE 500 ML: 600; 310; 30; 20 INJECTION, SOLUTION INTRAVENOUS at 06:29

## 2024-01-12 RX ADMIN — ACETAMINOPHEN 975 MG: 325 TABLET ORAL at 13:02

## 2024-01-12 RX ADMIN — KETOROLAC TROMETHAMINE 30 MG: 30 INJECTION, SOLUTION INTRAMUSCULAR at 22:23

## 2024-01-12 RX ADMIN — PHENYLEPHRINE HYDROCHLORIDE 50 MCG: 10 INJECTION INTRAVENOUS at 08:05

## 2024-01-12 RX ADMIN — SODIUM CITRATE AND CITRIC ACID MONOHYDRATE 30 ML: 500; 334 SOLUTION ORAL at 06:54

## 2024-01-12 RX ADMIN — Medication 0.15 MG: at 08:40

## 2024-01-12 RX ADMIN — ACETAMINOPHEN 975 MG: 325 TABLET ORAL at 06:54

## 2024-01-12 RX ADMIN — SODIUM CHLORIDE, POTASSIUM CHLORIDE, SODIUM LACTATE AND CALCIUM CHLORIDE: 600; 310; 30; 20 INJECTION, SOLUTION INTRAVENOUS at 07:00

## 2024-01-12 RX ADMIN — BUPIVACAINE 20 ML: 13.3 INJECTION, SUSPENSION, LIPOSOMAL INFILTRATION at 09:03

## 2024-01-12 RX ADMIN — KETOROLAC TROMETHAMINE 30 MG: 30 INJECTION, SOLUTION INTRAMUSCULAR at 10:12

## 2024-01-12 RX ADMIN — KETOROLAC TROMETHAMINE 30 MG: 30 INJECTION, SOLUTION INTRAMUSCULAR at 16:01

## 2024-01-12 RX ADMIN — PHENYLEPHRINE HYDROCHLORIDE 0.3 MCG/KG/MIN: 10 INJECTION INTRAVENOUS at 07:43

## 2024-01-12 RX ADMIN — PHENYLEPHRINE HYDROCHLORIDE 100 MCG: 10 INJECTION INTRAVENOUS at 08:17

## 2024-01-12 RX ADMIN — PHENYLEPHRINE HYDROCHLORIDE 50 MCG: 10 INJECTION INTRAVENOUS at 07:46

## 2024-01-12 RX ADMIN — BUPIVACAINE HYDROCHLORIDE 30 ML: 2.5 INJECTION, SOLUTION EPIDURAL; INFILTRATION; INTRACAUDAL at 09:03

## 2024-01-12 RX ADMIN — ENOXAPARIN SODIUM 40 MG: 100 INJECTION SUBCUTANEOUS at 22:23

## 2024-01-12 RX ADMIN — SENNOSIDES AND DOCUSATE SODIUM 1 TABLET: 8.6; 5 TABLET ORAL at 20:32

## 2024-01-12 RX ADMIN — SODIUM CHLORIDE, POTASSIUM CHLORIDE, SODIUM LACTATE AND CALCIUM CHLORIDE: 600; 310; 30; 20 INJECTION, SOLUTION INTRAVENOUS at 08:22

## 2024-01-12 RX ADMIN — DIPHENHYDRAMINE HYDROCHLORIDE 25 MG: 25 CAPSULE ORAL at 17:47

## 2024-01-12 RX ADMIN — PHENYLEPHRINE HYDROCHLORIDE 50 MCG: 10 INJECTION INTRAVENOUS at 08:00

## 2024-01-12 RX ADMIN — ONDANSETRON 4 MG: 2 INJECTION INTRAMUSCULAR; INTRAVENOUS at 07:34

## 2024-01-12 RX ADMIN — PHENYLEPHRINE HYDROCHLORIDE 50 MCG: 10 INJECTION INTRAVENOUS at 07:50

## 2024-01-12 RX ADMIN — PHENYLEPHRINE HYDROCHLORIDE 100 MCG: 10 INJECTION INTRAVENOUS at 08:24

## 2024-01-12 RX ADMIN — FLUTICASONE PROPIONATE 1 SPRAY: 50 SPRAY, METERED NASAL at 11:43

## 2024-01-12 RX ADMIN — DIPHENHYDRAMINE HYDROCHLORIDE 25 MG: 25 CAPSULE ORAL at 11:36

## 2024-01-12 RX ADMIN — CITALOPRAM 40 MG: 20 TABLET ORAL at 20:32

## 2024-01-12 RX ADMIN — SODIUM CHLORIDE, POTASSIUM CHLORIDE, SODIUM LACTATE AND CALCIUM CHLORIDE: 600; 310; 30; 20 INJECTION, SOLUTION INTRAVENOUS at 08:00

## 2024-01-12 RX ADMIN — BUPIVACAINE HYDROCHLORIDE IN DEXTROSE 1.6 ML: 7.5 INJECTION, SOLUTION SUBARACHNOID at 08:40

## 2024-01-12 RX ADMIN — Medication 2 G: at 07:34

## 2024-01-12 RX ADMIN — SENNOSIDES AND DOCUSATE SODIUM 2 TABLET: 8.6; 5 TABLET ORAL at 10:41

## 2024-01-12 RX ADMIN — Medication 300 ML/HR: at 08:14

## 2024-01-12 RX ADMIN — OXYCODONE HYDROCHLORIDE 5 MG: 5 TABLET ORAL at 11:36

## 2024-01-12 RX ADMIN — ACETAMINOPHEN 975 MG: 325 TABLET ORAL at 20:32

## 2024-01-12 ASSESSMENT — ACTIVITIES OF DAILY LIVING (ADL)
ADLS_ACUITY_SCORE: 18

## 2024-01-12 NOTE — H&P
OB ADMISSION H&P - C SECTION     Date: 2024  NAME: Fatmata Catherine   : 1995  MRN: [id number]     CC: scheduled c section      HPI: Fatmata Catherine is a 28 year old  with  single inter-uterine gestation at 39w4d, with Estimated Date of Delivery: Boris 15, 2024 admitted today for repeat c section.  section secondary to hx of previous . Patient feels well. Has no complaints and reports good fetal movement. Pregnancy has been complicated by severe depression, obesity. Patient denies headache, visual changes, RUQ pain. She denies contractions, leakage of fluid, or vaginal bleeding. Please see prenatal records.     OB HISTORY   OB History    Para Term  AB Living   4 2 2 0 0 2   SAB IAB Ectopic Multiple Live Births   0 0 0 0 2      # Outcome Date GA Lbr Roc/2nd Weight Sex Delivery Anes PTL Lv   4 Current            3 Term 02/15/22 39w0d   M CS-LTranv   TONE   2 Term 16 40w1d 12:20 / 00:52 3.232 kg (7 lb 2 oz) F CS-LTranv EPI N TONE      Complications: Chorioamnionitis      Apgar1: 9  Apgar5: 9   1                 PAST MEDICAL HISTORY  Past Medical History:   Diagnosis Date    Anxiety     Depressive disorder         PAST SURGICAL HISTORY:   History reviewed. No pertinent surgical history.     SOCIAL HISTORY   Reviewed, patient denies smoking, alcohol, and drug use  She is   in committed relationshiop . Father is  involved    MEDICATIONS  Current Facility-Administered Medications   Medication    carboprost (HEMABATE) injection 250 mcg    ceFAZolin Sodium (ANCEF) injection 2 g    ceFAZolin Sodium (ANCEF) injection 2 g    lactated ringers infusion    lactated ringers infusion    lidocaine (LMX4) cream    lidocaine (LMX4) cream    lidocaine 1 % 0.1-1 mL    lidocaine 1 % 0.1-1 mL    methylergonovine (METHERGINE) injection 200 mcg    misoprostol (CYTOTEC) tablet 400 mcg    Or    misoprostol (CYTOTEC) tablet 800 mcg    oxytocin (PITOCIN) 30 units in 500 mL 0.9% NaCl  "infusion    oxytocin (PITOCIN) injection 10 Units    sodium chloride (PF) 0.9% PF flush 3 mL    sodium chloride (PF) 0.9% PF flush 3 mL    sodium chloride (PF) 0.9% PF flush 3 mL    sodium chloride (PF) 0.9% PF flush 3 mL    tranexamic acid 1 g in 100 mL NS IV bag (premix)       ALLERGIES  Allergies   Allergen Reactions    Codeine Itching, Hives and Nausea       ROS: otherwise negative except what is stated in HPI.     PHYSICAL EXAM   /81 (BP Location: Right arm, Patient Position: Semi-Marcial's, Cuff Size: Adult Regular)   Pulse 100   Temp 98.8  F (37.1  C) (Oral)   Resp 16   Ht 1.499 m (4' 11\")   Wt 95.3 kg (210 lb)   SpO2 97%   BMI 42.41 kg/m     Gen: no acute distress, resting comfortably   CV: acyanotic   Heart: regular rate and rhythm   Pulm: unlabored respirations, clear to ausculation bilaterally    Abd: gravid, soft, nontender   Extremities: soft, nontender   FHR: positive, category 1  Echelon: no contractions      LABS  A positive  HIV neg  Hep B neg  RPR non reactive    GBS positive    IMPRESSION:   28 year old  at 39w4d   single inter uterine pregnancy at term, undesired fertility  Pregnancy complications include: severe depression, obesity, history of PIH post partum   section secondary to hx of previous , desire for permanent sterilization    PLAN:   - Admit to hospital  - Type and screen/hgb  - NPO   - Proceed with  section with bilateral salpingectomy  - anesthesia notified       RISK - C SECTION  Patient counseled on risks, benefits, alternatives and expectations of  section.  Risks detailed to include, but not be limited to:  Pain, bleeding, infection, anesthesia complications, possible injury to bowel, bladder, baby and/or adjacent tissues, possible need for blood transfusion (with 1/50,000 risk of bloodborne pathogen [HIV and/or Hepatitis B/C] transmission) or even hysterectomy.  Patient voiced understanding of all R/B/A/E and has agreed to proceed " with  section for delivery if needed or recommended.    Yamel Martino,   [facility]  [phone]

## 2024-01-12 NOTE — PROGRESS NOTES
Pt here for a scheduled  and tubal ligation. Pt on EFM - category 1 tracing obtained. VS are WNL. IV placed, labs drawn and IV fluid bolus given. Incision site clipped and Mark wipes applied. SCDs applied to her lower legs.   Pre-op, Intra-op and post-op education given. Pt transferred to SAAD via wheelchair, accompanied by significant other.   Report given to Etelvina MICHAELS RN. Care relinquished.

## 2024-01-12 NOTE — ANESTHESIA CARE TRANSFER NOTE
Patient: Fatmata Catherine    Procedure: Procedure(s):  REPEAT  SECTION WITH BILATERAL SALPINGECTOMY       Diagnosis: Previous  delivery, delivered [O34.219]  Encounter for sterilization [Z30.2]  Diagnosis Additional Information: No value filed.    Anesthesia Type:   Spinal     Note:    Oropharynx: oropharynx clear of all foreign objects and spontaneously breathing  Level of Consciousness: awake  Oxygen Supplementation: room air    Independent Airway: airway patency satisfactory and stable  Dentition: dentition unchanged  Vital Signs Stable: post-procedure vital signs reviewed and stable  Report to RN Given: handoff report given  Patient transferred to: Labor and Delivery  Comments: Pt awake and conversing. Transported to pt room on floor.  Report given to RN at bedside.   Handoff Report: Identifed the Patient, Identified the Reponsible Provider, Reviewed the pertinent medical history, Discussed the surgical course, Reviewed Intra-OP anesthesia mangement and issues during anesthesia, Set expectations for post-procedure period and Allowed opportunity for questions and acknowledgement of understanding      Vitals:  Vitals Value Taken Time   /58 24  0919   Temp 97.6 24  0919   Pulse 75 24  0919   Resp 16 24  0919   SpO2 99 24  0919       Electronically Signed By: MATT Garrett CRNA  2024  9:25 AM

## 2024-01-12 NOTE — OP NOTE
Section Operative Note      Pre-operative Diagnosis: 1.  Intrauterine pregnancy 39 week 4 days gestation  2.  Hx of previous  x 2  3.  Unwanted fertility  4. obesity    Post-operative Diagnosis: same, delivered    Procedure:  Repeat low transverse  section with bilateral salpingectomy    Surgeon:  Yamel Martino DO    Assist: ESTELLE Urena    Anesthesia:  spinal    Estimated Blood Loss:   593mL    Complications:  None; patient tolerated the procedure well.    Specimens: placenta for hospital disposal    Indications for surgery:  Patient is  who presented for scheduled repeat  section with bilateral salpingectomy. The risks and benefits were discussed including but not limited to bleeding, infection, damage to surrounding structures, and further surgery including hysterectomy and consent obtained to proceed.    Findings:  Viable Male infant weighing 7#4oz, with APGARS 8, 9, extensive subcutaneous and intrabdominal adhesions with peritoneum adhered to anterior uterus and bladder adhered to lower uterine segment, normal appearing ovaries and fallopian tubes bilaterally    Procedure Details   The patient was taken to the Operating Room with IV fluids running, identified as Fatmata Catherine and the procedure verified as  Delivery. A Time Out was held and the above information confirmed.    After administration of spinal anesthesia, the patient was positioned in the left lateral tilt position and was prepped and draped in the usual sterile fashion. A Pfannenstiel incision was made and carried down sharply through the subcutaneous tissue with the scalpel, there were adhesions noted in the sucutaneous tissue.  The fascia was incised in the midline and carried laterally with the cox scissors, and the rectus abdominis muscles bluntly and sharply dissected away from the fascia superiorly and inferiorly to the pubic symphysis.  The rectus muscles were adhered to the  peritoneum in the midline, we worked to carefully find a window superiorly and then the peritoneum was then entered sharply. The peritoneum was noted to be adhered to the anterior uterus so we worked to carefully take down some adhesions to allow space to accommodate delivery.  The vesicouterine peritoneal reflection was incised transversely and the bladder flap was bluntly dissected away from the lower uterine segment. Bladder blade was placed.    A horizontal incision was made in the lower uterine segment, and the incision was extended bilaterally in a curvilinear fashion with gentle blunt traction.  Membranes were ruptured and the amnoitic fluid was  clear.  The infant was delivered from a vertex presentation at 0812 hrs.  There was no nuchal cord.  Mouth and nares were bulb suctioned and cord doubly clamped and cut.  The infant was passed off to the Pediatric team in attendance with findings as noted above.      The placenta was delivered spontaneously, intact, with a 3 vessel cord.  The uterine cavity was wiped free of remaining clot and membrane.  The uterus was exteriorized. There were no extensions of the uterine incision.  The uterus was closed with a layer of continuous running locked 0 vicryl, followed by a second imbricating layer of 0 monocryl.  The uterine incision was inspected and all was hemostatic.  The Fallopian tubes and ovaries were examined and appeared normal.  The right fallopian tube was grasped with a reina and elevated, the mesosalpinx was cauterized and divided in a distal to proximal direction along the fallopian to the level 1cm distal to the right uterine cornua and the tube was then amputated. The same procedure was then carried out on the left fallopian tube. The bladder flap was inspected and was hemostatic. The abdomen was irrigated. The uterus was replaced into the abdominal cavity.  The pericolic gutters were swabbed clean.  The uterine incision was once again inspected once  back in its normal anatomic position and there was some oozing at the right apex of the hysterotomy so multiple figure of 8 sutures utilizing 0 monocryl were placed and then was noted to be hemostatic.  The subfascial space was inspected and hemostasis achieved with electrocautery.  The fascia was closed withcontinuous running 0 vicryl. The subcutaneous tissue was irrigated and hemostasis achieved with electrocautery.  The subcutaneous tissue was reapproximated with 3-0 vicryl suture. Skin edges reapproximated with subcuticular 4.0 monocryl.  The incision was dressed with dermabond.    Estimated blood loss was 593cc.  Sponge and needle counts were correct.  There were no complications.  The patient tolerated the procedure well and was transferred to her recovery room in good condition.  The infant will be under  Nursery status.      Yaeml Martino DO

## 2024-01-12 NOTE — PROGRESS NOTES
Pt doing well after delivery. Bonding well with infant. Able to ambulate to bathroom. Denies dizziness or lightheadedness. Christie removed. Pt is eating and drinking independently.

## 2024-01-12 NOTE — ANESTHESIA PROCEDURE NOTES
"Intrathecal injection Procedure Note    Pre-Procedure   Staff -        Anesthesiologist:  Tushar Holguin MD       Performed By: anesthesiologist       Location: OR       Procedure Start/Stop Times: 2024 7:40 AM and 2024 7:44 AM       Pre-Anesthestic Checklist: patient identified, IV checked, risks and benefits discussed, informed consent, monitors and equipment checked, pre-op evaluation, at physician/surgeon's request and post-op pain management  Timeout:       Correct Patient: Yes        Correct Procedure: Yes        Correct Site: Yes        Correct Position: Yes   Procedure Documentation  Procedure: intrathecal injection       Diagnosis:        Patient Position: sitting       Patient Prep/Sterile Barriers: sterile gloves, mask, patient draped       Skin prep: Chloraprep       Insertion Site: L3-4. (midline approach).       Needle Gauge: 25.        Needle Length (Inches): 3.5        Spinal Needle Type: Pencan       Introducer: 20 G       # of attempts: 1 and  # of redirects:  1    Assessment/Narrative         Paresthesias: Yes.       CSF fluid: clear.       Opening pressure was cmH2O while  Sitting.      Medication(s) Administered   0.75% Hyperbaric Bupivacaine (Intrathecal) - Intrathecal   1.6 mL - 2024 8:40:00 AM  Fentanyl PF (Intrathecal) - Intrathecal   15 mcg - 2024 8:40:00 AM  Morphine PF 1 mg/mL (Intrathecal) - Intrathecal   0.15 mg - 2024 8:40:00 AM  Medication Administration Time: 2024 7:40 AM      FOR [facility] ([facility]) ONLY:   Pain Team Contact information: please page the Pain Team Via [employer]. Search \"Pain\". During daytime hours, please page the attending first. At night please page the resident first.      "

## 2024-01-12 NOTE — ANESTHESIA PROCEDURE NOTES
TAP Procedure Note    Pre-Procedure   Staff -        Anesthesiologist:  Tushar Holguin MD       Performed By: anesthesiologist       Location: OR       Procedure Start/Stop Times: 2024 9:03 AM and 2024 9:08 AM       Pre-Anesthestic Checklist: patient identified, IV checked, site marked, risks and benefits discussed, informed consent, monitors and equipment checked, pre-op evaluation, at physician/surgeon's request and post-op pain management  Timeout:       Correct Patient: Yes        Correct Procedure: Yes        Correct Site: Yes        Correct Position: Yes        Correct Laterality: Yes        Site Marked: Yes  Procedure Documentation  Procedure: TAP       Diagnosis:        Laterality: bilateral       Patient Position: supine       Patient Prep/Sterile Barriers: sterile gloves, mask       Skin prep: Chloraprep       Insertion Site: T9-10.       Needle Gauge: 20.        Needle Length (Inches): 6        Ultrasound guided       1. Ultrasound was used to identify targeted nerve, plexus, vascular marker, or fascial plane and place a needle adjacent to it in real-time.       2. Ultrasound was used to visualize the spread of anesthetic in close proximity to the above referenced structure.       3. A permanent image is entered into the patient's record.       4. The visualized anatomic structures appeared normal.       5. There were no apparent abnormal pathologic findings.    Assessment/Narrative         The placement was negative for: blood aspirated, painful injection and site bleeding       Paresthesias: No.       Bolus given via needle..        Secured via.        Insertion/Infusion Method: Single Shot       Complications: none    Medication(s) Administered   Bupivacaine 0.25% PF (Infiltration) - Infiltration   30 mL - 2024 9:03:00 AM  Bupivacaine liposome (Exparel) 1.3% LA inj susp (Infiltration) - Infiltration   20 mL - 2024 9:03:00 AM  Medication Administration Time:  "1/12/2024 9:03 AM      FOR [facility] ([facility]) ONLY:   Pain Team Contact information: please page the Pain Team Via [employer]. Search \"Pain\". During daytime hours, please page the attending first. At night please page the resident first.      "

## 2024-01-12 NOTE — ANESTHESIA PREPROCEDURE EVALUATION
Anesthesia Pre-Procedure Evaluation    Patient: Fatmata Catherine   MRN: [id number] : 1995        Procedure : Procedure(s):  REPEAT  SECTION WITH BILATERAL SALPINGECTOMY          Past Medical History:   Diagnosis Date    Anxiety     Depressive disorder       History reviewed. No pertinent surgical history.   Allergies   Allergen Reactions    Codeine Itching, Hives and Nausea      Social History     Tobacco Use    Smoking status: Never    Smokeless tobacco: Never   Substance Use Topics    Alcohol use: Yes     Comment: Alcoholic Drinks/day: occasional      Wt Readings from Last 1 Encounters:   24 95.3 kg (210 lb)        Anesthesia Evaluation   Pt has had prior anesthetic.         ROS/MED HX  ENT/Pulmonary:  - neg pulmonary ROS     Neurologic:  - neg neurologic ROS     Cardiovascular:  - neg cardiovascular ROS     METS/Exercise Tolerance:     Hematologic:  - neg hematologic  ROS     Musculoskeletal:  - neg musculoskeletal ROS     GI/Hepatic:     (+) GERD,                   Renal/Genitourinary:  - neg Renal ROS     Endo:     (+)               Obesity,       Psychiatric/Substance Use:     (+) psychiatric history depression and anxiety       Infectious Disease:  - neg infectious disease ROS   (+) Recent Fever,           Malignancy:       Other:            Physical Exam    Airway  airway exam normal      Mallampati: I   TM distance: > 3 FB   Neck ROM: full   Mouth opening: > 3 cm    Respiratory Devices and Support         Dental       (+) Minor Abnormalities - some fillings, tiny chips      Cardiovascular   cardiovascular exam normal       Rhythm and rate: regular and normal     Pulmonary   pulmonary exam normal        breath sounds clear to auscultation           OUTSIDE LABS:  CBC:   Lab Results   Component Value Date    WBC 6.4 2024    WBC 7.1 2023    HGB 8.6 (L) 2024    HGB 9.4 (L) 2023    HCT 28.4 (L) 2024    HCT 30.6 (L) 2023     2024      "11/07/2023     BMP:   Lab Results   Component Value Date     11/06/2023     06/08/2022    POTASSIUM 3.7 11/06/2023    POTASSIUM 4.0 06/08/2022    CHLORIDE 104 11/06/2023    CHLORIDE 106 06/08/2022    CO2 22 11/06/2023    CO2 26 06/08/2022    BUN 5.6 (L) 11/06/2023    BUN 6 (L) 06/08/2022    CR 0.50 (L) 11/06/2023    CR 0.76 06/08/2022     (H) 11/06/2023    GLC 88 06/08/2022     COAGS: No results found for: \"PTT\", \"INR\", \"FIBR\"  POC:   Lab Results   Component Value Date    HCGS Negative 06/08/2022     HEPATIC:   Lab Results   Component Value Date    ALBUMIN 3.0 (L) 11/06/2023    PROTTOTAL 6.0 (L) 11/06/2023    ALT 18 11/06/2023    AST 28 11/06/2023    ALKPHOS 109 (H) 11/06/2023    BILITOTAL 0.4 11/06/2023     OTHER:   Lab Results   Component Value Date    LACT 0.9 11/06/2023    HIREN 8.6 11/06/2023    LIPASE 22 11/06/2023       Anesthesia Plan    ASA Status:  3    NPO Status:  NPO Appropriate    Anesthesia Type: Spinal.   Induction: Intravenous.           Consents    Anesthesia Plan(s) and associated risks, benefits, and realistic alternatives discussed. Questions answered and patient/representative(s) expressed understanding.     - Discussed:     - Discussed with:  Patient, Spouse            Postoperative Care    Pain management: IV analgesics, Oral pain medications, Peripheral nerve block (Single Shot), Multi-modal analgesia.   PONV prophylaxis: Ondansetron (or other 5HT-3)     Comments:               Tushar Holguin MD    I have reviewed the pertinent notes and labs in the chart from the past 30 days and (re)examined the patient.  Any updates or changes from those notes are reflected in this note.                  "

## 2024-01-13 LAB
BASOPHILS # BLD AUTO: 0 10E3/UL (ref 0–0.2)
BASOPHILS NFR BLD AUTO: 0 %
EOSINOPHIL # BLD AUTO: 0.1 10E3/UL (ref 0–0.7)
EOSINOPHIL NFR BLD AUTO: 1 %
ERYTHROCYTE [DISTWIDTH] IN BLOOD BY AUTOMATED COUNT: 17.2 % (ref 10–15)
HCT VFR BLD AUTO: 26.8 % (ref 35–47)
HGB BLD-MCNC: 8.1 G/DL (ref 11.7–15.7)
HGB BLD-MCNC: 9.1 G/DL (ref 11.7–15.7)
IMM GRANULOCYTES # BLD: 0 10E3/UL
IMM GRANULOCYTES NFR BLD: 0 %
LYMPHOCYTES # BLD AUTO: 1.1 10E3/UL (ref 0.8–5.3)
LYMPHOCYTES NFR BLD AUTO: 12 %
MCH RBC QN AUTO: 24.5 PG (ref 26.5–33)
MCHC RBC AUTO-ENTMCNC: 30.2 G/DL (ref 31.5–36.5)
MCV RBC AUTO: 81 FL (ref 78–100)
MONOCYTES # BLD AUTO: 0.5 10E3/UL (ref 0–1.3)
MONOCYTES NFR BLD AUTO: 5 %
NEUTROPHILS # BLD AUTO: 7.6 10E3/UL (ref 1.6–8.3)
NEUTROPHILS NFR BLD AUTO: 82 %
NRBC # BLD AUTO: 0 10E3/UL
NRBC BLD AUTO-RTO: 0 /100
PLATELET # BLD AUTO: 406 10E3/UL (ref 150–450)
RBC # BLD AUTO: 3.3 10E6/UL (ref 3.8–5.2)
WBC # BLD AUTO: 9.2 10E3/UL (ref 4–11)

## 2024-01-13 PROCEDURE — 250N000013 HC RX MED GY IP 250 OP 250 PS 637: Performed by: OBSTETRICS & GYNECOLOGY

## 2024-01-13 PROCEDURE — 36415 COLL VENOUS BLD VENIPUNCTURE: CPT | Performed by: OBSTETRICS & GYNECOLOGY

## 2024-01-13 PROCEDURE — 85018 HEMOGLOBIN: CPT | Performed by: OBSTETRICS & GYNECOLOGY

## 2024-01-13 PROCEDURE — 120N000001 HC R&B MED SURG/OB

## 2024-01-13 PROCEDURE — 85025 COMPLETE CBC W/AUTO DIFF WBC: CPT | Performed by: OBSTETRICS & GYNECOLOGY

## 2024-01-13 PROCEDURE — 250N000011 HC RX IP 250 OP 636: Mod: JZ | Performed by: OBSTETRICS & GYNECOLOGY

## 2024-01-13 RX ORDER — OXYCODONE HYDROCHLORIDE 5 MG/1
10 TABLET ORAL EVERY 4 HOURS PRN
Status: DISCONTINUED | OUTPATIENT
Start: 2024-01-13 | End: 2024-01-14 | Stop reason: HOSPADM

## 2024-01-13 RX ADMIN — ACETAMINOPHEN 975 MG: 325 TABLET ORAL at 15:37

## 2024-01-13 RX ADMIN — ACETAMINOPHEN 975 MG: 325 TABLET ORAL at 20:58

## 2024-01-13 RX ADMIN — SENNOSIDES AND DOCUSATE SODIUM 2 TABLET: 8.6; 5 TABLET ORAL at 20:58

## 2024-01-13 RX ADMIN — OXYCODONE HYDROCHLORIDE 5 MG: 5 TABLET ORAL at 09:52

## 2024-01-13 RX ADMIN — IBUPROFEN 800 MG: 800 TABLET ORAL at 06:17

## 2024-01-13 RX ADMIN — ENOXAPARIN SODIUM 40 MG: 100 INJECTION SUBCUTANEOUS at 09:53

## 2024-01-13 RX ADMIN — ENOXAPARIN SODIUM 40 MG: 100 INJECTION SUBCUTANEOUS at 21:02

## 2024-01-13 RX ADMIN — BUPROPION HYDROCHLORIDE 450 MG: 150 TABLET, EXTENDED RELEASE ORAL at 08:44

## 2024-01-13 RX ADMIN — SIMETHICONE 80 MG: 80 TABLET, CHEWABLE ORAL at 06:21

## 2024-01-13 RX ADMIN — FLUTICASONE PROPIONATE 1 SPRAY: 50 SPRAY, METERED NASAL at 09:06

## 2024-01-13 RX ADMIN — ACETAMINOPHEN 975 MG: 325 TABLET ORAL at 01:56

## 2024-01-13 RX ADMIN — IBUPROFEN 800 MG: 800 TABLET ORAL at 19:39

## 2024-01-13 RX ADMIN — OXYCODONE HYDROCHLORIDE 5 MG: 5 TABLET ORAL at 23:12

## 2024-01-13 RX ADMIN — OXYCODONE HYDROCHLORIDE 5 MG: 5 TABLET ORAL at 14:15

## 2024-01-13 RX ADMIN — ACETAMINOPHEN 975 MG: 325 TABLET ORAL at 07:45

## 2024-01-13 RX ADMIN — CITALOPRAM 40 MG: 20 TABLET ORAL at 20:57

## 2024-01-13 RX ADMIN — OXYCODONE HYDROCHLORIDE 5 MG: 5 TABLET ORAL at 18:32

## 2024-01-13 RX ADMIN — IBUPROFEN 800 MG: 800 TABLET ORAL at 13:37

## 2024-01-13 RX ADMIN — METOCLOPRAMIDE HYDROCHLORIDE 10 MG: 5 INJECTION INTRAMUSCULAR; INTRAVENOUS at 11:42

## 2024-01-13 RX ADMIN — SENNOSIDES AND DOCUSATE SODIUM 2 TABLET: 8.6; 5 TABLET ORAL at 08:44

## 2024-01-13 ASSESSMENT — ACTIVITIES OF DAILY LIVING (ADL)
ADLS_ACUITY_SCORE: 18

## 2024-01-13 NOTE — ANESTHESIA POSTPROCEDURE EVALUATION
Patient: Fatmata Catherine    Procedure: Procedure(s):  REPEAT  SECTION WITH BILATERAL SALPINGECTOMY       Anesthesia Type:  Spinal    Note:     Postop Pain Control: Uneventful            Sign Out: Well controlled pain   PONV: No   Neuro/Psych: Uneventful            Sign Out: Acceptable/Baseline neuro status   Airway/Respiratory: Uneventful            Sign Out: Acceptable/Baseline resp. status   CV/Hemodynamics: Uneventful            Sign Out: Acceptable CV status; No obvious hypovolemia; No obvious fluid overload   Other NRE: NONE   DID A NON-ROUTINE EVENT OCCUR? No           Last vitals:  Vitals:    24 0156 24 0600 24 0952   BP: 129/75 125/74 126/69   Pulse:  90 102   Resp: 18 18 17   Temp: 36.9  C (98.4  F) 36.9  C (98.4  F) 36.8  C (98.3  F)   SpO2:          Electronically Signed By: Tushar Cummings MD  2024  12:21 PM

## 2024-01-13 NOTE — PROGRESS NOTES
Pt called re: increased pain in right lower back and abdomen. Pt rates pain 6-7. Fundus firm. Vitals stable.   Notified Dr Swartz.   Stat CBC ordered  Lab notified  Bladder scan post void 153ml

## 2024-01-13 NOTE — PLAN OF CARE
Problem: Adult Inpatient Plan of Care  Goal: Plan of Care Review  Outcome: Progressing     Problem: Postpartum ( Delivery)  Goal: Effective Urinary Elimination  Outcome: Progressing     Problem: Postpartum ( Delivery)  Goal: Optimal Pain Control and Function  Outcome: Progressing    Pt VSS. Pulse ox in place. Pain controlled with tylenol and ice packs. Incision BRIELLE, CDI. Voiding and ambulating independently. Fundus firm, lochia appropriate.

## 2024-01-13 NOTE — PROGRESS NOTES
"Fatmata Catherine  January 13, 2024    S: pt is doing well.  Tolerating po intake and pain is well controlled.  Ambulating.  D    O:/69 (BP Location: Right arm, Patient Position: Semi-Marcial's, Cuff Size: Adult Regular)   Pulse 102   Temp 98.3  F (36.8  C) (Oral)   Resp 17   Ht 1.499 m (4' 11\")   Wt 95.3 kg (210 lb)   SpO2 97%   Breastfeeding Unknown   BMI 42.41 kg/m    Recent Labs   Lab 01/13/24  0650 01/12/24  1318 01/12/24  0623   HGB 9.1* 8.4* 8.6*     Abdomen: soft, non distended, fundus firm below the umbilicus.  Incision is C/D/I  Ext: non tender, no edema or erythema    A/P: s/p LTCS POD #1  Doing well  Continue care  Discharge planning for Sunday or Monday     Payal Swartz MD  "

## 2024-01-14 VITALS
BODY MASS INDEX: 42.33 KG/M2 | OXYGEN SATURATION: 98 % | DIASTOLIC BLOOD PRESSURE: 82 MMHG | HEART RATE: 72 BPM | TEMPERATURE: 98.5 F | WEIGHT: 210 LBS | RESPIRATION RATE: 16 BRPM | HEIGHT: 59 IN | SYSTOLIC BLOOD PRESSURE: 121 MMHG

## 2024-01-14 PROCEDURE — 250N000013 HC RX MED GY IP 250 OP 250 PS 637: Performed by: OBSTETRICS & GYNECOLOGY

## 2024-01-14 RX ORDER — IBUPROFEN 800 MG/1
800 TABLET, FILM COATED ORAL EVERY 6 HOURS
Qty: 30 TABLET | Refills: 0 | Status: SHIPPED | OUTPATIENT
Start: 2024-01-14

## 2024-01-14 RX ORDER — AMOXICILLIN 250 MG
1 CAPSULE ORAL 2 TIMES DAILY
Qty: 30 TABLET | Refills: 1 | Status: SHIPPED | OUTPATIENT
Start: 2024-01-14 | End: 2024-01-30

## 2024-01-14 RX ORDER — OXYCODONE HYDROCHLORIDE 5 MG/1
5 TABLET ORAL EVERY 4 HOURS PRN
Qty: 18 TABLET | Refills: 0 | Status: SHIPPED | OUTPATIENT
Start: 2024-01-14 | End: 2024-01-30

## 2024-01-14 RX ADMIN — SENNOSIDES AND DOCUSATE SODIUM 1 TABLET: 8.6; 5 TABLET ORAL at 08:30

## 2024-01-14 RX ADMIN — BUPROPION HYDROCHLORIDE 450 MG: 150 TABLET, EXTENDED RELEASE ORAL at 08:30

## 2024-01-14 RX ADMIN — OXYCODONE HYDROCHLORIDE 5 MG: 5 TABLET ORAL at 02:59

## 2024-01-14 RX ADMIN — IBUPROFEN 800 MG: 800 TABLET ORAL at 01:34

## 2024-01-14 RX ADMIN — ACETAMINOPHEN 975 MG: 325 TABLET ORAL at 02:59

## 2024-01-14 RX ADMIN — IBUPROFEN 800 MG: 800 TABLET ORAL at 08:30

## 2024-01-14 RX ADMIN — ACETAMINOPHEN 975 MG: 325 TABLET ORAL at 08:30

## 2024-01-14 ASSESSMENT — ACTIVITIES OF DAILY LIVING (ADL)
ADLS_ACUITY_SCORE: 18

## 2024-01-14 NOTE — DISCHARGE SUMMARY
OB Discharge Summary      Date:  2024    Name:  Fatmata Catherine  :  1995  MRN:  [id number]      Admission Date:  2024  Delivery Date: 2024   Gestational Age at Delivery:  39w4d  Discharge Date:  2024    Principal Diagnosis:    Patient Active Problem List   Diagnosis     delivery delivered    Encounter for triage in pregnant patient    Class 2 severe obesity with serious comorbidity and body mass index (BMI) of 38.0 to 38.9 in adult, unspecified obesity type (H)    Depression with anxiety    Hidradenitis suppurativa    Borderline personality disorder (H)    ADHD (attention deficit hyperactivity disorder)    History of  delivery, antepartum    Pain    Nausea    Right upper quadrant pain    Upper back pain on right side    S/P  section         Conditions complicating Pregnancy: depression    Procedure(s) Performed:  repeat low transverse  section    Indication for :  hx of previous   Indication for Induction:  n/a     Condition at Discharge:  stable     Discharge Medications:      Review of your medicines        START taking        Dose / Directions   ibuprofen 800 MG tablet  Commonly known as: ADVIL/MOTRIN      Dose: 800 mg  Take 1 tablet (800 mg) by mouth every 6 hours  Quantity: 30 tablet  Refills: 0     oxyCODONE 5 MG tablet  Commonly known as: ROXICODONE      Dose: 5 mg  Take 1 tablet (5 mg) by mouth every 4 hours as needed for severe pain  Quantity: 18 tablet  Refills: 0     senna-docusate 8.6-50 MG tablet  Commonly known as: SENOKOT-S/PERICOLACE      Dose: 1 tablet  Take 1 tablet by mouth 2 times daily  Quantity: 30 tablet  Refills: 1            CONTINUE these medicines which have NOT CHANGED        Dose / Directions   * buPROPion 300 MG 24 hr tablet  Commonly known as: WELLBUTRIN XL      Dose: 300 mg  Take 300 mg by mouth every morning Take 300 mg tablet along with 150 mg tablet for total daily dose of 450 mg.  Refills: 0     *  buPROPion 150 MG 12 hr tablet  Commonly known as: WELLBUTRIN SR      Dose: 150 mg  Take 150 mg by mouth every morning Take 150 mg tablet along with 300 mg tablet for total daily dose of 450 mg.  Refills: 0     citalopram 40 MG tablet  Commonly known as: celeXA      Dose: 40 mg  Take 40 mg by mouth daily  Refills: 0     famotidine 40 MG tablet  Commonly known as: PEPCID      Dose: 40 mg  Take 40 mg by mouth 2 times daily  Refills: 0     ferrous sulfate 325 (65 Fe) MG tablet  Commonly known as: FEROSUL  Indication: Anemia From Inadequate Iron in the Body      Dose: 325 mg  Take 325 mg by mouth daily (with breakfast)  Refills: 0           * This list has 2 medication(s) that are the same as other medications prescribed for you. Read the directions carefully, and ask your doctor or other care provider to review them with you.                   Where to get your medicines        [id number] [place], [place] - [facility] [address] AT [address] [place] [address] [facility] [address]      Hours: 24-hours Phone: [phone]   senna-docusate 8.6-50 MG tablet       Some of these will need a paper prescription and others can be bought over the counter. Ask your nurse if you have questions.    Bring a paper prescription for each of these medications  ibuprofen 800 MG tablet  oxyCODONE 5 MG tablet          Discharge Plan:    Follow up with /CNM:  2 weeks and 6 weeks.    Patient Instructions:      Physical activity: No lifting greater than 10-15lbs, nothing in vagina, no intercourse or douching x 6 weeks    Diet:  as tolerated    Medication: as above.     Other:        Physician/CNM: Yamel Martino DO    Name:  Fatmata RANJIT Catherine  :  1995  MRN:  [id number]

## 2024-01-14 NOTE — PLAN OF CARE
VSS. Fundus firm without massage. Incision open to air, CDI. Scant lochia, no clots.Voiding and ambulating independently. Pain controlled with Tylenol, Ibuprofen, and Oxycodone. Bonding well with baby.      Problem: Adult Inpatient Plan of Care  Goal: Readiness for Transition of Care  Outcome: Progressing     Problem: Postpartum ( Delivery)  Goal: Optimal Pain Control and Function  Outcome: Progressing  Intervention: Prevent or Manage Pain  Recent Flowsheet Documentation  Taken 2024 0259 by Melly Luna, RN  Pain Management Interventions: medication (see MAR)  Taken 2024 2312 by Melly Luna, RN  Pain Management Interventions: medication (see MAR)  Taken 2024 205 by Melly Luna, RN  Pain Management Interventions:   medication (see MAR)   cold applied  Taken 2024 193 by Melly Luna, RN  Pain Management Interventions: medication (see MAR)

## 2024-01-14 NOTE — PLAN OF CARE
"  Problem: Adult Inpatient Plan of Care  Goal: Plan of Care Review  Description: The Plan of Care Review/Shift note should be completed every shift.  The Outcome Evaluation is a brief statement about your assessment that the patient is improving, declining, or no change.  This information will be displayed automatically on your shift  note.  Outcome: Progressing  Goal: Patient-Specific Goal (Individualized)  Description: You can add care plan individualizations to a care plan. Examples of Individualization might be:  \"Parent requests to be called daily at 9am for status\", \"I have a hard time hearing out of my right ear\", or \"Do not touch me to wake me up as it startles  me\".  Outcome: Progressing  Goal: Absence of Hospital-Acquired Illness or Injury  Outcome: Progressing  Goal: Optimal Comfort and Wellbeing  Outcome: Progressing  Intervention: Provide Person-Centered Care  Recent Flowsheet Documentation  Taken 2024 0842 by Luzmaria Mayes RN  Trust Relationship/Rapport:   care explained   choices provided   emotional support provided   empathic listening provided   questions answered   questions encouraged   reassurance provided   thoughts/feelings acknowledged  Goal: Readiness for Transition of Care  Outcome: Progressing     Problem: Postpartum ( Delivery)  Goal: Hemostasis  Outcome: Progressing  Goal: Effective Bowel Elimination  Outcome: Progressing  Goal: Fluid and Electrolyte Balance  Outcome: Progressing  Goal: Absence of Infection Signs and Symptoms  Outcome: Progressing  Goal: Optimal Pain Control and Function  Outcome: Progressing  Goal: Nausea and Vomiting Relief  Outcome: Progressing  Goal: Effective Urinary Elimination  Outcome: Progressing  Goal: Effective Oxygenation and Ventilation  Outcome: Progressing   Goal Outcome Evaluation:                    Pt VSS, fundus firm with scant bleeding.  incision CDI. Pain controlled with ordered medications. Up independently in room and " voiding well. Formula feeding baby and attentive to all  cues. Questions answered. Feels ready to discharge home.

## 2024-01-14 NOTE — DISCHARGE INSTRUCTIONS
Warning Signs after Having a Baby    Keep this paper on your fridge or somewhere else where you can see it.    Call your provider if you have any of these symptoms up to 12 weeks after having your baby.    Thoughts of hurting yourself or your baby  Pain in your chest or trouble breathing  Severe headache not helped by pain medicine  Eyesight concerns (blurry vision, seeing spots or flashes of light, other changes to eyesight)  Fainting, shaking or other signs of a seizure    Call 9-1-1 if you feel that it is an emergency.     The symptoms below can happen to anyone after giving birth. They can be very serious. Call your provider if you have any of these warning signs.    My provider s phone number: _______________________    Losing too much blood (hemorrhage)    Call your provider if you soak through a pad in less than an hour or pass blood clots bigger than a golf ball. These may be signs that you are bleeding too much.    Blood clots in the legs or lungs    After you give birth, your body naturally clots its blood to help prevent blood loss. Sometimes this increased clotting can happen in other areas of the body, like the legs or lungs. This can block your blood flow and be very dangerous.     Call your provider if you:  Have a red, swollen spot on the back of your leg that is warm or painful when you touch it.   Are coughing up blood.     Infection    Call your provider if you have any of these symptoms:  Fever of 100.4 F (38 C) or higher.  Pain or redness around your stitches if you had an incision.   Any yellow, white, or green fluid coming from places where you had stitches or surgery.    Mood Problems (postpartum depression)    Many people feel sad or have mood changes after having a baby. But for some people, these mood swings are worse.     Call your provider right away if you feel so anxious or nervous that you can't care for yourself or your baby.    Preeclampsia (high blood pressure)    Even if you  didn't have high blood pressure when you were pregnant, you are at risk for the high blood pressure disease called preeclampsia. This risk can last up to 12 weeks after giving birth.     Call your provider if you have:   Pain on your right side under your rib cage  Sudden swelling in the hands and face    Remember: You know your body. If something doesn't feel right, get medical help.     For informational purposes only. Not to replace the advice of your health care provider. Copyright 2020 [facility]. All rights reserved. Clinically reviewed by Meg Casas, RNC-OB, MSN. Racemi [id number] - Rev 02/23.

## 2024-01-14 NOTE — PROGRESS NOTES
" Postpartum Day 2    Patient Name:  Fatmata Catherine  :  1995  MRN:  [id number]      Assessment:  Normal postpartum course.  Anemia    Plan:  Continue current care.  Discharge home today.    Subjective:  The patient feels well:  Voiding without difficulty, lochia normal, tolerating normal diet, and passing flatus.  Pain is well controlled with current medications.  The patient has no emotional concerns.  The baby is well.    YOB: 2023     Birth Time: 8:12 AM     Prenatal Complications: severe depression, obesity, history of PIH post partum     Objective:  /82 (BP Location: Right arm, Patient Position: Semi-Marcial's, Cuff Size: Adult Regular)   Pulse 72   Temp 98.5  F (36.9  C) (Oral)   Resp 16   Ht 1.499 m (4' 11\")   Wt 95.3 kg (210 lb)   SpO2 98%   Breastfeeding Unknown   BMI 42.41 kg/m    Patient Vitals for the past 24 hrs:   BP Temp Temp src Pulse Resp SpO2   24 0841 121/82 98.5  F (36.9  C) Oral 72 16 98 %   24 0300 120/78 98.1  F (36.7  C) Oral 86 16 --   24 2038 117/73 99.1  F (37.3  C) Oral 112 16 97 %   24 1630 116/69 98.6  F (37  C) Oral 106 16 97 %   24 1501 113/80 -- -- -- -- 97 %   24 1419 -- 97.9  F (36.6  C) Oral -- 18 --       Exam: Patient A&O x 3. No acute distress, breathing unlabored. The amount and color of the lochia is appropriate for the duration of recovery. Uterine fundus is firm at U-1. Urinary output is adequate. The uncovered low transverse incision is healing well.  The patient is ambulating well without assistance.  The patient is tolerating a regular diet.    Lab Results   Component Value Date    AS Negative 2024    CHPCRT Negative 2021    GCPCRT Negative 2021    HGB 8.1 (L) 2024       Immunization History   Administered Date(s) Administered    COVID-19 Monovalent 18+ (Moderna) 2021, 2022    DTAP (<7y) 1996, 1996, 1996, 10/31/1997, 2001    HPV9 " 09/28/2015    Hepatitis B, Peds 1996, 07/25/1997, 09/16/1998, 04/14/1999    Hib, Unspecified 1996, 1996, 1996, 07/25/1997    Hpv, Unspecified  09/28/2015    Influenza (IIV3) PF 12/05/2003    MMR 07/25/1997, 05/31/2001    Poliovirus, inactivated (IPV) 1996, 1996, 1996, 05/31/2001    TD,PF 7+ (Tenivac) 09/28/2015    TDAP (Adacel,Boostrix) 12/02/2021    Varicella 07/28/1997         Provider:  Yamel Martino DO    Date:  1/14/2024  Time:  11:08 AM

## 2024-01-15 ENCOUNTER — TELEPHONE (OUTPATIENT)
Dept: OBGYN | Facility: CLINIC | Age: 29
End: 2024-01-15
Payer: COMMERCIAL

## 2024-01-15 NOTE — TELEPHONE ENCOUNTER
OB Follow Up Phone Call    :  N/A    Language:  English    Discharge Follow-Up:  Follow-Up call by Outreach nurse: Call placed    Type of Delivery:      Feeding Method:  LUCIA    Feedings in 24Hrs:  >8    Breast engorgement:   N/A    Nipple tenderness:  N/A    Non-nursing engorgement discussed:  Yes    Amount of Feeding:  15 cc  Encouraged to increase amount as demanded  Number of Infant Stools in 24 hours:  >3    Stool:  Transition    Number of Infant voids in 24 hours:  >3    Urine:  Clear    Infant Jaundice:  Mom states he looks a little jaundice but nothing she is concerned about. She states he is waking for feeds, voiding/stooling.    Discussed increasing s/s of Jaundice:  Yes      Spoke with pt/mom and she states that things are going well at home. NB follow up tomorrow. Denies concerns at this time. Enc to increase vol of feeding prn as tolerated. Mom states he was a little spitty in the hospital so she has not been increasing his volume. States he has been fussy and she has been using TYL for his circ. Denies any concerns with the circ related to infection. No concerns noted. Edu discussed. Questions answered.

## 2024-01-16 LAB
PATH REPORT.COMMENTS IMP SPEC: NORMAL
PATH REPORT.COMMENTS IMP SPEC: NORMAL
PATH REPORT.FINAL DX SPEC: NORMAL
PATH REPORT.GROSS SPEC: NORMAL
PATH REPORT.MICROSCOPIC SPEC OTHER STN: NORMAL
PATH REPORT.RELEVANT HX SPEC: NORMAL
PHOTO IMAGE: NORMAL

## 2024-01-30 ENCOUNTER — TELEPHONE (OUTPATIENT)
Dept: ENDOCRINOLOGY | Facility: CLINIC | Age: 29
End: 2024-01-30

## 2024-01-30 ENCOUNTER — VIRTUAL VISIT (OUTPATIENT)
Dept: ENDOCRINOLOGY | Facility: CLINIC | Age: 29
End: 2024-01-30
Payer: COMMERCIAL

## 2024-01-30 VITALS — WEIGHT: 200 LBS | BODY MASS INDEX: 40.32 KG/M2 | HEIGHT: 59 IN

## 2024-01-30 DIAGNOSIS — E66.01 CLASS 3 SEVERE OBESITY WITH SERIOUS COMORBIDITY AND BODY MASS INDEX (BMI) OF 40.0 TO 44.9 IN ADULT, UNSPECIFIED OBESITY TYPE (H): Primary | ICD-10-CM

## 2024-01-30 DIAGNOSIS — E66.813 CLASS 3 SEVERE OBESITY WITH SERIOUS COMORBIDITY AND BODY MASS INDEX (BMI) OF 40.0 TO 44.9 IN ADULT, UNSPECIFIED OBESITY TYPE (H): Primary | ICD-10-CM

## 2024-01-30 PROCEDURE — 99213 OFFICE O/P EST LOW 20 MIN: CPT | Mod: 95

## 2024-01-30 RX ORDER — LEVALBUTEROL TARTRATE 45 UG/1
1-2 AEROSOL, METERED ORAL EVERY 4 HOURS PRN
COMMUNITY

## 2024-01-30 RX ORDER — LORAZEPAM 0.5 MG/1
0.5 TABLET ORAL EVERY 8 HOURS PRN
COMMUNITY
Start: 2024-01-02

## 2024-01-30 NOTE — PROGRESS NOTES
Virtual Visit Check-In    During this virtual visit the patient is located in [place], patient verifies this as the location during the entirety of this visit.     Fatmata is a 28 year old who is being evaluated via a billable video visit.      How would you like to obtain your AVS? MyChart  If the video visit is dropped, the invitation should be resent by: Text to cell phone: [phone]  Will anyone else be joining your video visit? No        Video-Visit Details    Type of service:  Video Visit   Video Start Time:  10:00am  Video End Time: 10:23am    Originating Location (pt. Location): Home    Distant Location (provider location):  Off-site  Platform used for Video Visit: Aissatou Brito, EMT

## 2024-01-30 NOTE — LETTER
2024       RE: Fatmata Catherine  [address] [place] [address]     Dear Colleague,    Thank you for referring your patient, Fatmata Catherine, to the [facility]. Please see a copy of my visit note below.    Virtual Visit Check-In    During this virtual visit the patient is located in [place], patient verifies this as the location during the entirety of this visit.     Fatmata is a 28 year old who is being evaluated via a billable video visit.      How would you like to obtain your AVS? MyChart  If the video visit is dropped, the invitation should be resent by: Text to cell phone: [phone]  Will anyone else be joining your video visit? No        Video-Visit Details    Type of service:  Video Visit   Video Start Time:  10:00am  Video End Time: 10:23am    Originating Location (pt. Location): Home    Distant Location (provider location):  Off-site  Platform used for Video Visit: Aissatou Brito EMT                             Return Medical Weight Management Note     Fatmata Catherine  MRN:  [id number]  :  1995  VAISHALI:  2024    Dear Physician No Ref-Primary,    I had the pleasure of seeing your patient Fatmata Catherine. She is a 28 year old female who I am continuing to see for treatment of obesity related to:        10/5/2022     9:59 AM   --   I have the following health issues associated with obesity High Blood Pressure    Stress Incontinence   I have the following symptoms associated with obesity Depression    Back Pain    Fatigue    Irregular Menstral Cycle       Assessment & Plan  Problem List Items Addressed This Visit    None  Visit Diagnoses       Class 3 severe obesity with serious comorbidity and body mass index (BMI) of 40.0 to 44.9 in adult, unspecified obesity type (H)    -  Primary    Relevant Medications    tirzepatide-Weight Management (ZEPBOUND) 2.5 MG/0.5ML  prefilled pen    tirzepatide-Weight Management (ZEPBOUND) 5 MG/0.5ML prefilled pen    Other Relevant Orders    Comprehensive metabolic panel    Hemoglobin A1c    Lipid panel reflex to direct LDL Fasting           Plan  Start Zepbound 2.5mg once weekly for 4 weeks, then increase to 5mg once weekly.   Metformin and phentermine discussed as alternatives, would likely prescribe both at the same time   Could also consider naltrexone   Could discuss with psychiatrist regarding topiramate in the future   Labs ordered today: lipids, A1c, CMP  Follow up with Anya or Edyta Manzano in 3 months       Anti-obesity medication started today for this patient   ZEPBOUND  Saw great success with saxenda and wegovy prior to pregnancy, cannot restart currently due to supply issues  Not concurrently taking a different GLP-1   No history of pancreatitis   No personal or family history of medullary thyroid carcinoma  No personal or family history of Multiple Endocrine Neoplasia Type 2  On birth control  tubal ligation  .     Potential anti-obesity medications for this patient the future if there are issues with cost or side effects  PHENTERMINE  No history of hypertension  No history of CVA   No history of cardiovascular disease   No history of cardiac arrhythmias   No history of glaucoma  No history of drug abuse  Caution would be needed with this medication due to anxiety, patient knows to monitor   .  NALTREXONE  Might see synergistic effect with the wellbutrin that she's already taking   No history of liver disease  No chronic pain   No current opioid use   .  METFORMIN  Would likely benefit from the insulin sensitivty effects that she was benefiting from with the GLP-1   No history of chronic kidney disease  GFR >45  .    The following medications could be considered with caution for this patient   TOPIRAMATE  Has taken in the past a long time ago as a teenager, cannot recall how she did with this   No history of kidney stones  No  history of glaucoma   No issues with memory  No chronic kidney disease   Caution would be needed with this medication due to mental health concerns, would need to consult with her psychiatrist prior to starting   .         INTERVAL HISTORY:  First seen for New MWM - 10/5/2022 by Cristal Timmons    Started Saxenda, lost 15lbs, last seen 3/1/2023. Saw good weight loss with wegovy.   Became pregnant shortly after, stopped wegovy.     Had baby 2 weeks ago via , healthy baby boy, both mom and baby are doing well. Is interested in restarting weight loss medications, is really hoping to restart wegovy as she saw the most success from this medication and felt that while taking it she was able to sustainably lose weight for the first time in her life.     Not breast feeding at all, expressed understanding that these medications are not safe to be on while breastfeeding.      Wt Readings from Last 5 Encounters:   24 90.7 kg (200 lb)   24 95.3 kg (210 lb)   23 90.7 kg (200 lb)   23 79.4 kg (175 lb)   10/05/22 85.7 kg (189 lb)       Anti-obesity medication history    Current:   None.     Past/Failed/contraindicated:   Wegovy: no side effects   Saxenda: no side effects   Both of these helped with appetite reduction, reducing snacking, resulted in successful weight loss.     Took topiramate a long time ago, would be open to retrying it.       GERD symptoms: none     Constipation/Diarrhea: no issues     Recent sleep changes: taking care of  as well as 2 other kids     Vitamins/Labs: A1c, cmp, lipids ordered     Pregnancy: s/p tubal ligation     CURRENT WEIGHT:   200 lbs 0 oz    Initial Weight (lbs): 189 lbs  Last Visits Weight: 79.4 kg (175 lb)  Cumulative weight loss (lbs): -11  Weight Loss Percentage: -5.82%        2024     9:37 AM   Changes and Difficulties   I have made the following changes to my diet since my last visit: I had a baby. I was definitely eating more before.   With regards  to my diet, I am still struggling with: Right now, so close post-partum I haven't got my appetite back, but typically I overeat.   I have made the following changes to my activity/exercise since my last visit: Definitely went down significantly.   With regards to my activity/exercise, I am still struggling with: Getting out and doing activity. I maintain the house, but not getting to do as much of cardio.             MEDICATIONS:   Current Outpatient Medications   Medication Sig Dispense Refill    buPROPion (WELLBUTRIN XL) 300 MG 24 hr tablet Take 300 mg by mouth every morning Take 300 mg tablet along with 150 mg tablet for total daily dose of 450 mg.      citalopram (CELEXA) 40 MG tablet Take 40 mg by mouth daily      ibuprofen (ADVIL/MOTRIN) 800 MG tablet Take 1 tablet (800 mg) by mouth every 6 hours 30 tablet 0    LORazepam (ATIVAN) 0.5 MG tablet Take 0.5 mg by mouth every 8 hours as needed for anxiety or sleep      tirzepatide-Weight Management (ZEPBOUND) 2.5 MG/0.5ML prefilled pen Inject 0.5 mLs (2.5 mg) Subcutaneous every 7 days For 4 weeks 2 mL 0    tirzepatide-Weight Management (ZEPBOUND) 5 MG/0.5ML prefilled pen Inject 0.5 mLs (5 mg) Subcutaneous every 7 days After completing 4 weeks of taking the 2.5mg dose 2 mL 2    XOPENEX HFA 45 MCG/ACT inhaler Inhale 1-2 puffs into the lungs every 4 hours as needed for wheezing             1/30/2024     9:37 AM   Weight Loss Medication History Reviewed With Patient   Which weight loss medications are you currently taking on a regular basis? None                No data to display              Anti-obesity medication ROS:    HEENT  Hx of glaucoma: No    Cardiovascular  CAD:No  HTN:No      Gastrointestinal  GERD:No yes when pregnant, this has resolved   Constipation:No  Liver Dz:No  H/O Pancreatitis:No    Psychiatric  Bipolar: No  Anxiety:Yes  Depression:Yes sees psychiatrist   History of alcohol/drug abuse: No  Hx of eating disorder:No    Endocrine  Personal or family hx  "of MTC or MEN2:No  Diabetes/prediabetes: No    Neurologic:  Hx of seizures: No  Hx of migraines: No  Memory Impairment: No  CVA history: No        History of kidney stones: No  Kidney disease: No  Current birth control: Yes tubal ligation     Taking Opioid/Narcotic: No      PHYSICAL EXAM:  Objective   Ht 1.499 m (4' 11\")   Wt 90.7 kg (200 lb)   BMI 40.40 kg/m             Vitals:  No vitals were obtained today due to virtual visit.    GENERAL: alert and no distress  EYES: Eyes grossly normal to inspection.  No discharge or erythema, or obvious scleral/conjunctival abnormalities.  RESP: No audible wheeze, cough, or visible cyanosis.    SKIN: Visible skin clear. No significant rash, abnormal pigmentation or lesions.  NEURO: Cranial nerves grossly intact.  Mentation and speech appropriate for age.  PSYCH: Appropriate affect, tone, and pace of words        Sincerely,    Anya Good PA-C      33 minutes spent by me on the date of the encounter doing chart review, history and exam, documentation and further activities per the note    "

## 2024-01-30 NOTE — NURSING NOTE
Chief Complaint   Patient presents with    Follow Up     Return weight management.       Vitals:    01/30/24 0935   Weight: 200 lb       Body mass index is 40.4 kg/m .      Nela Boo, EMT  [facility]

## 2024-01-30 NOTE — PATIENT INSTRUCTIONS
"Thank you for allowing us the privilege of caring for you. We hope we provided you with the excellent service you deserve.   Please let us know if there is anything else we can do for you so that we can be sure you are completely satisfied with your care experience.    To ensure the quality of our services you may be receiving a patient satisfaction survey from an independent patient satisfaction monitoring company.    The greatest compliment you can give is a \"Likely to Recommend\"    Your visit was with Anya Good PA-C today.    Instructions per today's visit:     Vishal Catherine, it was great to visit with you today.  Here is a review of our visit.  If our clinic scheduler is not able to reach you please call [phone] to schedule your next appointments.    Plan  Start Zepbound 2.5mg once weekly for 4 weeks, then increase to 5mg once weekly.   Metformin and low dose phentermine discussed as alternatives, would likely prescribe both at the same time   Could also consider naltrexone   Could discuss with psychiatrist regarding topiramate  Labs ordered today: lipids, A1c, CMP  Follow up with Anya or Edyta Manzano in 3 months       Information about Video Visits with [employer] [email or web address]: video visit information  _________________________________________________________________________________________________________________________________________________________  If you are asked by your clinic team to have your blood pressure checked:  [facility] do offer several locations for blood pressure checks. Please follow the below link to schedule an appointment. Scheduling an appointment at the pharmacy for a blood pressure check is now preferred.    Appointment Plus ([email or web address])  _________________________________________________________________________________________________________________________________________________________  Important contact and scheduling information:  Please call our " contact center at [phone] to schedule your next appointments.  To find a lab location near you, please call [phone].  For any nursing questions or concerns call Cortney White LPN at [phone] or Savi Viera RN at [phone]  Please call during clinic hours Monday through Friday 8:00a - 4:00p if you have questions or you can contact us via Group 47hart at anytime and we will reply during clinic hours.    Lab results will be communicated through My Chart or letter (if My Chart not used). Please call the clinic if you have not received communication after 1 week or if you have any questions.?  Clinic Fax: [phone]    _________________________________________________________________________________________________________________________________________________________  Meal Replacement Products:    Here is the link to our new e-store where you can purchase our meal replacement products    [facility] E-Store  [email or web address]    The one week starter kit is a great way to sample a variety of products and see what works for you.    If you want more information about the product go to: Fresh Grokker    If you are an employee or [facility] or [facility] please contact your care team for a 10% estore discount    Free Shipping for orders over $75     Benefits of meal replacements products:    Portion and calorie control  Improved nutrition  Structured eating  Simplified food choices  Avoid contact with trigger foods  _________________________________________________________________________________________________________________________________________________________  Interested in working with a health ?  Health coaches work with you to improve your overall health and wellbeing.  They look at the whole person, and may involve discussion of different areas of life, including, but not limited to the four pillars of health (sleep,  exercise, nutrition, and stress management). Discuss with your care team if you would like to start working a health .  Health Coaching-3 Pack: Schedule by calling [phone]    $99 for three health coaching visits    Visits may be done in person or via phone    Coaching is a partnership between the  and the client; Coaches do not prescribe or diagnose    Coaching helps inspire the client to reach his/her personal goals   _________________________________________________________________________________________________________________________________________________________  24 Week Healthy Lifestyle Plan:    Our mission in the 24-week Healthy Lifestyle Plan is to provide you with individualized care by giving you the tools, education and support you need to lose weight and maintain a healthy lifestyle. In your 24-week journey, you ll be supported by a dedicated weight loss team that includes registered dietitians, medical weight management providers, health coaches, and nurses -- all with special expertise in weight loss -- to help you every step of the way.     Monthly meetings with your registered dietician or medical weight management provider help to review your progress, update your care plan, and make any adjustments needed to ensure success. Between these visits, weekly and bi-weekly health  visits will help you focus on the four pillars of weight loss -- stress, sleep, nutrition, and exercise -- and how you can best adapt each to achieve sustainable weight loss results.    In addition, you will be given exclusive access to online wellbeing classes through [employer].  Your initial visit will be with a medical weight management provider who will help to understand your weight loss goals and ensure this program is the right fit for you. Please let our team know if you are interested in the 24 week plan by sending a message to your care team or calling [phone] to  schedule.  _________________________________________________________________________________________________________________________________________________________  __________  [facility] Get Moving Program  Our team of physical therapists is trained to help you understand and take control of your condition. They will perform a thorough evaluation to determine your ability for activity and develop a customized plan to fit your goals and physical ability.  Scheduling: Unsure if the Get Moving program is right for you? Discuss the program with your medical provider or diabetes educator. You can also call us at [phone] to ask questions or schedule an appointment.   МАРИНА Get Moving Program  ____________________________________________________________________________________________________________________________________________________________________________   [facility] Diabetes Prevention Program (DPP)  If you have prediabetes and Medicare please contact us via InsideMapst to learn more about the Diabetes Prevention Program (DPP)  Program Details:   [facility] offers the year-long Diabetes Prevention Program (DPP). The program helps you to make lifestyle changes that prevent or delay type 2 diabetes by supporting healthy eating, increased physical activity, stress reduction and use of coping skills.   On average, previous [facility] DPP cohorts have lost and maintained at least 5% of their starting weight throughout the program and averaged more than 150 minutes of physical activity per week.  Participants meet weekly for one-hour group sessions over sixteen weeks, every other week for the next 8 weeks, and monthly for the last six months.   A year-long maintenance program is also available for participants who complete the first year.   Location & Cost:   During the COVID-19 Public Health Emergency, the program is offered virtually. When in-person classes can resume, they  will be held at [facility].  For people with Medicare, the program is covered in full. A self-pay option will also be available for those with non-Medicare insurance plans.   ______________________________________________________________________________________________________________________________________________________________________________________________________________________________    To work with a Behavioral Health Psychologist:    Call to schedule:    Diego Mcclure - [phone]  Katie Palm - [phone]  Franchesca Cruz - [phone]  Heather Lackey - [phone]   Sana Gates PhD (cannot accept Medicare) [phone]        Thank you,   [facility] Comprehensive Weight Management Team

## 2024-01-30 NOTE — TELEPHONE ENCOUNTER
Prior Authorization Approval    Medication: ZEPBOUND 2.5 MG/0.5ML SC SOAJ  Authorization Effective Date: 12/31/2023  Authorization Expiration Date: 1/29/2025  Approved Dose/Quantity: 2 ml per 28 days  Reference #: [id number]   Insurance Company: [employer] - Phone [phone] Fax [phone]  Expected CoPay: $ 0  CoPay Card Available:      Financial Assistance Needed: No  Which Pharmacy is filling the prescription: [facility], [place] - [address]  Pharmacy Notified: Yes  Patient Notified: Yes -  via MyChart         Thank you,     Lee Ramos [facility]  Pharmacy Clinic [facility] [email or web address]   Phone: [phone]  Fax: [phone]

## 2024-01-30 NOTE — TELEPHONE ENCOUNTER
PA Initiation    Medication: ZEPBOUND 2.5 MG/0.5ML SC SOAJ  Insurance Company: [employer] - Phone [phone] Fax [phone]  [facility]illing the Rx[facility] [place] [facility] [place] [facility] [address]  Filling Pharmacy Phone: [phone]  Filling Pharmacy Fax: [phone]  Start Date: 1/30/2024         Thank you,     Lee Ramos [facility]  Pharmacy Clinic [facility]  [email or web address]   Phone: [phone]  Fax: [phone]

## 2024-01-30 NOTE — PROGRESS NOTES
Return Medical Weight Management Note     Fatmata Catherine  MRN:  [id number]  :  1995  VAISHALI:  2024    Dear Physician No Ref-Primary,    I had the pleasure of seeing your patient Fatmata Catherine. She is a 28 year old female who I am continuing to see for treatment of obesity related to:        10/5/2022     9:59 AM   --   I have the following health issues associated with obesity High Blood Pressure    Stress Incontinence   I have the following symptoms associated with obesity Depression    Back Pain    Fatigue    Irregular Menstral Cycle       Assessment & Plan   Problem List Items Addressed This Visit    None  Visit Diagnoses       Class 3 severe obesity with serious comorbidity and body mass index (BMI) of 40.0 to 44.9 in adult, unspecified obesity type (H)    -  Primary    Relevant Medications    tirzepatide-Weight Management (ZEPBOUND) 2.5 MG/0.5ML prefilled pen    tirzepatide-Weight Management (ZEPBOUND) 5 MG/0.5ML prefilled pen    Other Relevant Orders    Comprehensive metabolic panel    Hemoglobin A1c    Lipid panel reflex to direct LDL Fasting           Plan  Start Zepbound 2.5mg once weekly for 4 weeks, then increase to 5mg once weekly.   Metformin and phentermine discussed as alternatives, would likely prescribe both at the same time   Could also consider naltrexone   Could discuss with psychiatrist regarding topiramate in the future   Labs ordered today: lipids, A1c, CMP  Follow up with Anya or Edyta Manzano in 3 months       Anti-obesity medication started today for this patient   ZEPBOUND  Saw great success with saxenda and wegovy prior to pregnancy, cannot restart currently due to supply issues  Not concurrently taking a different GLP-1   No history of pancreatitis   No personal or family history of medullary thyroid carcinoma  No personal or family history of Multiple Endocrine Neoplasia Type 2  On birth control  tubal ligation  .     Potential anti-obesity medications for this patient  the future if there are issues with cost or side effects  PHENTERMINE  No history of hypertension  No history of CVA   No history of cardiovascular disease   No history of cardiac arrhythmias   No history of glaucoma  No history of drug abuse  Caution would be needed with this medication due to anxiety, patient knows to monitor   .  NALTREXONE  Might see synergistic effect with the wellbutrin that she's already taking   No history of liver disease  No chronic pain   No current opioid use   .  METFORMIN  Would likely benefit from the insulin sensitivty effects that she was benefiting from with the GLP-1   No history of chronic kidney disease  GFR >45  .    The following medications could be considered with caution for this patient   TOPIRAMATE  Has taken in the past a long time ago as a teenager, cannot recall how she did with this   No history of kidney stones  No history of glaucoma   No issues with memory  No chronic kidney disease   Caution would be needed with this medication due to mental health concerns, would need to consult with her psychiatrist prior to starting   .         INTERVAL HISTORY:  First seen for [facility] - 10/5/2022 by Cristal Timmons    Started Saxenda, lost 15lbs, last seen 3/1/2023. Saw good weight loss with wegovy.   Became pregnant shortly after, stopped wegovy.     Had baby 2 weeks ago via , healthy baby boy, both mom and baby are doing well. Is interested in restarting weight loss medications, is really hoping to restart wegovy as she saw the most success from this medication and felt that while taking it she was able to sustainably lose weight for the first time in her life.     Not breast feeding at all, expressed understanding that these medications are not safe to be on while breastfeeding.      Wt Readings from Last 5 Encounters:   24 90.7 kg (200 lb)   24 95.3 kg (210 lb)   23 90.7 kg (200 lb)   23 79.4 kg (175 lb)   10/05/22 85.7 kg (189 lb)        Anti-obesity medication history    Current:   None.     Past/Failed/contraindicated:   Wegovy: no side effects   Saxenda: no side effects   Both of these helped with appetite reduction, reducing snacking, resulted in successful weight loss.     Took topiramate a long time ago, would be open to retrying it.       GERD symptoms: none     Constipation/Diarrhea: no issues     Recent sleep changes: taking care of  as well as 2 other kids     Vitamins/Labs: A1c, cmp, lipids ordered     Pregnancy: s/p tubal ligation     CURRENT WEIGHT:   200 lbs 0 oz    Initial Weight (lbs): 189 lbs  Last Visits Weight: 79.4 kg (175 lb)  Cumulative weight loss (lbs): -11  Weight Loss Percentage: -5.82%        2024     9:37 AM   Changes and Difficulties   I have made the following changes to my diet since my last visit: I had a baby. I was definitely eating more before.   With regards to my diet, I am still struggling with: Right now, so close post-partum I haven't got my appetite back, but typically I overeat.   I have made the following changes to my activity/exercise since my last visit: Definitely went down significantly.   With regards to my activity/exercise, I am still struggling with: Getting out and doing activity. I maintain the house, but not getting to do as much of cardio.             MEDICATIONS:   Current Outpatient Medications   Medication Sig Dispense Refill    buPROPion (WELLBUTRIN XL) 300 MG 24 hr tablet Take 300 mg by mouth every morning Take 300 mg tablet along with 150 mg tablet for total daily dose of 450 mg.      citalopram (CELEXA) 40 MG tablet Take 40 mg by mouth daily      ibuprofen (ADVIL/MOTRIN) 800 MG tablet Take 1 tablet (800 mg) by mouth every 6 hours 30 tablet 0    LORazepam (ATIVAN) 0.5 MG tablet Take 0.5 mg by mouth every 8 hours as needed for anxiety or sleep      tirzepatide-Weight Management (ZEPBOUND) 2.5 MG/0.5ML prefilled pen Inject 0.5 mLs (2.5 mg) Subcutaneous every 7 days For 4  "weeks 2 mL 0    tirzepatide-Weight Management (ZEPBOUND) 5 MG/0.5ML prefilled pen Inject 0.5 mLs (5 mg) Subcutaneous every 7 days After completing 4 weeks of taking the 2.5mg dose 2 mL 2    XOPENEX HFA 45 MCG/ACT inhaler Inhale 1-2 puffs into the lungs every 4 hours as needed for wheezing             1/30/2024     9:37 AM   Weight Loss Medication History Reviewed With Patient   Which weight loss medications are you currently taking on a regular basis? None                No data to display              Anti-obesity medication ROS:    HEENT  Hx of glaucoma: No    Cardiovascular  CAD:No  HTN:No      Gastrointestinal  GERD:No yes when pregnant, this has resolved   Constipation:No  Liver Dz:No  H/O Pancreatitis:No    Psychiatric  Bipolar: No  Anxiety:Yes  Depression:Yes sees psychiatrist   History of alcohol/drug abuse: No  Hx of eating disorder:No    Endocrine  Personal or family hx of MTC or MEN2:No  Diabetes/prediabetes: No    Neurologic:  Hx of seizures: No  Hx of migraines: No  Memory Impairment: No  CVA history: No        History of kidney stones: No  Kidney disease: No  Current birth control: Yes tubal ligation     Taking Opioid/Narcotic: No      PHYSICAL EXAM:  Objective    Ht 1.499 m (4' 11\")   Wt 90.7 kg (200 lb)   BMI 40.40 kg/m             Vitals:  No vitals were obtained today due to virtual visit.    GENERAL: alert and no distress  EYES: Eyes grossly normal to inspection.  No discharge or erythema, or obvious scleral/conjunctival abnormalities.  RESP: No audible wheeze, cough, or visible cyanosis.    SKIN: Visible skin clear. No significant rash, abnormal pigmentation or lesions.  NEURO: Cranial nerves grossly intact.  Mentation and speech appropriate for age.  PSYCH: Appropriate affect, tone, and pace of words        Sincerely,    Anya Good PA-C      33 minutes spent by me on the date of the encounter doing chart review, history and exam, documentation and further activities per the note  "

## 2024-04-23 ENCOUNTER — TELEPHONE (OUTPATIENT)
Dept: ENDOCRINOLOGY | Facility: CLINIC | Age: 29
End: 2024-04-23
Payer: COMMERCIAL

## 2024-04-23 DIAGNOSIS — E66.813 CLASS 3 SEVERE OBESITY WITH SERIOUS COMORBIDITY AND BODY MASS INDEX (BMI) OF 40.0 TO 44.9 IN ADULT, UNSPECIFIED OBESITY TYPE (H): Primary | ICD-10-CM

## 2024-04-23 DIAGNOSIS — E66.01 CLASS 3 SEVERE OBESITY WITH SERIOUS COMORBIDITY AND BODY MASS INDEX (BMI) OF 40.0 TO 44.9 IN ADULT, UNSPECIFIED OBESITY TYPE (H): Primary | ICD-10-CM

## 2024-04-23 RX ORDER — SEMAGLUTIDE 0.25 MG/.5ML
0.25 INJECTION, SOLUTION SUBCUTANEOUS WEEKLY
Qty: 2 ML | Refills: 0 | Status: SHIPPED | OUTPATIENT
Start: 2024-04-23 | End: 2024-05-21

## 2024-04-23 RX ORDER — SEMAGLUTIDE 0.5 MG/.5ML
0.5 INJECTION, SOLUTION SUBCUTANEOUS WEEKLY
Qty: 2 ML | Refills: 0 | Status: SHIPPED | OUTPATIENT
Start: 2024-04-23 | End: 2024-05-21

## 2024-04-23 NOTE — TELEPHONE ENCOUNTER
General Call    Contacts         Type Contact Phone/Fax    04/23/2024 10:29 AM CDT Phone (Incoming) Fatmata Catherine RANJIT (Self) [phone] (H)          Reason for Call:  ZEPBOUND    What are your questions or concerns:  Pt is unable to find Zepbound 5 mg and would like a call back regarding a lower dose. Has been without meds for almost 2 months    Could we send this information to you in [employer] or would you prefer to receive a phone call?:   Patient would prefer a phone call   Okay to leave a detailed message?: Yes at Cell number on file:    Telephone Information:   Mobile [phone]

## 2024-04-24 ENCOUNTER — TELEPHONE (OUTPATIENT)
Dept: ENDOCRINOLOGY | Facility: CLINIC | Age: 29
End: 2024-04-24
Payer: COMMERCIAL

## 2024-04-24 NOTE — TELEPHONE ENCOUNTER
Retail Pharmacy Prior Authorization Team   Phone: [phone]    PRIOR AUTHORIZATION DENIED    Medication: WEGOVY 0.25 MG/0.5ML SC SOAJ  Insurance Company: [employer] - Phone [phone] Fax [phone]  Denial Date: 4/24/2024  Denial Reason(s): MUST HAVE TWO OR MORE CLINIC APPTS DISCUSSING WEIGHT LOSS WITHIN THE PAST 6 MONTHS      Appeal Information: IF THE PROVIDER WOULD LIKE TO APPEAL THIS DECISION PLEASE PROVIDE THE PA TEAM WITH A LETTER OF MEDICAL NECESSITY      Patient Notified: NO

## 2024-05-01 NOTE — PROGRESS NOTES
"Video-Visit Details    Type of service:  Video Visit    Video Start Time: 11:36 am  Video End Time: 12:01 pm    Originating Location (pt. Location): Home    Distant Location (provider location):  [facility]NAG[facility]IN[facility] [place]     Platform used for Video Visit: [employer] - Biomolecular Determination    Return Weight Management Nutrition Consultation    Fatmata Catherine is a 29 year old female presents today for a return weight management nutrition consultation.  Patient referred by Cristal Timmons NP on October 5, 2022 and again by Anya Good PA-C 1/30/24.     Patient with Co-morbidities of obesity including:  Type II DM no  Renal Failure no  Sleep apnea no  Hypertension yes   Dyslipidemia no  Joint pain no  Back pain yes  GERD no     Anthropometrics:  Weight 6/8/22: 170 lbs   Weight Nov 2022: 189 lbs   Weight 5/2/24: 185 lbs     Estimated body mass index is 37.35 kg/m  as calculated from the following:    Height as of this encounter: 1.499 m (4' 11.02\").    Weight as of this encounter: 83.9 kg (185 lb).     Current weight: 185 lbs, pt report    Medications for Weight Loss:  None at this time - needs 2 MWM appointments before being able to get Wegovy. Saw Anya recently. Today would be second MWM appointment. Seeing Anya again 5/21 as well.     NUTRITION HISTORY  Food allergies: none  Food intolerances: none  Does not like fish/seafood - textures  Struggles with some textures such as cottage cheese/yogurt/eggs  Does not eat meat off bone  Does not like turkey  Supplements: Vitamin D   Previous methods of diet modification for weight loss: Watching Portions or Calories, Exercise, Weight Watchers, Atkins-type Diet (Low Carb/High Protein), Medications    Per Cristal Timmons NP: Since young child. Had testing done as a child, thyroid etc. Has always struggled with yoyo weight. When 19 was only able to lose weight with working out 3-4hr a day and watching very restrictive. Was able to lose 60lbs, but was hard to sustain. Has " tried other diets with little help. Has been a gradual weight gain over the years.      Highest weight was 215 after pregnancy. Has stayed around 200. Lowest weight of 119lbs.    10/5/22: Pt states she struggles the most with large portions and eating everything on her plate. Also eats very quickly. Eats one large meal throughout the day and the rest is snacking. Only drinks water if it is bottled.     Recently started work again and son is in .     11/9/22: Pt reports she has been doing well but is struggling with new sleep/meal patterns since starting her new job. Often works nights and doesn't get home until 12:30 AM. Struggling with finding a regular sleep schedule and times to eat meals. Often finds herself having a small snack instead of a true meal.      Recent food recall:  Breakfast: cereal with milk  Lunch: soup and sandwich  Dinner: meat and potatoes   Snacks: chips, granola bars, little jonathan cakes, goldfish   Beverages: bottled water  Alcohol: 2-4 times per month  Dining out: at least once a week    May 2024:  First time seeing pt - previously worked with Renetta. Last seen Nov 2022.    Previously was on Saxenda and Wegovy - saw good results but had to stop during pregnancy. Gave birth Jan 2024. Hoping to try Wegovy again. Must work on [facility] first per pt's insurance.      Feels her biggest barrier is constantly being on the go. Hard to find time to prepare a formal meal. Eating lots of bagged frozen pasta or fast food/pizza (kids fav is [employer]. Also goes to [employer]). Kids get breakfast and lunch at school. Picks up around 5:30 so does eat dinner with them.     Does not like eggs, yogurt or cottage cheese - texture wise.     Hydration: diet coke to keep her awake, bottled water   - Reports only drinking bottled water, will     Additional information:  Recently gave birth - Jan 2024.  Not currently breastfeeding     Nutrition Prescription  Recommended energy/nutrient  modification.    Nutrition Diagnosis  Obesity r/t long history of positive energy balance aeb BMI >30.    Nutrition Intervention  Reviewed current dietary habits and pts history   Reviewed and modified previous goals  Answered pt questions  Coordination of care   Nutrition education   AVS and handouts via [employer]    Patient demonstrates understanding.    Expected Engagement: good    Nutrition Resources:    More nutritious options at fast food places:    Adonay    Recommend a bowl and including:   Brown rice and/or black beans - rice/romeo okay but not as nutritious   Meat - chicken will be leaner than beef/pork  Fajita Veggies  Cheese (Recommend limiting)   Salsa  Guac optional (higher in kcal but a good source of heart-healthy fat)     Chick Aamir A  Grilled Chicken [place]   Grilled nuggets   Chick-n-strips  Salad - eat to fullness    Side ideas:  Salad   Fruit cup  Kale Crunch      McDonalds     McChicken, no cheese  Single Cheeseburger   Fish filet  Chicken McNuggets  Considering getting a small garcia vs larger or going without      Faster meal components/ideas  Smoothies  Protein shakes  String cheese  Deli meat  Wraps  [place]   [place] mix  Nuts  Seeds  Beef stick (chomps as an example)  Balanced break or similar (Target, Aldi)   Ready made chicken   Fruit  Vegetables   Ready made grain packs   Could consider yogurt based dressing or olive oil based  Guacamole   Peanut butter   Uncrustables       Protein shake examples:  Boost Max Protein  Ensure Max Protein  Premier Protein  Slim Fast   Muscle Milk  Fairlife Core Power  Elevation (Aldi)      Flavorings to add to water or alternatives to water:   [place]  Crystal light  Lemon  Lime  Orange, cucumber (any fruit or vegetable)   Mint  Propel  Hint water  Low sugar gatorade or powder  Low sugar pedialyte     Previous resources from Renetta:    Easy recipes: https://www.[employer]/recipes/    Low Calorie Frozen Meal:  Healthy Choice Power Bowls  Lean Cuisine  Smart  [facility] Dinners    The Plate Method  [email or web address]    Protein Sources   [email or web address]     Carbohydrates  [email or web address]     Mindful Eating  [email or web address]     Summary of Volumetrics Eating Plan  [email or web address]     Follow-Up:  Wednesday, June 5th at 11:30 am    Time spent with patient: 25 minutes.  Yovana OWEN, RD, LD

## 2024-05-02 ENCOUNTER — VIRTUAL VISIT (OUTPATIENT)
Dept: ENDOCRINOLOGY | Facility: CLINIC | Age: 29
End: 2024-05-02
Payer: COMMERCIAL

## 2024-05-02 VITALS — BODY MASS INDEX: 37.29 KG/M2 | WEIGHT: 185 LBS | HEIGHT: 59 IN

## 2024-05-02 DIAGNOSIS — E66.9 OBESITY: ICD-10-CM

## 2024-05-02 DIAGNOSIS — Z71.3 NUTRITIONAL COUNSELING: Primary | ICD-10-CM

## 2024-05-02 PROCEDURE — 99207 PR NO CHARGE LOS: CPT | Mod: 95 | Performed by: DIETITIAN, REGISTERED

## 2024-05-02 PROCEDURE — 97803 MED NUTRITION INDIV SUBSEQ: CPT | Mod: 95 | Performed by: DIETITIAN, REGISTERED

## 2024-05-02 ASSESSMENT — PAIN SCALES - GENERAL: PAINLEVEL: NO PAIN (0)

## 2024-05-02 NOTE — PATIENT INSTRUCTIONS
Nutrition Resources:    More nutritious options at fast food places:    Adonay    Recommend a bowl and including:   Brown rice and/or black beans - rice/romeo okay but not as nutritious   Meat - chicken will be leaner than beef/pork  Fajita Veggies  Cheese (Recommend limiting)   Salsa  Guac optional (higher in kcal but a good source of heart-healthy fat)     Chick Aamir A  Grilled Chicken [place]   Grilled nuggets   Chick-n-strips  Salad - eat to fullness    Side ideas:  Salad   Fruit cup  Kale Crunch      McDonalds     McChicken, no cheese  Single Cheeseburger   Fish filet  Chicken McNuggets  Considering getting a small garcia vs larger or going without      Faster meal components/ideas  Smoothies  Protein shakes  String cheese  Deli meat  Wraps  May   [place] mix  Nuts  Seeds  Beef stick (chomps as an example)  Balanced break or similar (Target, Aldi)   Ready made chicken   Fruit  Vegetables   Ready made grain packs   Could consider yogurt based dressing or olive oil based  Guacamole   Peanut butter   Uncrustables       Protein shake examples:  Boost Max Protein  Ensure Max Protein  Premier Protein  Slim Fast   Muscle Milk  Fairlife Core Power  Elevation (Aldi)      Flavorings to add to water or alternatives to water:   [place]  Crystal light  Lemon  Lime  Orange, cucumber (any fruit or vegetable)   Mint  Propel  Hint water  Low sugar gatorade or powder  Low sugar pedialyte     Previous resources from Renetta:    Easy recipes: https://www.[employer] [place]/recipes/    Low Calorie Frozen Meal:  Healthy Choice Power Bowls  Lean Cuisine  Smart Ones  Juan Lion  [place] Family Dinners    The Plate Method  [email or web address]    Protein Sources   [email or web address]     Carbohydrates  [email or web address]     Mindful Eating  [email or web address]     Summary of Volumetrics Eating Plan  [email or web address]     Follow-Up:  Wednesday, June 5th at 11:30 am    Yovana Simmons  , LEXIE, RD, LD  Clinic #: [phone]

## 2024-05-02 NOTE — LETTER
"5/2/2024       RE: Fatmata Catherine  [address] [place] [address]     Dear Colleague,    Thank you for referring your patient, Fatmata Catherine, to the [facility] MANAGEMENT [facility] [place] [facility]. Please see a copy of my visit note below.    Video-Visit Details    Type of service:  Video Visit    Video Start Time: 11:36 am  Video End Time: 12:01 pm    Originating Location (pt. Location): Home    Distant Location (provider location):  [facility] WE[facility] MANAGEMENT CLINIC [place]     Platform used for Video Visit: [employer]    Return Weight Management Nutrition Consultation    Fatmata Catherine is a 29 year old female presents today for a return weight management nutrition consultation.  Patient referred by Cristal Timmons NP on October 5, 2022 and again by Anya Good PA-C 1/30/24.     Patient with Co-morbidities of obesity including:  Type II DM no  Renal Failure no  Sleep apnea no  Hypertension yes   Dyslipidemia no  Joint pain no  Back pain yes  GERD no     Anthropometrics:  Weight 6/8/22: 170 lbs   Weight Nov 2022: 189 lbs   Weight 5/2/24: 185 lbs     Estimated body mass index is 37.35 kg/m  as calculated from the following:    Height as of this encounter: 1.499 m (4' 11.02\").    Weight as of this encounter: 83.9 kg (185 lb).     Current weight: 185 lbs, pt report    Medications for Weight Loss:  None at this time - needs 2 MWM appointments before being able to get Wegovy. Saw Anya recently. Today would be second MWM appointment. Seeing Anya again 5/21 as well.     NUTRITION HISTORY  Food allergies: none  Food intolerances: none  Does not like fish/seafood - textures  Struggles with some textures such as cottage cheese/yogurt/eggs  Does not eat meat off bone  Does not like turkey  Supplements: Vitamin D   Previous methods of diet modification for weight loss: Watching Portions or Calories, Exercise, Weight Watchers, " Atkins-type Diet (Low Carb/High Protein), Medications    Per Cristal Timmons, NP: Since young child. Had testing done as a child, thyroid etc. Has always struggled with yoyo weight. When 19 was only able to lose weight with working out 3-4hr a day and watching very restrictive. Was able to lose 60lbs, but was hard to sustain. Has tried other diets with little help. Has been a gradual weight gain over the years.      Highest weight was 215 after pregnancy. Has stayed around 200. Lowest weight of 119lbs.    10/5/22: Pt states she struggles the most with large portions and eating everything on her plate. Also eats very quickly. Eats one large meal throughout the day and the rest is snacking. Only drinks water if it is bottled.     Recently started work again and son is in .     11/9/22: Pt reports she has been doing well but is struggling with new sleep/meal patterns since starting her new job. Often works nights and doesn't get home until 12:30 AM. Struggling with finding a regular sleep schedule and times to eat meals. Often finds herself having a small snack instead of a true meal.      Recent food recall:  Breakfast: cereal with milk  Lunch: soup and sandwich  Dinner: meat and potatoes   Snacks: chips, granola bars, little jonathan cakes, goldfish   Beverages: bottled water  Alcohol: 2-4 times per month  Dining out: at least once a week    May 2024:  First time seeing pt - previously worked with Renetta. Last seen Nov 2022.    Previously was on Saxenda and Wegovy - saw good results but had to stop during pregnancy. Gave birth Jan 2024. Hoping to try Wegovy again. Must work on [facility] first per pt's insurance.      Feels her biggest barrier is constantly being on the go. Hard to find time to prepare a formal meal. Eating lots of bagged frozen pasta or fast food/pizza (kids fav is [employer]. Also goes to [employer]). Kids get breakfast and lunch at school. Picks up around 5:30 so does eat dinner  with them.     Does not like eggs, yogurt or cottage cheese - texture wise.     Hydration: diet coke to keep her awake, bottled water   - Reports only drinking bottled water, will     Additional information:  Recently gave birth - Jan 2024.  Not currently breastfeeding     Nutrition Prescription  Recommended energy/nutrient modification.    Nutrition Diagnosis  Obesity r/t long history of positive energy balance aeb BMI >30.    Nutrition Intervention  Reviewed current dietary habits and pts history   Reviewed and modified previous goals  Answered pt questions  Coordination of care   Nutrition education   AVS and handouts via [employer]    Patient demonstrates understanding.    Expected Engagement: good    Nutrition Resources:    More nutritious options at fast food places:    Adonay    Recommend a bowl and including:   Brown rice and/or black beans - rice/romeo okay but not as nutritious   Meat - chicken will be leaner than beef/pork  Fajita Veggies  Cheese (Recommend limiting)   Salsa  Guac optional (higher in kcal but a good source of heart-healthy fat)     Chick Aamir A  Grilled Chicken [place]   Grilled nuggets   Chick-n-strips  Salad - eat to fullness    Side ideas:  Salad   Fruit cup  Kale Crunch      McDonalds     McChicken, no cheese  Single Cheeseburger   Fish filet  Chicken McNuggets  Considering getting a small garcia vs larger or going without      Faster meal components/ideas  Smoothies  Protein shakes  String cheese  Deli meat  Wraps  [place]   [place] mix  Nuts  Seeds  Beef stick (chomps as an example)  Balanced break or similar (Target, Aldi)   Ready made chicken   Fruit  Vegetables   Ready made grain packs   Could consider yogurt based dressing or olive oil based  Guacamole   Peanut butter   Uncrustables       Protein shake examples:  Boost Max Protein  Ensure Max Protein  Premier Protein  Slim Fast   Muscle Milk  Fairlife Core Power  Elevation (Aldi)      Flavorings to add to water or alternatives to  water:   [place]  Crystal light  Lemon  Lime  Orange, cucumber (any fruit or vegetable)   Mint  Propel  Hint water  Low sugar gatorade or powder  Low sugar pedialyte     Previous resources from Renetta:    Easy recipes: https://www.[employer]/recipes/    Low Calorie Frozen Meal:  Healthy Choice Power Bowls  Lean Cuisine  Smart Ones  Juan Cruz Glengeni  [place] Family Dinners    The Plate Method  Http://[email or web address]    Protein Sources   [email or web address]://[place] [email or web address]     Carbohydrates  [email or web address]     Mindful Eating  [email or web address]://[place] [email or web address]     Summary of Volumetrics Eating Plan  [email or web address]     Follow-Up:  Wednesday, June 5th at 11:30 am    Time spent with patient: 25 minutes.  Yovana OWEN, RD, LD

## 2024-05-21 ENCOUNTER — VIRTUAL VISIT (OUTPATIENT)
Dept: ENDOCRINOLOGY | Facility: CLINIC | Age: 29
End: 2024-05-21
Payer: COMMERCIAL

## 2024-05-21 VITALS — BODY MASS INDEX: 37.29 KG/M2 | HEIGHT: 59 IN | WEIGHT: 185 LBS

## 2024-05-21 DIAGNOSIS — E66.01 CLASS 3 SEVERE OBESITY WITH SERIOUS COMORBIDITY AND BODY MASS INDEX (BMI) OF 40.0 TO 44.9 IN ADULT, UNSPECIFIED OBESITY TYPE (H): Primary | ICD-10-CM

## 2024-05-21 DIAGNOSIS — E66.813 CLASS 3 SEVERE OBESITY WITH SERIOUS COMORBIDITY AND BODY MASS INDEX (BMI) OF 40.0 TO 44.9 IN ADULT, UNSPECIFIED OBESITY TYPE (H): Primary | ICD-10-CM

## 2024-05-21 PROCEDURE — 99214 OFFICE O/P EST MOD 30 MIN: CPT | Mod: 95

## 2024-05-21 ASSESSMENT — PAIN SCALES - GENERAL: PAINLEVEL: NO PAIN (0)

## 2024-05-21 NOTE — LETTER
2024       RE: Fatmata Catherine  [address] [place] [address]     Dear Colleague,    Thank you for referring your patient, Fatmata Catherine, to the [facility] WEIGHT MANAGEMENT CLIN[facility]. Please see a copy of my visit note below.    Virtual Visit Details    Type of service:  Video Visit   Video Start Time:  3:43pm  Video End Time: 3:56pm    Originating Location (pt. Location): Home    Distant Location (provider location):  Off-site  Platform used for Video Visit: [facility] Weight Management Note     Fatmata Catherine  MRN:  [id number]  :  1995  VAISHALI:  2024    Dear Physician No Ref-Primary,    I had the pleasure of seeing your patient Fatmata Catherine. She is a 29 year old female who I am continuing to see for treatment of obesity related to:        10/5/2022     9:59 AM   --   I have the following health issues associated with obesity High Blood Pressure    Stress Incontinence   I have the following symptoms associated with obesity Depression    Back Pain    Fatigue    Irregular Menstral Cycle       Assessment & Plan  Problem List Items Addressed This Visit    None  Visit Diagnoses       Class 3 severe obesity with serious comorbidity and body mass index (BMI) of 40.0 to 44.9 in adult, unspecified obesity type (H)    -  Primary    Relevant Medications    tirzepatide-Weight Management (ZEPBOUND) 2.5 MG/0.5ML prefilled pen    Other Relevant Orders    Lipid panel reflex to direct LDL Fasting    Hemoglobin A1c    Comprehensive metabolic panel    Vitamin D Deficiency    Parathyroid Hormone Intact           Plan  Start Zepbound 2.5mg once weekly for 4 weeks, then increase to 5mg once weekly.  Can consider wegovy in the future if supply issues are ongoing, may have to appeal to insurance (was denied in the past due to requiring 2 visits with weight management providers over the last 6 months, which  Fatmata has since completed)  Goals we discussed today: continuing to cut down on fast food, getting to the gym a few times a week   Labs ordered today: A1c, lipids, cmp, vitamin d, pth  Follow up with Anya in 3 months   Dietician appointment: Yovana 24  Keep up the excellent work!       INTERVAL HISTORY:  First seen for New MWM - 10/5/2022 by Cristal Timmons    Started Saxenda, lost 15lbs, last seen 3/1/2023. Saw good weight loss with wegovy.   Became pregnant shortly after, stopped wegovy.      New to me 24  Had baby 2 weeks ago via , healthy baby boy, both mom and baby are doing well. Is interested in restarting weight loss medications, is really hoping to restart wegovy as she saw the most success from this medication and felt that while taking it she was able to sustainably lose weight for the first time in her life.      Not breast feeding at all, expressed understanding that these medications are not safe to be on while breastfeeding.      Since last visit   -started zepbound, stopped due to supply issues  -switched to wegovy, insurance denied, requiring 2 visits with weight management over the last 6 months  -starting appeal for wegovy today (24)    ED 24- dysuria, started keflex 500mg Bid x 7 days     Saw 15lbs weight loss since last visit, though hasn't weighed herself since stopping zepbound.     Just picked up zepbound 5mg, hasn't started it yet. Discussed the importance of restarting zepbound 2.5mg for 4 weeks first to reduce risk for side effects.     Wt Readings from Last 5 Encounters:   24 83.9 kg (185 lb)   24 83.9 kg (185 lb)   24 90.7 kg (200 lb)   24 95.3 kg (210 lb)   23 90.7 kg (200 lb)       Anti-obesity medication history    Current:   None     Past/Failed/contraindicated:   Zepbound- felt good, no side effects, had to stop due to shortages. Just received the 5mg dose in the mail.     Recent diet changes: Working on cutting down on fast food.      Recent exercise/activity changes: returning to work, has a gym onsite, hopes to use it while going to work     Recent stressors: returning to work    Vitamins/Labs: cmp, lipids, vitamin d, pth, A1c     Pregnancy: s/p tubal ligation    CURRENT WEIGHT:   184 lbs 15.98 oz    Initial Weight (lbs): 189 lbs  Last Visits Weight: 83.9 kg (185 lb)  Cumulative weight loss (lbs): 4  Weight Loss Percentage: 2.12%        5/21/2024     3:31 PM   Changes and Difficulties   I have made the following changes to my diet since my last visit: none   I have made the following changes to my activity/exercise since my last visit: none             MEDICATIONS:   Current Outpatient Medications   Medication Sig Dispense Refill    buPROPion (WELLBUTRIN XL) 300 MG 24 hr tablet Take 300 mg by mouth every morning Take 300 mg tablet along with 150 mg tablet for total daily dose of 450 mg.      citalopram (CELEXA) 40 MG tablet Take 40 mg by mouth daily      ibuprofen (ADVIL/MOTRIN) 800 MG tablet Take 1 tablet (800 mg) by mouth every 6 hours 30 tablet 0    LORazepam (ATIVAN) 0.5 MG tablet Take 0.5 mg by mouth every 8 hours as needed for anxiety or sleep      tirzepatide-Weight Management (ZEPBOUND) 2.5 MG/0.5ML prefilled pen Inject 0.5 mLs (2.5 mg) Subcutaneous every 7 days For 4 weeks 2 mL 2    tirzepatide-Weight Management (ZEPBOUND) 5 MG/0.5ML prefilled pen Inject 0.5 mLs (5 mg) Subcutaneous every 7 days After completing 4 weeks of taking the 2.5mg dose 2 mL 2    XOPENEX HFA 45 MCG/ACT inhaler Inhale 1-2 puffs into the lungs every 4 hours as needed for wheezing      Semaglutide-Weight Management (WEGOVY) 0.25 MG/0.5ML pen Inject 0.25 mg Subcutaneous once a week For 4 weeks 2 mL 0    Semaglutide-Weight Management (WEGOVY) 0.5 MG/0.5ML pen Inject 0.5 mg Subcutaneous once a week After completing 4 weeks of 0.25mg dose 2 mL 0           5/21/2024     3:31 PM   Weight Loss Medication History Reviewed With Patient   Are you having any side effects  "from the weight loss medication that we have prescribed you? No                No data to display                  PHYSICAL EXAM:  Objective   Ht 1.499 m (4' 11.02\")   Wt 83.9 kg (185 lb)   BMI 37.34 kg/m      Vitals - Patient Reported  Pain Score: No Pain (0)      Vitals:  No vitals were obtained today due to virtual visit.    GENERAL: alert and no distress  EYES: Eyes grossly normal to inspection.  No discharge or erythema, or obvious scleral/conjunctival abnormalities.  RESP: No audible wheeze, cough, or visible cyanosis.    SKIN: Visible skin clear. No significant rash, abnormal pigmentation or lesions.  NEURO: Cranial nerves grossly intact.  Mentation and speech appropriate for age.  PSYCH: Appropriate affect, tone, and pace of words        Sincerely,    Anya Good PA-C      37 minutes spent by me on the date of the encounter doing chart review, history and exam, documentation and further activities per the note  "

## 2024-05-21 NOTE — NURSING NOTE
Is the patient currently in the state of [place]? YES    Visit mode:VIDEO    If the visit is dropped, the patient can be reconnected by: VIDEO VISIT: Text to cell phone:   Telephone Information:   Mobile [phone]       Will anyone else be joining the visit? NO  (If patient encounters technical issues they should call [phone])    How would you like to obtain your AVS? MyChart    Are changes needed to the allergy or medication list? No    Are refills needed on medications prescribed by this physician? NO    Reason for visit: RECHUSMAN LEWIS      
yes

## 2024-05-21 NOTE — PROGRESS NOTES
Virtual Visit Details    Type of service:  Video Visit   Video Start Time:  3:43pm  Video End Time: 3:56pm    Originating Location (pt. Location): Home    Distant Location (provider location):  Off-site  Platform used for Video Visit: [facility] Weight Management Note     Fatmata Catherine  MRN:  [id number]  :  1995  VAISHALI:  2024    Dear Physician No Ref-Primary,    I had the pleasure of seeing your patient Fatmata Catherine. She is a 29 year old female who I am continuing to see for treatment of obesity related to:        10/5/2022     9:59 AM   --   I have the following health issues associated with obesity High Blood Pressure    Stress Incontinence   I have the following symptoms associated with obesity Depression    Back Pain    Fatigue    Irregular Menstral Cycle       Assessment & Plan   Problem List Items Addressed This Visit    None  Visit Diagnoses       Class 3 severe obesity with serious comorbidity and body mass index (BMI) of 40.0 to 44.9 in adult, unspecified obesity type (H)    -  Primary    Relevant Medications    tirzepatide-Weight Management (ZEPBOUND) 2.5 MG/0.5ML prefilled pen    Other Relevant Orders    Lipid panel reflex to direct LDL Fasting    Hemoglobin A1c    Comprehensive metabolic panel    Vitamin D Deficiency    Parathyroid Hormone Intact           Plan  Start Zepbound 2.5mg once weekly for 4 weeks, then increase to 5mg once weekly.  Can consider wegovy in the future if supply issues are ongoing, may have to appeal to insurance (was denied in the past due to requiring 2 visits with weight management providers over the last 6 months, which Fatmata has since completed)  Goals we discussed today: continuing to cut down on fast food, getting to the gym a few times a week   Labs ordered today: A1c, lipids, cmp, vitamin d, pth  Follow up with Anya in 3 months   Dietician appointment: Yovana 24  Keep up the excellent work!       INTERVAL HISTORY:  First seen for New  YAJAIRA - 10/5/2022 by Cristal Timmons    Started Saxenda, lost 15lbs, last seen 3/1/2023. Saw good weight loss with wegovy.   Became pregnant shortly after, stopped wegovy.      New to me 24  Had baby 2 weeks ago via , healthy baby boy, both mom and baby are doing well. Is interested in restarting weight loss medications, is really hoping to restart wegovy as she saw the most success from this medication and felt that while taking it she was able to sustainably lose weight for the first time in her life.      Not breast feeding at all, expressed understanding that these medications are not safe to be on while breastfeeding.      Since last visit   -started zepbound, stopped due to supply issues  -switched to wegovy, insurance denied, requiring 2 visits with weight management over the last 6 months  -starting appeal for wegovy today (24)    ED 24- dysuria, started keflex 500mg Bid x 7 days     Saw 15lbs weight loss since last visit, though hasn't weighed herself since stopping zepbound.     Just picked up zepbound 5mg, hasn't started it yet. Discussed the importance of restarting zepbound 2.5mg for 4 weeks first to reduce risk for side effects.     Wt Readings from Last 5 Encounters:   24 83.9 kg (185 lb)   24 83.9 kg (185 lb)   24 90.7 kg (200 lb)   24 95.3 kg (210 lb)   23 90.7 kg (200 lb)       Anti-obesity medication history    Current:   None     Past/Failed/contraindicated:   Zepbound- felt good, no side effects, had to stop due to shortages. Just received the 5mg dose in the mail.     Recent diet changes: Working on cutting down on fast food.     Recent exercise/activity changes: returning to work, has a gym onsite, hopes to use it while going to work     Recent stressors: returning to work    Vitamins/Labs: cmp, lipids, vitamin d, pth, A1c     Pregnancy: s/p tubal ligation    CURRENT WEIGHT:   184 lbs 15.98 oz    Initial Weight (lbs): 189 lbs  Last Visits Weight:  "83.9 kg (185 lb)  Cumulative weight loss (lbs): 4  Weight Loss Percentage: 2.12%        5/21/2024     3:31 PM   Changes and Difficulties   I have made the following changes to my diet since my last visit: none   I have made the following changes to my activity/exercise since my last visit: none             MEDICATIONS:   Current Outpatient Medications   Medication Sig Dispense Refill    buPROPion (WELLBUTRIN XL) 300 MG 24 hr tablet Take 300 mg by mouth every morning Take 300 mg tablet along with 150 mg tablet for total daily dose of 450 mg.      citalopram (CELEXA) 40 MG tablet Take 40 mg by mouth daily      ibuprofen (ADVIL/MOTRIN) 800 MG tablet Take 1 tablet (800 mg) by mouth every 6 hours 30 tablet 0    LORazepam (ATIVAN) 0.5 MG tablet Take 0.5 mg by mouth every 8 hours as needed for anxiety or sleep      tirzepatide-Weight Management (ZEPBOUND) 2.5 MG/0.5ML prefilled pen Inject 0.5 mLs (2.5 mg) Subcutaneous every 7 days For 4 weeks 2 mL 2    tirzepatide-Weight Management (ZEPBOUND) 5 MG/0.5ML prefilled pen Inject 0.5 mLs (5 mg) Subcutaneous every 7 days After completing 4 weeks of taking the 2.5mg dose 2 mL 2    XOPENEX HFA 45 MCG/ACT inhaler Inhale 1-2 puffs into the lungs every 4 hours as needed for wheezing      Semaglutide-Weight Management (WEGOVY) 0.25 MG/0.5ML pen Inject 0.25 mg Subcutaneous once a week For 4 weeks 2 mL 0    Semaglutide-Weight Management (WEGOVY) 0.5 MG/0.5ML pen Inject 0.5 mg Subcutaneous once a week After completing 4 weeks of 0.25mg dose 2 mL 0           5/21/2024     3:31 PM   Weight Loss Medication History Reviewed With Patient   Are you having any side effects from the weight loss medication that we have prescribed you? No                No data to display                  PHYSICAL EXAM:  Objective    Ht 1.499 m (4' 11.02\")   Wt 83.9 kg (185 lb)   BMI 37.34 kg/m      Vitals - Patient Reported  Pain Score: No Pain (0)      Vitals:  No vitals were obtained today due to virtual " visit.    GENERAL: alert and no distress  EYES: Eyes grossly normal to inspection.  No discharge or erythema, or obvious scleral/conjunctival abnormalities.  RESP: No audible wheeze, cough, or visible cyanosis.    SKIN: Visible skin clear. No significant rash, abnormal pigmentation or lesions.  NEURO: Cranial nerves grossly intact.  Mentation and speech appropriate for age.  PSYCH: Appropriate affect, tone, and pace of words        Sincerely,    Anya Good PA-C      37 minutes spent by me on the date of the encounter doing chart review, history and exam, documentation and further activities per the note

## 2024-05-21 NOTE — NURSING NOTE
Is the patient currently in the state of [place]? YES    Visit mode:VIDEO    If the visit is dropped, the patient can be reconnected by: VIDEO VISIT: Text to cell phone:   Telephone Information:   Mobile [phone]       Will anyone else be joining the visit? NO  (If patient encounters technical issues they should call [phone]    How would you like to obtain your AVS? MyChart    Are changes needed to the allergy or medication list? No    Are refills needed on medications prescribed by this physician? NO    Reason for visit: RECHUSMAN LEWIS

## 2024-05-21 NOTE — PATIENT INSTRUCTIONS
"Thank you for allowing us the privilege of caring for you. We hope we provided you with the excellent service you deserve.   Please let us know if there is anything else we can do for you so that we can be sure you are completely satisfied with your care experience.    To ensure the quality of our services you may be receiving a patient satisfaction survey from an independent patient satisfaction monitoring company.    The greatest compliment you can give is a \"Likely to Recommend\"    Your visit was with Anya Good PA-C today.    Instructions per today's visit:     Vishal Catherine, it was great to visit with you today.  Here is a review of our visit.  If our clinic scheduler is not able to reach you please call [phone] to schedule your next appointments.    Plan  Start Zepbound 2.5mg once weekly for 4 weeks, then increase to 5mg once weekly.  Can consider wegovy in the future if supply issues are ongoing, may have to appeal to insurance (was denied in the past due to requiring 2 visits with weight management providers over the last 6 months, which Fatmata has since completed)  Goals we discussed today: continuing to cut down on fast food, getting to the gym a few times a week   Labs ordered today: A1c, lipids, cmp, vitamin d, pth  Follow up with Anya in 3 months   Dietician appointment: Yovana 6/5/24  Keep up the excellent work!       Information about Video Visits with [facility]: video visit information  _________________________________________________________________________________________________________________________________________________________  If you are asked by your clinic team to have your blood pressure checked:  [facility] do offer several locations for blood pressure checks. Please follow the below link to schedule an appointment. Scheduling an appointment at the pharmacy for a blood pressure check is now preferred.    Appointment Plus " ([email or web address])  _________________________________________________________________________________________________________________________________________________________  Important contact and scheduling information:  Please call our contact center at [phone] to schedule your next appointments.  To find a lab location near you, please call [phone].  For any nursing questions or concerns call Cortney White LPN at [phone] or Savi Viera RN at [phone]  Please call during clinic hours Monday through Friday 8:00a - 4:00p if you have questions or you can contact us via [employer] at anytime and we will reply during clinic hours.    Lab results will be communicated through My Chart or letter (if My Chart not used). Please call the clinic if you have not received communication after 1 week or if you have any questions.?  Clinic Fax: [phone]    _________________________________________________________________________________________________________________________________________________________  Meal Replacement Products:    Here is the link to our new e-store where you can purchase our meal replacement products    [facility] E-Store  [email or web address]    The one week starter kit is a great way to sample a variety of products and see what works for you.    If you want more information about the product go to: [employer] Meals  [email or web address]    If you are an employee or [facility] or [facility] please contact your care team for a 10% estore discount    Free Shipping for orders over $75     Benefits of meal replacements products:    Portion and calorie control  Improved nutrition  Structured eating  Simplified food choices  Avoid contact with trigger foods  _________________________________________________________________________________________________________________________________________________________  Interested in working with a health  ?  Health coaches work with you to improve your overall health and wellbeing.  They look at the whole person, and may involve discussion of different areas of life, including, but not limited to the four pillars of health (sleep, exercise, nutrition, and stress management). Discuss with your care team if you would like to start working a health .  Health Coaching-3 Pack: Schedule by calling [phone]    $99 for three health coaching visits    Visits may be done in person or via phone    Coaching is a partnership between the  and the client; Coaches do not prescribe or diagnose    Coaching helps inspire the client to reach his/her personal goals   _________________________________________________________________________________________________________________________________________________________  24 Week Healthy Lifestyle Plan:    Our mission in the 24-week Healthy Lifestyle Plan is to provide you with individualized care by giving you the tools, education and support you need to lose weight and maintain a healthy lifestyle. In your 24-week journey, you ll be supported by a dedicated weight loss team that includes registered dietitians, medical weight management providers, health coaches, and nurses -- all with special expertise in weight loss -- to help you every step of the way.     Monthly meetings with your registered dietician or medical weight management provider help to review your progress, update your care plan, and make any adjustments needed to ensure success. Between these visits, weekly and bi-weekly health  visits will help you focus on the four pillars of weight loss -- stress, sleep, nutrition, and exercise -- and how you can best adapt each to achieve sustainable weight loss results.    In addition, you will be given exclusive access to online wellbeing classes through "[employer].  Your initial visit will be with a medical weight management provider who will help to  understand your weight loss goals and ensure this program is the right fit for you. Please let our team know if you are interested in the 24 week plan by sending a message to your care team or calling [phone] to schedule.  _________________________________________________________________________________________________________________________________________________________  __________  [facility] Get Moving Program  Our team of physical therapists is trained to help you understand and take control of your condition. They will perform a thorough evaluation to determine your ability for activity and develop a customized plan to fit your goals and physical ability.  Scheduling: Unsure if the Get Moving program is right for you? Discuss the program with your medical provider or diabetes educator. You can also call us at [phone] to ask questions or schedule an appointment.   [facility] Get Moving Program  ____________________________________________________________________________________________________________________________________________________________________________  [facility] Diabetes Prevention Program (DPP)  If you have prediabetes and Medicare please contact us via [employer] to learn more about the Diabetes Prevention Program (DPP)  Program Details:   [facility] offers the year-long Diabetes Prevention Program (DPP). The program helps you to make lifestyle changes that prevent or delay type 2 diabetes by supporting healthy eating, increased physical activity, stress reduction and use of coping skills.   On average, previous [facility] DPP cohorts have lost and maintained at least 5% of their starting weight throughout the program and averaged more than 150 minutes of physical activity per week.  Participants meet weekly for one-hour group sessions over sixteen weeks, every other week for the next 8 weeks, and monthly for the last six months.   A year-long  maintenance program is also available for participants who complete the first year.   Location & Cost:   During the COVID-19 Public Health Emergency, the program is offered virtually. When in-person classes can resume, they will be held at [facility].  For people with Medicare, the program is covered in full. A self-pay option will also be available for those with non-Medicare insurance plans.   ______________________________________________________________________________________________________________________________________________________________________________________________________________________________    To work with a Behavioral Health Psychologist:    Call to schedule:    Diego Mcclure - [phone]  Katie Paml - [phone]  Franchesca Cruz - [phone]  Heather Lackey - [phone]   Sana Gates PhD (cannot accept Medicare) [phone]        Thank you,   [facility] Comprehensive Weight Management Team

## 2024-05-24 ENCOUNTER — TELEPHONE (OUTPATIENT)
Dept: ENDOCRINOLOGY | Facility: CLINIC | Age: 29
End: 2024-05-24
Payer: COMMERCIAL

## 2024-05-24 NOTE — TELEPHONE ENCOUNTER
Left Voicemail (1st Attempt) and Sent Mychart (1st Attempt) for the patient to call back and schedule the following:    Appointment type: [facility]   Provider: Azra Good PA-C  Return date: 3 mos   Specialty phone number: [phone]  Additional appointment(s) needed: no   Additonal Notes: no

## 2024-06-06 ENCOUNTER — TRANSCRIBE ORDERS (OUTPATIENT)
Dept: OTHER | Age: 29
End: 2024-06-06

## 2024-06-06 DIAGNOSIS — Z76.89 REFERRAL OF PATIENT: ICD-10-CM

## 2024-07-19 DIAGNOSIS — E66.01 CLASS 3 SEVERE OBESITY WITH SERIOUS COMORBIDITY AND BODY MASS INDEX (BMI) OF 40.0 TO 44.9 IN ADULT, UNSPECIFIED OBESITY TYPE (H): ICD-10-CM

## 2024-07-19 DIAGNOSIS — E66.813 CLASS 3 SEVERE OBESITY WITH SERIOUS COMORBIDITY AND BODY MASS INDEX (BMI) OF 40.0 TO 44.9 IN ADULT, UNSPECIFIED OBESITY TYPE (H): ICD-10-CM

## 2024-07-23 NOTE — TELEPHONE ENCOUNTER
tirzepatide-Weight Management (ZEPBOUND) 5 MG/0.5ML prefilled pen 2 mL 2 1/30/2024     Last Office Visit: 5/21/24  Future Office visit:   none    Routing refill request to provider for review/approval because:  Drug not on the [facility], [facility] or [facility] refill protocol or controlled substance    Lolis Oliver RN  [facility] Red Flag Triage/MRT

## 2024-08-08 DIAGNOSIS — E66.01 CLASS 3 SEVERE OBESITY WITH SERIOUS COMORBIDITY AND BODY MASS INDEX (BMI) OF 40.0 TO 44.9 IN ADULT, UNSPECIFIED OBESITY TYPE (H): ICD-10-CM

## 2024-08-08 DIAGNOSIS — E66.813 CLASS 3 SEVERE OBESITY WITH SERIOUS COMORBIDITY AND BODY MASS INDEX (BMI) OF 40.0 TO 44.9 IN ADULT, UNSPECIFIED OBESITY TYPE (H): ICD-10-CM

## 2024-08-08 NOTE — TELEPHONE ENCOUNTER
Medication Requested:  tirzepatide-Weight Management (ZEPBOUND) 2.5 MG/0.5ML prefilled pen 2 mL 2 5/21/2024 -- No   Sig - Route: Inject 0.5 mLs (2.5 mg) Subcutaneous every 7 days For 4 weeks - Subcutaneous     ----------------------  Last Office Visit : 5/21/2024  [facility] [place]      Future Office visit:  0  ----------------------      Refill decision: Refill pended and routed to the provider for review/determination due to the following criteria not met:     Medication not on WM/Bariatric refill protocol    Received refill request for Zepbound. Patient needs appointment scheduled prior to any refills. Clinic Coordinator notified and will follow up with the patient as appropriate. The pharmacy has been notified that the medication will not be refilled prior to an appointment being scheduled.

## 2024-09-12 ENCOUNTER — VIRTUAL VISIT (OUTPATIENT)
Dept: ENDOCRINOLOGY | Facility: CLINIC | Age: 29
End: 2024-09-12
Payer: COMMERCIAL

## 2024-09-12 VITALS — BODY MASS INDEX: 33.26 KG/M2 | HEIGHT: 59 IN | WEIGHT: 165 LBS

## 2024-09-12 DIAGNOSIS — E66.01 CLASS 3 SEVERE OBESITY WITH SERIOUS COMORBIDITY AND BODY MASS INDEX (BMI) OF 40.0 TO 44.9 IN ADULT, UNSPECIFIED OBESITY TYPE (H): ICD-10-CM

## 2024-09-12 DIAGNOSIS — E66.813 CLASS 3 SEVERE OBESITY WITH SERIOUS COMORBIDITY AND BODY MASS INDEX (BMI) OF 40.0 TO 44.9 IN ADULT, UNSPECIFIED OBESITY TYPE (H): ICD-10-CM

## 2024-09-12 PROCEDURE — G2211 COMPLEX E/M VISIT ADD ON: HCPCS | Mod: 95

## 2024-09-12 PROCEDURE — 99212 OFFICE O/P EST SF 10 MIN: CPT | Mod: 95

## 2024-09-12 ASSESSMENT — PAIN SCALES - GENERAL: PAINLEVEL: NO PAIN (0)

## 2024-09-12 NOTE — PATIENT INSTRUCTIONS
"Thank you for allowing us the privilege of caring for you. We hope we provided you with the excellent service you deserve.   Please let us know if there is anything else we can do for you so that we can be sure you are completely satisfied with your care experience.    To ensure the quality of our services you may be receiving a patient satisfaction survey from an independent patient satisfaction monitoring company.    The greatest compliment you can give is a \"Likely to Recommend\"    Your visit was with Anya Good PA-C today.    Instructions per today's visit:     Vishal Catherine, it was great to visit with you today.  Here is a review of our visit.  If our clinic scheduler is not able to reach you please call [phone] to schedule your next appointments.    Plan  Continue zepbound 5mg for the time being, Fatmata can reach out over mychart between our appointments if wanting to discuss dose adjustments   Goals we discussed today:   Continuing to prioritize protein- goal of 60g minimum   64oz water daily   Labs ordered at previous appointment, discussed getting these done   Follow up with Anya in 3 months   Dietician appointment as needed moving forward   Keep up the excellent work!       Information about Video Visits with [employer]: video visit information  _________________________________________________________________________________________________________________________________________________________  If you are asked by your clinic team to have your blood pressure checked:  [facility] do offer several locations for blood pressure checks. Please follow the below link to schedule an appointment. Scheduling an appointment at the pharmacy for a blood pressure check is now preferred.    Appointment Plus " ([email or web address])  _________________________________________________________________________________________________________________________________________________________  Important contact and scheduling information:  Please call our contact center at [phone] to schedule your next appointments.  To find a lab location near you, please call [phone].  For any nursing questions or concerns call Cortney White LPN at [phone] or Savi Viera RN at [phone]  Please call during clinic hours Monday through Friday 8:00a - 4:00p if you have questions or you can contact us via [employer] at anytime and we will reply during clinic hours.    Lab results will be communicated through My Chart or letter (if My Chart not used). Please call the clinic if you have not received communication after 1 week or if you have any questions.?  Clinic Fax: [phone]    _________________________________________________________________________________________________________________________________________________________  Meal Replacement Products:    Here is the link to our new e-store where you can purchase our meal replacement products    [facility] E-Store  [facility].[employer]/store    The one week starter kit is a great way to sample a variety of products and see what works for you.    If you want more information about the product go to: [employer] Meals  [employer]    If you are an employee or [facility] or [facility] please contact your care team for a 10% estore discount    Free Shipping for orders over $75     Benefits of meal replacements products:    Portion and calorie control  Improved nutrition  Structured eating  Simplified food choices  Avoid contact with trigger foods  _________________________________________________________________________________________________________________________________________________________  Interested in working with a health  ?  Health coaches work with you to improve your overall health and wellbeing.  They look at the whole person, and may involve discussion of different areas of life, including, but not limited to the four pillars of health (sleep, exercise, nutrition, and stress management). Discuss with your care team if you would like to start working a health .  Health Coaching-3 Pack: Schedule by calling [phone]    $99 for three health coaching visits    Visits may be done in person or via phone    Coaching is a partnership between the  and the client; Coaches do not prescribe or diagnose    Coaching helps inspire the client to reach his/her personal goals   _________________________________________________________________________________________________________________________________________________________  24 Week Healthy Lifestyle Plan:    Our mission in the 24-week Healthy Lifestyle Plan is to provide you with individualized care by giving you the tools, education and support you need to lose weight and maintain a healthy lifestyle. In your 24-week journey, you ll be supported by a dedicated weight loss team that includes registered dietitians, medical weight management providers, health coaches, and nurses -- all with special expertise in weight loss -- to help you every step of the way.     Monthly meetings with your registered dietician or medical weight management provider help to review your progress, update your care plan, and make any adjustments needed to ensure success. Between these visits, weekly and bi-weekly health  visits will help you focus on the four pillars of weight loss -- stress, sleep, nutrition, and exercise -- and how you can best adapt each to achieve sustainable weight loss results.    In addition, you will be given exclusive access to online wellbeing classes through [employer].  Your initial visit will be with a medical weight management provider who will help to  understand your weight loss goals and ensure this program is the right fit for you. Please let our team know if you are interested in the 24 week plan by sending a message to your care team or calling [phone] to schedule.  _________________________________________________________________________________________________________________________________________________________  __________  [facility] Get Moving Program  Our team of physical therapists is trained to help you understand and take control of your condition. They will perform a thorough evaluation to determine your ability for activity and develop a customized plan to fit your goals and physical ability.  Scheduling: Unsure if the Get Moving program is right for you? Discuss the program with your medical provider or diabetes educator. You can also call us at [phone] to ask questions or schedule an appointment.   [facility] Get Moving Program  ____________________________________________________________________________________________________________________________________________________________________________  [facility] Diabetes Prevention Program (DPP)  If you have prediabetes and Medicare please contact us via [employer] to learn more about the Diabetes Prevention Program (DPP)  Program Details:   [employer] [facility] offers the year-long Diabetes Prevention Program (DPP). The program helps you to make lifestyle changes that prevent or delay type 2 diabetes by supporting healthy eating, increased physical activity, stress reduction and use of coping skills.   On average, previous [facility] DPP cohorts have lost and maintained at least 5% of their starting weight throughout the program and averaged more than 150 minutes of physical activity per week.  Participants meet weekly for one-hour group sessions over sixteen weeks, every other week for the next 8 weeks, and monthly for the last six months.   A year-long  maintenance program is also available for participants who complete the first year.   Location & Cost:   During the COVID-19 Public Health Emergency, the program is offered virtually. When in-person classes can resume, they will be held at [facility].  For people with Medicare, the program is covered in full. A self-pay option will also be available for those with non-Medicare insurance plans.   ______________________________________________________________________________________________________________________________________________________________________________________________________________________________    To work with a Behavioral Health Psychologist:    Call to schedule:    Diego Mcclure - [phone]  Katie Palm - [phone]  Franchesca Cruz - [phone]  Heather Lackey - [phone]   Sana Gates PhD (cannot accept Medicare) [phone]        Thank you,   [facility] Comprehensive Weight Management Team

## 2024-09-12 NOTE — NURSING NOTE
Current patient location:  [place]    Is the patient currently in the state of [place]? YES    Visit mode:VIDEO    If the visit is dropped, the patient can be reconnected by: VIDEO VISIT: Text to cell phone:   Telephone Information:   Mobile [phone]       Will anyone else be joining the visit? NO  (If patient encounters technical issues they should call [phone] :[phone])    How would you like to obtain your AVS? MyChart    Are changes needed to the allergy or medication list? No    Are refills needed on medications prescribed by this physician? YES    Rooming Documentation:  Questionnaire(s) completed      Reason for visit: RECHUSMAN LEWIS

## 2024-09-12 NOTE — PROGRESS NOTES
Virtual Visit Details    Type of service:  Video Visit   Video Start Time: 11:35 AM  Video End Time:11:47 AM    Originating Location (pt. Location): Home    Distant Location (provider location):  Off-site  Platform used for Video Visit: [facility] Medical Weight Management Note     Fatmata Catherine  MRN:  [id number]  :  1995  VAISHALI:  2024    Dear Physician No Ref-Primary,    I had the pleasure of seeing your patient Fatmata Catherine. She is a 29 year old female who I am continuing to see for treatment of obesity related to:        10/5/2022     9:59 AM   --   I have the following health issues associated with obesity High Blood Pressure    Stress Incontinence   I have the following symptoms associated with obesity Depression    Back Pain    Fatigue    Irregular Menstral Cycle       Assessment & Plan   Problem List Items Addressed This Visit    None  Visit Diagnoses       Class 3 severe obesity with serious comorbidity and body mass index (BMI) of 40.0 to 44.9 in adult, unspecified obesity type (H)        Relevant Medications    tirzepatide-Weight Management (ZEPBOUND) 5 MG/0.5ML prefilled pen           Plan  Continue zepbound 5mg for the time being, Fatmata can reach out over mychart between our appointments if wanting to discuss dose adjustments   Goals we discussed today:   Continuing to prioritize protein- goal of 60g minimum   64oz water daily   Labs ordered at previous appointment, discussed getting these done   Follow up with Anya in 3 months   Dietician appointment as needed moving forward   Keep up the excellent work!         INTERVAL HISTORY:  First seen for [facility] - 10/5/2022 by Cristal Timmons    Started Saxenda, lost 15lbs, last seen 3/1/2023. Saw good weight loss with wegovy.   Became pregnant shortly after, stopped wegovy.      New to me 24  Had baby 2 weeks ago via , healthy baby boy, both mom and baby are doing well. Is interested in restarting weight loss medications,  is really hoping to restart wegovy as she saw the most success from this medication and felt that while taking it she was able to sustainably lose weight for the first time in her life.      Not breast feeding at all, expressed understanding that these medications are not safe to be on while breastfeeding.       Last seen by me 5/21/24  -started zepbound, stopped due to supply issues  -switched to wegovy, insurance denied, requiring 2 visits with weight management over the last 6 months  -starting appeal for wegovy today (5/21/24)    ED 5/8/24- dysuria, started keflex 500mg Bid x 7 days      Saw 15lbs weight loss since last visit, though hasn't weighed herself since stopping zepbound.      Just picked up zepbound 5mg, hasn't started it yet. Discussed the importance of restarting zepbound 2.5mg for 4 weeks first to reduce risk for side effects.     -has since increased to zepbound 5mg      Today in visit (9/12/24)   20lbs weight loss since last visit     Improved energy with weight loss, feeling good with this weight loss.     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 This has been a stressful change but also helpful for her as she is now a lot more active in taking him to walls every day.      A couple of ED visits over the last month due to UTI concerns and some headache concerns. Doing her best to stay up with hydration, wonders if the UTI issues are related to a recent change in body.   Wt Readings from Last 5 Encounters:   09/12/24 74.8 kg (165 lb)   05/21/24 83.9 kg (185 lb)   05/02/24 83.9 kg (185 lb)   01/30/24 90.7 kg (200 lb)   01/12/24 95.3 kg (210 lb)       Anti-obesity medication history    Current:   Zepbound 5mg- no side effects. Helpful with reducing snacking, portion control during meals.     Past/Failed/contraindicated:   Wegovy: no side effects   Saxenda: no side effects   Both of these helped with  appetite reduction, reducing snacking, resulted in successful weight loss.      Took topiramate a long time ago- caution with retrialing due to mental health, would want to confirm with psychiatrist prior       GERD symptoms: only if going too long without eating     Constipation/Diarrhea: none     Recent diet changes: no major changes aside from smaller portions    Protein - prioritizes. Getting lots of chicken. Cutting down on red meat. Discussed sampling some protein shakes    Recent exercise/activity changes: more outdoor activities with son, 2.5 year old son is not in  so they're going to the park.     Recent stressors: a little higher with taking care of 2.5 year old son exclusively. Has good family support. Taking leave of absence from work to support son.     Recent sleep changes: improved. less fatigue throughout the day.     Vitamins/Labs: labs ordered at previous visit, discussed getting these done     Pregnancy: s/p tubal ligation     CURRENT WEIGHT:   165 lbs 0 oz    Initial Weight (lbs): 189 lbs  Last Visits Weight: 83.9 kg (185 lb)  Cumulative weight loss (lbs): 24  Weight Loss Percentage: 12.7%        9/12/2024    11:22 AM   Changes and Difficulties   I have made the following changes to my diet since my last visit: none   I have made the following changes to my activity/exercise since my last visit: none             MEDICATIONS:   Current Outpatient Medications   Medication Sig Dispense Refill    buPROPion (WELLBUTRIN XL) 300 MG 24 hr tablet Take 300 mg by mouth every morning Take 300 mg tablet along with 150 mg tablet for total daily dose of 450 mg.      citalopram (CELEXA) 40 MG tablet Take 40 mg by mouth daily      ibuprofen (ADVIL/MOTRIN) 800 MG tablet Take 1 tablet (800 mg) by mouth every 6 hours 30 tablet 0    LORazepam (ATIVAN) 0.5 MG tablet Take 0.5 mg by mouth every 8 hours as needed for anxiety or sleep      tirzepatide-Weight Management (ZEPBOUND) 2.5 MG/0.5ML prefilled pen Inject  "0.5 mLs (2.5 mg) Subcutaneous every 7 days For 4 weeks 2 mL 2    tirzepatide-Weight Management (ZEPBOUND) 5 MG/0.5ML prefilled pen Inject 0.5 mLs (5 mg) subcutaneously every 7 days. 2 mL 2    XOPENEX HFA 45 MCG/ACT inhaler Inhale 1-2 puffs into the lungs every 4 hours as needed for wheezing             9/12/2024    11:22 AM   Weight Loss Medication History Reviewed With Patient   Are you having any side effects from the weight loss medication that we have prescribed you? No                No data to display                  PHYSICAL EXAM:  Objective    Ht 1.499 m (4' 11.02\")   Wt 74.8 kg (165 lb)   BMI 33.31 kg/m      Vitals - Patient Reported  Pain Score: No Pain (0)      Vitals:  No vitals were obtained today due to virtual visit.    GENERAL: alert and no distress  EYES: Eyes grossly normal to inspection.  No discharge or erythema, or obvious scleral/conjunctival abnormalities.  RESP: No audible wheeze, cough, or visible cyanosis.    SKIN: Visible skin clear. No significant rash, abnormal pigmentation or lesions.  NEURO: Cranial nerves grossly intact.  Mentation and speech appropriate for age.  PSYCH: Appropriate affect, tone, and pace of words        Sincerely,    Anya Good PA-C      19 minutes spent by me on the date of the encounter doing chart review, history and exam, documentation and further activities per the note    The longitudinal plan of care for the diagnosis(es)/condition(s) as documented were addressed during this visit. Due to the added complexity in care, I will continue to support Fatmata in the subsequent management and with ongoing continuity of care.   "

## 2024-09-12 NOTE — LETTER
2024       RE: Fatmata Catherine  [address] [place] [address]     Dear Colleague,    Thank you for referring your patient, Fatmata Catherine, to the [facility] WEIGHT MANAGEMENT CLINIC [facility]. Please see a copy of my visit note below.    Virtual Visit Details    Type of service:  Video Visit   Video Start Time: 11:35 AM  Video End Time:11:47 AM    Originating Location (pt. Location): Home    Distant Location (provider location):  Off-site  Platform used for Video Visit: [facility] Medical Weight Management Note     Fatmata Catherine  MRN:  [id number]  :  1995  VAISHALI:  2024    Dear Physician No Ref-Primary,    I had the pleasure of seeing your patient Fatmata Catherine. She is a 29 year old female who I am continuing to see for treatment of obesity related to:        10/5/2022     9:59 AM   --   I have the following health issues associated with obesity High Blood Pressure    Stress Incontinence   I have the following symptoms associated with obesity Depression    Back Pain    Fatigue    Irregular Menstral Cycle       Assessment & Plan  Problem List Items Addressed This Visit    None  Visit Diagnoses       Class 3 severe obesity with serious comorbidity and body mass index (BMI) of 40.0 to 44.9 in adult, unspecified obesity type (H)        Relevant Medications    tirzepatide-Weight Management (ZEPBOUND) 5 MG/0.5ML prefilled pen           Plan  Continue zepbound 5mg for the time being, Fatmata can reach out over mychart between our appointments if wanting to discuss dose adjustments   Goals we discussed today:   Continuing to prioritize protein- goal of 60g minimum   64oz water daily   Labs ordered at previous appointment, discussed getting these done   Follow up with Anya in 3 months   Dietician appointment as needed moving forward   Keep up the excellent work!         INTERVAL HISTORY:  First seen for New MWM -  10/5/2022 by Cristal Timmons    Started Saxenda, lost 15lbs, last seen 3/1/2023. Saw good weight loss with wegovy.   Became pregnant shortly after, stopped wegovy.      New to me 24  Had baby 2 weeks ago via , healthy baby boy, both mom and baby are doing well. Is interested in restarting weight loss medications, is really hoping to restart wegovy as she saw the most success from this medication and felt that while taking it she was able to sustainably lose weight for the first time in her life.      Not breast feeding at all, expressed understanding that these medications are not safe to be on while breastfeeding.       Last seen by me 24  -started zepbound, stopped due to supply issues  -switched to wegovy, insurance denied, requiring 2 visits with weight management over the last 6 months  -starting appeal for wegovy today (24)    ED 24- dysuria, started keflex 500mg Bid x 7 days      Saw 15lbs weight loss since last visit, though hasn't weighed herself since stopping zepbound.      Just picked up zepbound 5mg, hasn't started it yet. Discussed the importance of restarting zepbound 2.5mg for 4 weeks first to reduce risk for side effects.     -has since increased to zepbound 5mg      Today in visit (24)   20lbs weight loss since last visit     Improved energy with weight loss, feeling good with this weight loss.     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 This has been a stressful change but also helpful for her as she is now a lot more active in taking him to walls every day.      A couple of ED visits over the last month due to UTI concerns and some headache concerns. Doing her best to stay up with hydration, wonders if the UTI issues are related to a recent change in body.   Wt Readings from Last 5 Encounters:   24 74.8 kg (165 lb)   24 83.9 kg (185 lb)   24 83.9 kg  (185 lb)   01/30/24 90.7 kg (200 lb)   01/12/24 95.3 kg (210 lb)       Anti-obesity medication history    Current:   Zepbound 5mg- no side effects. Helpful with reducing snacking, portion control during meals.     Past/Failed/contraindicated:   Wegovy: no side effects   Saxenda: no side effects   Both of these helped with appetite reduction, reducing snacking, resulted in successful weight loss.      Took topiramate a long time ago- caution with retrialing due to mental health, would want to confirm with psychiatrist prior       GERD symptoms: only if going too long without eating     Constipation/Diarrhea: none     Recent diet changes: no major changes aside from smaller portions    Protein - prioritizes. Getting lots of chicken. Cutting down on red meat. Discussed sampling some protein shakes    Recent exercise/activity changes: more outdoor activities with son, 2.5 year old son is not in  so they're going to the park.     Recent stressors: a little higher with taking care of 2.5 year old son exclusively. Has good family support. Taking leave of absence from work to support son.     Recent sleep changes: improved. less fatigue throughout the day.     Vitamins/Labs: labs ordered at previous visit, discussed getting these done     Pregnancy: s/p tubal ligation     CURRENT WEIGHT:   165 lbs 0 oz    Initial Weight (lbs): 189 lbs  Last Visits Weight: 83.9 kg (185 lb)  Cumulative weight loss (lbs): 24  Weight Loss Percentage: 12.7%        9/12/2024    11:22 AM   Changes and Difficulties   I have made the following changes to my diet since my last visit: none   I have made the following changes to my activity/exercise since my last visit: none             MEDICATIONS:   Current Outpatient Medications   Medication Sig Dispense Refill     buPROPion (WELLBUTRIN XL) 300 MG 24 hr tablet Take 300 mg by mouth every morning Take 300 mg tablet along with 150 mg tablet for total daily dose of 450 mg.       citalopram  "(CELEXA) 40 MG tablet Take 40 mg by mouth daily       ibuprofen (ADVIL/MOTRIN) 800 MG tablet Take 1 tablet (800 mg) by mouth every 6 hours 30 tablet 0     LORazepam (ATIVAN) 0.5 MG tablet Take 0.5 mg by mouth every 8 hours as needed for anxiety or sleep       tirzepatide-Weight Management (ZEPBOUND) 2.5 MG/0.5ML prefilled pen Inject 0.5 mLs (2.5 mg) Subcutaneous every 7 days For 4 weeks 2 mL 2     tirzepatide-Weight Management (ZEPBOUND) 5 MG/0.5ML prefilled pen Inject 0.5 mLs (5 mg) subcutaneously every 7 days. 2 mL 2     XOPENEX HFA 45 MCG/ACT inhaler Inhale 1-2 puffs into the lungs every 4 hours as needed for wheezing             9/12/2024    11:22 AM   Weight Loss Medication History Reviewed With Patient   Are you having any side effects from the weight loss medication that we have prescribed you? No                No data to display                  PHYSICAL EXAM:  Objective   Ht 1.499 m (4' 11.02\")   Wt 74.8 kg (165 lb)   BMI 33.31 kg/m      Vitals - Patient Reported  Pain Score: No Pain (0)      Vitals:  No vitals were obtained today due to virtual visit.    GENERAL: alert and no distress  EYES: Eyes grossly normal to inspection.  No discharge or erythema, or obvious scleral/conjunctival abnormalities.  RESP: No audible wheeze, cough, or visible cyanosis.    SKIN: Visible skin clear. No significant rash, abnormal pigmentation or lesions.  NEURO: Cranial nerves grossly intact.  Mentation and speech appropriate for age.  PSYCH: Appropriate affect, tone, and pace of words        Sincerely,    Anya Good PA-C      19 minutes spent by me on the date of the encounter doing chart review, history and exam, documentation and further activities per the note    The longitudinal plan of care for the diagnosis(es)/condition(s) as documented were addressed during this visit. Due to the added complexity in care, I will continue to support Fatmata in the subsequent management and with ongoing continuity of care. "       Again, thank you for allowing me to participate in the care of your patient.      Sincerely,    Anya Good PA-C

## 2024-09-20 ENCOUNTER — TELEPHONE (OUTPATIENT)
Dept: ENDOCRINOLOGY | Facility: CLINIC | Age: 29
End: 2024-09-20
Payer: COMMERCIAL

## 2024-09-20 NOTE — TELEPHONE ENCOUNTER
Patient confirmed scheduled appointment:  Date: 1/15/25  Time: 1030am  Visit type: return [facility]  Provider: ky cramer

## 2024-10-04 DIAGNOSIS — E66.813 CLASS 3 SEVERE OBESITY WITH SERIOUS COMORBIDITY AND BODY MASS INDEX (BMI) OF 40.0 TO 44.9 IN ADULT, UNSPECIFIED OBESITY TYPE (H): Primary | ICD-10-CM

## 2024-10-04 DIAGNOSIS — E66.01 CLASS 3 SEVERE OBESITY WITH SERIOUS COMORBIDITY AND BODY MASS INDEX (BMI) OF 40.0 TO 44.9 IN ADULT, UNSPECIFIED OBESITY TYPE (H): Primary | ICD-10-CM

## 2024-11-15 ENCOUNTER — TELEPHONE (OUTPATIENT)
Dept: ENDOCRINOLOGY | Facility: CLINIC | Age: 29
End: 2024-11-15
Payer: COMMERCIAL

## 2024-11-15 NOTE — TELEPHONE ENCOUNTER
General Call    Contacts       Contact Date/Time Type Contact Phone/Fax    11/15/2024 02:22 PM CST Phone (Incoming) Florin Fatmata RANJIT (Self) [phone] (H)          Reason for Call:  Please call pt today, as not feeling well after needing to stop her ZEPBOUND for a week and now restarting        Could we send this information to you in [facility] or would you prefer to receive a phone call?:   Patient would prefer a phone call   Okay to leave a detailed message?: Yes at Cell number on file:    Telephone Information:   Mobile [phone]

## 2024-11-15 NOTE — TELEPHONE ENCOUNTER
Patient is taking zepbound 7.5 mg. Missed a dose because she had the flu so restarted after being off for a week took her injection last night after eating very greasy fried chicken and is now nauseated.   Would like zofran for the nausea. Never had side effects on 7.5 mg dose before.   Will inform Anya Good PA-C of above.

## 2024-11-21 ENCOUNTER — HOSPITAL ENCOUNTER (OUTPATIENT)
Facility: CLINIC | Age: 29
Setting detail: OBSERVATION
Discharge: STILL ADMITTED OR ADMINISTRATIVE | End: 2024-11-22
Attending: EMERGENCY MEDICINE | Admitting: EMERGENCY MEDICINE
Payer: COMMERCIAL

## 2024-11-21 DIAGNOSIS — F41.9 ANXIETY: ICD-10-CM

## 2024-11-21 DIAGNOSIS — R45.4 IRRITABILITY: ICD-10-CM

## 2024-11-21 DIAGNOSIS — F32.A DEPRESSION, UNSPECIFIED DEPRESSION TYPE: ICD-10-CM

## 2024-11-21 PROCEDURE — 99222 1ST HOSP IP/OBS MODERATE 55: CPT | Performed by: EMERGENCY MEDICINE

## 2024-11-21 PROCEDURE — 99285 EMERGENCY DEPT VISIT HI MDM: CPT | Performed by: EMERGENCY MEDICINE

## 2024-11-21 ASSESSMENT — COLUMBIA-SUICIDE SEVERITY RATING SCALE - C-SSRS
2. HAVE YOU ACTUALLY HAD ANY THOUGHTS OF KILLING YOURSELF IN THE PAST MONTH?: NO
6. HAVE YOU EVER DONE ANYTHING, STARTED TO DO ANYTHING, OR PREPARED TO DO ANYTHING TO END YOUR LIFE?: NO
1. IN THE PAST MONTH, HAVE YOU WISHED YOU WERE DEAD OR WISHED YOU COULD GO TO SLEEP AND NOT WAKE UP?: NO

## 2024-11-21 ASSESSMENT — ACTIVITIES OF DAILY LIVING (ADL)
ADLS_ACUITY_SCORE: 0
ADLS_ACUITY_SCORE: 0

## 2024-11-22 ENCOUNTER — HOSPITAL ENCOUNTER (INPATIENT)
Facility: CLINIC | Age: 29
LOS: 2 days | Discharge: HOME | DRG: 885 | End: 2024-11-24
Attending: PSYCHIATRY & NEUROLOGY | Admitting: PSYCHIATRY & NEUROLOGY
Payer: COMMERCIAL

## 2024-11-22 VITALS
RESPIRATION RATE: 16 BRPM | DIASTOLIC BLOOD PRESSURE: 74 MMHG | HEIGHT: 59 IN | WEIGHT: 150 LBS | SYSTOLIC BLOOD PRESSURE: 108 MMHG | BODY MASS INDEX: 30.24 KG/M2 | HEART RATE: 81 BPM | TEMPERATURE: 98.3 F | OXYGEN SATURATION: 97 %

## 2024-11-22 DIAGNOSIS — E66.01 CLASS 3 SEVERE OBESITY WITH SERIOUS COMORBIDITY AND BODY MASS INDEX (BMI) OF 40.0 TO 44.9 IN ADULT, UNSPECIFIED OBESITY TYPE (H): ICD-10-CM

## 2024-11-22 DIAGNOSIS — F33.2 MDD (MAJOR DEPRESSIVE DISORDER), RECURRENT SEVERE, WITHOUT PSYCHOSIS (H): Primary | ICD-10-CM

## 2024-11-22 DIAGNOSIS — E66.813 CLASS 3 SEVERE OBESITY WITH SERIOUS COMORBIDITY AND BODY MASS INDEX (BMI) OF 40.0 TO 44.9 IN ADULT, UNSPECIFIED OBESITY TYPE (H): ICD-10-CM

## 2024-11-22 PROBLEM — R45.851 SUICIDAL IDEATION: Status: ACTIVE | Noted: 2024-11-22

## 2024-11-22 PROBLEM — F32.A DEPRESSION, UNSPECIFIED DEPRESSION TYPE: Status: ACTIVE | Noted: 2024-11-22

## 2024-11-22 PROBLEM — F41.9 ANXIETY: Status: ACTIVE | Noted: 2024-11-22

## 2024-11-22 PROBLEM — R45.4 IRRITABILITY: Status: ACTIVE | Noted: 2024-11-22

## 2024-11-22 LAB
AMPHETAMINES UR QL SCN: NORMAL
BARBITURATES UR QL SCN: NORMAL
BENZODIAZ UR QL SCN: NORMAL
BZE UR QL SCN: NORMAL
CANNABINOIDS UR QL SCN: NORMAL
FENTANYL UR QL: NORMAL
OPIATES UR QL SCN: NORMAL
PCP QUAL URINE (ROCHE): NORMAL

## 2024-11-22 PROCEDURE — 124N000002 HC R&B MH UMMC

## 2024-11-22 PROCEDURE — 99418 PROLNG IP/OBS E/M EA 15 MIN: CPT | Performed by: NURSE PRACTITIONER

## 2024-11-22 PROCEDURE — 80307 DRUG TEST PRSMV CHEM ANLYZR: CPT | Performed by: EMERGENCY MEDICINE

## 2024-11-22 PROCEDURE — G0378 HOSPITAL OBSERVATION PER HR: HCPCS

## 2024-11-22 PROCEDURE — 250N000013 HC RX MED GY IP 250 OP 250 PS 637: Performed by: NURSE PRACTITIONER

## 2024-11-22 PROCEDURE — 250N000013 HC RX MED GY IP 250 OP 250 PS 637: Performed by: PSYCHIATRY & NEUROLOGY

## 2024-11-22 PROCEDURE — 99223 1ST HOSP IP/OBS HIGH 75: CPT | Performed by: NURSE PRACTITIONER

## 2024-11-22 RX ORDER — ONDANSETRON 4 MG/1
4 TABLET, FILM COATED ORAL EVERY 8 HOURS PRN
Status: DISCONTINUED | OUTPATIENT
Start: 2024-11-22 | End: 2024-11-24 | Stop reason: HOSPADM

## 2024-11-22 RX ORDER — ONDANSETRON 4 MG/1
4 TABLET, FILM COATED ORAL EVERY 8 HOURS PRN
COMMUNITY

## 2024-11-22 RX ORDER — ALBUTEROL SULFATE 90 UG/1
2 INHALANT RESPIRATORY (INHALATION) EVERY 6 HOURS PRN
COMMUNITY

## 2024-11-22 RX ORDER — TRAZODONE HYDROCHLORIDE 50 MG/1
50 TABLET, FILM COATED ORAL
Status: DISCONTINUED | OUTPATIENT
Start: 2024-11-22 | End: 2024-11-24 | Stop reason: HOSPADM

## 2024-11-22 RX ORDER — MAGNESIUM HYDROXIDE/ALUMINUM HYDROXICE/SIMETHICONE 120; 1200; 1200 MG/30ML; MG/30ML; MG/30ML
30 SUSPENSION ORAL EVERY 4 HOURS PRN
Status: DISCONTINUED | OUTPATIENT
Start: 2024-11-22 | End: 2024-11-24 | Stop reason: HOSPADM

## 2024-11-22 RX ORDER — METHYLPHENIDATE HYDROCHLORIDE 20 MG/1
20 CAPSULE, EXTENDED RELEASE ORAL DAILY
COMMUNITY

## 2024-11-22 RX ORDER — HYDROXYZINE HYDROCHLORIDE 25 MG/1
25 TABLET, FILM COATED ORAL EVERY 4 HOURS PRN
Status: DISCONTINUED | OUTPATIENT
Start: 2024-11-22 | End: 2024-11-24 | Stop reason: HOSPADM

## 2024-11-22 RX ORDER — ACETAMINOPHEN 325 MG/1
650 TABLET ORAL EVERY 4 HOURS PRN
Status: DISCONTINUED | OUTPATIENT
Start: 2024-11-22 | End: 2024-11-24 | Stop reason: HOSPADM

## 2024-11-22 RX ORDER — METHYLPHENIDATE HYDROCHLORIDE 20 MG/1
20 CAPSULE, EXTENDED RELEASE ORAL DAILY
Status: DISCONTINUED | OUTPATIENT
Start: 2024-11-23 | End: 2024-11-23

## 2024-11-22 RX ORDER — LEVALBUTEROL TARTRATE 45 UG/1
1-2 AEROSOL, METERED ORAL EVERY 4 HOURS PRN
Status: DISCONTINUED | OUTPATIENT
Start: 2024-11-22 | End: 2024-11-22 | Stop reason: ALTCHOICE

## 2024-11-22 RX ORDER — OLANZAPINE 10 MG/2ML
10 INJECTION, POWDER, FOR SOLUTION INTRAMUSCULAR 3 TIMES DAILY PRN
Status: DISCONTINUED | OUTPATIENT
Start: 2024-11-22 | End: 2024-11-24 | Stop reason: HOSPADM

## 2024-11-22 RX ORDER — CITALOPRAM HYDROBROMIDE 10 MG/1
40 TABLET ORAL DAILY
Status: DISCONTINUED | OUTPATIENT
Start: 2024-11-23 | End: 2024-11-23

## 2024-11-22 RX ORDER — AMOXICILLIN 250 MG
1 CAPSULE ORAL 2 TIMES DAILY PRN
Status: DISCONTINUED | OUTPATIENT
Start: 2024-11-22 | End: 2024-11-24 | Stop reason: HOSPADM

## 2024-11-22 RX ORDER — LORAZEPAM 0.5 MG/1
0.5 TABLET ORAL DAILY PRN
Status: DISCONTINUED | OUTPATIENT
Start: 2024-11-22 | End: 2024-11-24 | Stop reason: HOSPADM

## 2024-11-22 RX ORDER — OLANZAPINE 10 MG/1
10 TABLET ORAL 3 TIMES DAILY PRN
Status: DISCONTINUED | OUTPATIENT
Start: 2024-11-22 | End: 2024-11-24 | Stop reason: HOSPADM

## 2024-11-22 RX ORDER — ALBUTEROL SULFATE 90 UG/1
2 INHALANT RESPIRATORY (INHALATION) EVERY 6 HOURS PRN
Status: DISCONTINUED | OUTPATIENT
Start: 2024-11-22 | End: 2024-11-24 | Stop reason: HOSPADM

## 2024-11-22 RX ORDER — BUPROPION HYDROCHLORIDE 150 MG/1
150 TABLET ORAL DAILY
Status: DISCONTINUED | OUTPATIENT
Start: 2024-11-22 | End: 2024-11-22 | Stop reason: HOSPADM

## 2024-11-22 RX ADMIN — TRAZODONE HYDROCHLORIDE 50 MG: 50 TABLET ORAL at 21:36

## 2024-11-22 RX ADMIN — BUPROPION HYDROCHLORIDE 150 MG: 150 TABLET, EXTENDED RELEASE ORAL at 14:54

## 2024-11-22 RX ADMIN — FLUOXETINE 20 MG: 10 CAPSULE ORAL at 14:54

## 2024-11-22 RX ADMIN — TRAZODONE HYDROCHLORIDE 50 MG: 50 TABLET ORAL at 23:28

## 2024-11-22 RX ADMIN — HYDROXYZINE HYDROCHLORIDE 25 MG: 25 TABLET, FILM COATED ORAL at 23:28

## 2024-11-22 NOTE — PROGRESS NOTES
Pt reports increased anxiety from being in the hallway and thinking of leaving AMA. Pt agreeable to stay if placed in a room. No available rooms at this time, Charge RN aware. Pt declined offered comfort measures. After further discussion, pt agreeable to stay at this time, writer will continue to round and provide updates to pt.

## 2024-11-22 NOTE — PLAN OF CARE
Fatmata Catherine  November 22, 2024  Plan of Care Hand-off Note     Patient Care Path: observation    Plan for Care:   It is the recommendation of this provider that pt remain under observation with extended care to receive psychiatric consultation prior to determining if additional services/referrals are needed prior to discharge (e.g., a DA for IOP, PHP, ADT). Pt denies SI, NSSIB, HI, substance use, and hallucinations, but endorses some experiences of dissociation. Pt reports current OP services and coping skills are insufficient/inadequate for worsening anxiety and stressors. Pt denied an ability to remain compliant on medications and does not anticipate meeting again with her prescriber for c. 2 weeks. Pt expressed a preference for voluntary IPMH while needing to find a balance between caring for her own MH/needs with being present for her family.    Identified Goals and Safety Issues: Psychiatric consultation for medication management; extended care to determine appropriate resources after time in observation (e.g., a DA for PHP, IOP, ADT), as pt appears unlikely to voluntarily admit for IPMH as preferred and states current OP services are inadequate for concerns.    Overview:  Bety Catherine ([facility] [place]): [phone]      Legal Status: Legal Status at Admission: Voluntary/Patient has signed consent for treatment    Psychiatry Consult: ordered     Updated attending physician and RN regarding plan of care.      Agustin Rodgers, Psychotherapist Trainee
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
## 2024-11-22 NOTE — ED PROVIDER NOTES
"ED Provider Note  [facility]             This note should also serve as OBSERVATION H&P    History     Chief Complaint   Patient presents with    Anxiety     Reports increasing anxiety, emotion, and anger. Pt states has not able be taking home meds for over 3 weeks due not remembering to take meds. Pt is here for psych eval. Has 3 kids and significant others. Pt has DBT program which pt attends x2 week at [facility].      HPI  Fatmata Catherine is a 29 year old female PMH of BPD, ADHD, depression, anxiety presents to the ED stating that her mental health is been off for the last few weeks.  She states she really has not taken any of her mental health meds at all in the last 3 weeks, or very little.  She states that she has all sorts of excuses, but realistically just is not taking them.  She states that she has been very irritable over that timeframe, arguing very frequently other family, yelling a lot.  She states is not a good situation for her family, feels very unhealthy.  She states she has a fiancé and 3 young children.  She does state that she yells a lot.  There is no thoughts of hurting herself or hurting anyone else, but she has had impulsive thoughts of throwing things-but not really with any thought of harming anyone.  She is not suicidal.  She does not have any auditory visual hallucinations.  She does not use substances.  She states she does have a psychiatrist, therapist whom she saw last week, and goes to DBT once weekly.  She states she has discussed her issues with a therapist and with DBT and they encouraged her to consider inpatient.  No acute physical complaints this time.\"    Past Medical History  Past Medical History:   Diagnosis Date    Anxiety     Depressive disorder      Past Surgical History:   Procedure Laterality Date    COMBINED  SECTION, SALPINGECTOMY BILATERAL Bilateral 2024    Procedure: REPEAT  SECTION WITH BILATERAL " "SALPINGECTOMY;  Surgeon: Yamel Martino DO;  Location: [facility] Main OR     buPROPion (WELLBUTRIN XL) 300 MG 24 hr tablet  citalopram (CELEXA) 40 MG tablet  ibuprofen (ADVIL/MOTRIN) 800 MG tablet  LORazepam (ATIVAN) 0.5 MG tablet  tirzepatide-Weight Management (ZEPBOUND) 2.5 MG/0.5ML prefilled pen  tirzepatide-Weight Management (ZEPBOUND) 5 MG/0.5ML prefilled pen  tirzepatide-Weight Management (ZEPBOUND) 7.5 MG/0.5ML prefilled pen  XOPENEX HFA 45 MCG/ACT inhaler      Allergies   Allergen Reactions    Codeine Itching, Hives and Nausea     Family History  No family history on file.  Social History   Social History     Tobacco Use    Smoking status: Never    Smokeless tobacco: Never   Substance Use Topics    Alcohol use: Yes     Comment: Alcoholic Drinks/day: occasional    Drug use: Not Currently     Types: Marijuana      A medically appropriate review of systems was performed with pertinent positives and negatives noted in the HPI, and all other systems negative.    Physical Exam   BP: 108/81  Pulse: 98  Temp: 98  F (36.7  C)  Resp: 16  Height: 149.9 cm (4' 11\")  Weight: 68 kg (150 lb)  SpO2: 98 %  Physical Exam  Constitutional:       General: She is not in acute distress.     Appearance: Normal appearance. She is not toxic-appearing.   HENT:      Head: Atraumatic.   Eyes:      General: No scleral icterus.     Conjunctiva/sclera: Conjunctivae normal.   Cardiovascular:      Rate and Rhythm: Normal rate.      Heart sounds: Normal heart sounds.   Pulmonary:      Effort: No respiratory distress.      Breath sounds: Normal breath sounds.   Abdominal:      Palpations: Abdomen is soft.      Tenderness: There is no abdominal tenderness.   Musculoskeletal:         General: No deformity.      Cervical back: Neck supple.   Skin:     General: Skin is warm.      Findings: No rash.   Neurological:      Mental Status: She is alert.           ED Course, Procedures, & Data      Procedures              No results found for any " visits on 11/21/24.  Medications - No data to display  Labs Ordered and Resulted from Time of ED Arrival to Time of ED Departure - No data to display  No orders to display          Critical care was not performed.     Medical Decision Making  The patient's presentation was of high complexity (a chronic illness severe exacerbation, progression, or side effect of treatment).    The patient's evaluation involved:  review of external note(s) from 1 sources (previous note)  discussion of management or test interpretation with another health professional (DEC )    The patient's management necessitated high risk (a decision regarding hospitalization).    Assessment & Plan    The patient states that things have not been going well at home the last few weeks.  She has been very irritable, arguing a lot with family, yelling a lot with her kids.  No suicidal or homicidal ideation though.  No hallucinations.  She denies substance use.  She admits that she has not been compliant with her normal home meds. The mental health  saw her. She also reported that the kids have behavioral concerns, and her partner is without work, amongst other stressors. Pt is interested in inpt, but  doesn't feel she meets criteria for this. He's recommended observation in the ED and a psychiatry consult. The pt had reported some OCD tendencies as a barrier to her taking her meds.  Health  is recommending observation in the emergency department for psychiatry consult tomorrow.  The patient is agreeable.  Orders were placed for this.  She will be signed out pending this.    Dictation Disclaimer: Some of this Note has been completed with voice-recognition dictation software. Although errors are generally corrected real-time, there is the potential for a rare error to be present in the completed chart.      I have reviewed the nursing notes. I have reviewed the findings, diagnosis, plan and need for follow up with the  patient.    New Prescriptions    No medications on file       Final diagnoses:   Anxiety   Depression, unspecified depression type   Irritability     This part of the medical record was transcribed by Jackelyn Hu, Medical Scribe, from a dictation done by Cristal Perez MD.     Cristal Perez md  [facility] EMERGENCY DEPARTMENT  11/21/2024     Cristal Perez MD  11/22/24 0245

## 2024-11-22 NOTE — PROGRESS NOTES
Patient slept few hours after coming from ED. Safety check completed every 15 minutes. No respiratory distress noted or observed. Patient is still in bed sleeping. Will continue to monitor pt.
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
## 2024-11-22 NOTE — ED NOTES
IP MH Referral Acuity Rating Score (RARS)    LMHP complete at referral to IP MH, with DEC; and, daily while awaiting IP MH placement. Call score to PPS.  CRITERIA SCORING   New 73 HH and Involuntary for IP MH (not adolescent) 0/1   Boarding over 24 hours 1/1   Vulnerable adult at least 55+ with multiple co morbidities; or, Patient age 11 or under 0/1   Suicide ideation without relief of precipitating factors 1/1   Current plan for suicide 0/1   Current plan for homicide 0/1   Imminent risk or actual attempt to seriously harm another without relief of factors precipitating the attempt 0/1   Severe dysfunction in daily living (ex: complete neglect for self care, extreme disruption in vegetative function, extreme deterioration in social interactions) 1/1   Recent (last 2 weeks) or current physical aggression in the ED 0/1   Restraints or seclusion episode in ED 0/1   Verbal aggression, agitation, yelling, etc., while in the ED 0/1   Active psychosis with psychomotor agitation or catatonia 0/1   Need for constant or near constant redirection (from leaving, from others, etc).  0/1   Intrusive or disruptive behaviors 0/1   TOTAL Acuity Total Score: 3
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
## 2024-11-22 NOTE — PHARMACY-ADMISSION MEDICATION HISTORY
Pharmacist Admission Medication History    Admission medication history is complete. The information provided in this note is only as accurate as the sources available at the time of the update.    Information Source(s): Patient and CareEverywhere/SureScripts via in-person    Pertinent Information:   Patient hasn't taken her scheduled medications for about 3 weeks. She hasn't taken citalopram in almost two months. She was recently prescribed fluoxetine 20 mg daily to replace citalopram, however hasn't picked this prescription up yet.     Changes made to PTA medication list:  Added: fluoxetine (not marked as taking as patient hasn't picked up yet), albuterol, Ritalin LA, ondansetron  Deleted: ibuprofen, Zepbound 2.5 and 5 mg doses   Changed: lorazepam (was 1 tablet TID prn, now daily prn per fill history)    Allergies reviewed with patient and updates made in EHR: yes    Medication History Completed By: Rebeka Santana formerly [facility] 11/22/2024 11:28 AM    PTA Med List   Medication Sig Last Dose/Taking    albuterol (PROAIR HFA/PROVENTIL HFA/VENTOLIN HFA) 108 (90 Base) MCG/ACT inhaler Inhale 2 puffs into the lungs every 6 hours as needed for shortness of breath, wheezing or cough. More than a month    buPROPion (WELLBUTRIN XL) 300 MG 24 hr tablet Take 450 mg by mouth every morning. Take 300 mg tablet along with 150 mg tablet for total daily dose of 450 mg. Past Month    citalopram (CELEXA) 40 MG tablet Take 40 mg by mouth daily More than a month    LORazepam (ATIVAN) 0.5 MG tablet Take 0.5 mg by mouth daily as needed for anxiety or sleep. More than a month    methylphenidate (RITALIN LA) 20 MG 24 hr capsule Take 20 mg by mouth daily. Past Month    ondansetron (ZOFRAN) 4 MG tablet Take 4 mg by mouth every 8 hours as needed for nausea. More than a month    tirzepatide-Weight Management (ZEPBOUND) 7.5 MG/0.5ML prefilled pen Inject 0.5 mLs (7.5 mg) subcutaneously every 7 days. (Patient taking differently: Inject 7.5 mg  subcutaneously every 7 days. On Mondays) 11/18/2024    XOPENEX HFA 45 MCG/ACT inhaler Inhale 1-2 puffs into the lungs every 4 hours as needed for wheezing More than a month

## 2024-11-22 NOTE — PROGRESS NOTES
Pt came from ED to [facility]. Pleasant and cooperative. Denied pain and all psych symptoms.Will continue to monitor pt.

## 2024-11-23 PROBLEM — F33.2 MDD (MAJOR DEPRESSIVE DISORDER), RECURRENT SEVERE, WITHOUT PSYCHOSIS (H): Status: ACTIVE | Noted: 2024-11-23

## 2024-11-23 PROCEDURE — 97150 GROUP THERAPEUTIC PROCEDURES: CPT | Mod: GO

## 2024-11-23 PROCEDURE — 250N000013 HC RX MED GY IP 250 OP 250 PS 637: Performed by: PSYCHIATRY & NEUROLOGY

## 2024-11-23 PROCEDURE — 124N000002 HC R&B MH UMMC

## 2024-11-23 PROCEDURE — 99222 1ST HOSP IP/OBS MODERATE 55: CPT | Performed by: PSYCHIATRY & NEUROLOGY

## 2024-11-23 RX ORDER — BUPROPION HYDROCHLORIDE 300 MG/1
300 TABLET ORAL EVERY MORNING
Status: DISCONTINUED | OUTPATIENT
Start: 2024-11-24 | End: 2024-11-23

## 2024-11-23 RX ORDER — BUPROPION HYDROCHLORIDE 300 MG/1
300 TABLET ORAL EVERY MORNING
Status: DISCONTINUED | OUTPATIENT
Start: 2024-11-23 | End: 2024-11-24 | Stop reason: HOSPADM

## 2024-11-23 RX ADMIN — BUPROPION HYDROCHLORIDE 300 MG: 300 TABLET, EXTENDED RELEASE ORAL at 09:56

## 2024-11-23 RX ADMIN — FLUOXETINE HYDROCHLORIDE 20 MG: 20 CAPSULE ORAL at 09:56

## 2024-11-23 ASSESSMENT — ACTIVITIES OF DAILY LIVING (ADL)
ADLS_ACUITY_SCORE: 0
HYGIENE/GROOMING: INDEPENDENT
ADLS_ACUITY_SCORE: 0
LAUNDRY: UNABLE TO COMPLETE
HYGIENE/GROOMING: INDEPENDENT
DRESS: SCRUBS (BEHAVIORAL HEALTH)
ORAL_HYGIENE: INDEPENDENT
ADLS_ACUITY_SCORE: 0
DRESS: SCRUBS (BEHAVIORAL HEALTH);INDEPENDENT
ADLS_ACUITY_SCORE: 0
ORAL_HYGIENE: INDEPENDENT
ADLS_ACUITY_SCORE: 0
ADLS_ACUITY_SCORE: 0

## 2024-11-23 NOTE — PLAN OF CARE
Goal Outcome Evaluation:       Pt was in bed at the beginning of the shift.Pt slept for 7 hours.No behavior or concerns noted during this shift.Pt remains on 15 minutes safety checks.Will continue to monitor.
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
## 2024-11-23 NOTE — PLAN OF CARE
11/22/24 1950   Patient Belongings   Patient Belongings locker   Patient Belongings Put in Hospital Secure Location (Security or Locker, etc.) cell phone/electronics;clothing;shoes   Belongings Search Yes   Clothing Search Yes   Second Staff kristine damon     Locker: grey shirt, black leggings, tennis shoes, maroon sweater, [place] ID, 1 white sock, 1 black sock, underwear, sports bra    No wallet or keys brought in  Nothing sent to security    A               Admission:  I am responsible for any personal items that are not sent to the safe or pharmacy.  [facility] is not responsible for loss, theft or damage of any property in my possession.    Signature:  _________________________________ Date: _______  Time: _____                                              Staff Signature:  ____________________________ Date: ________  Time: _____      2nd Staff person, if patient is unable/unwilling to sign:    Signature: ________________________________ Date: ________  Time: _____     Discharge:  [facility] has returned all of my personal belongings:    Signature: _________________________________ Date: ________  Time: _____                                          Staff Signature:  ____________________________ Date: ________  Time: _____

## 2024-11-23 NOTE — PLAN OF CARE
Pt was in milieu at start of shift. During check in pt reported that she was unsure what more could be done for her here as the reason she admitted herself was more for concerns regarding medication adherence rather then mental symptoms. Pt denied AVH, SI and HI. Pt reported having anxiety and depression because she was told she had to remain here until Monday and she wanted to go home to be with her kids. Nursing staff explained the option of 12 hour intent, ensuring patient was aware that if provider assessed her and deemed her unsafe to leave she could be placed on 72 hour hold, therefore it may be more beneficial to wait until Monday. Patient appeared to understand. Pt denied having any pain, nausea, or GI symptoms. Pt reported that she had difficulty adhering to her medication regimen which then causes a reemergence of mental health symptoms. She reported that the barriers present that keep her from adhering to her medications are forgetfulness, and inability to take medications later in the day as taking her medications too late make it difficult for her to sleep. She also reported that her obsessive compulsive disorder makes it so that she is unable to take her medications with anything other then bottled water and if she doesn't have this then she does not take her meds. Pt reported that she had consumed very few fluids since admission because she did not have access to bottled water therefore nursing staff provided her with a few bottles of water. Pt spent the rest of the shift napping in her room. Pt remained med compliant but refused her lab draws, stating she doesn't like needles.      Problem: Adult Behavioral Health Plan of Care  Goal: Optimized Coping Skills in Response to Life Stressors  Outcome: Progressing
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
## 2024-11-23 NOTE — ED NOTES
Pt has been cooperative throughout this writer's shift. Pt has colored throughout shift. Pt is able to make her needs known.

## 2024-11-23 NOTE — PROGRESS NOTES
"Patient is a 29 year old female who was admitted from [facility] unit to [facility] [address].According to [facility] nurse report, patient came to the hospital because her mental health is not okay. Patient was compliant with admission assessment. Patient likes [facility] unit better because \"they have security, and her room had a TV\".  Patient stated.Patient was educated about unit rules and shared TV in the lounge. Patient reports that the reason why she came to the hospital is because she stopped taking her medications and decompensated. Patient is willing to take her medications and discharge as soon as possible since \"I want to go back to my children). Patient denies pain. Patient denies anxiety, depression, suicidal, homicidal, auditory, and visual hallucinations. Contracts for safety. Patient requested Prn Trazodone twice for insomnia. Patient also requested prn Hydroxyzine because of another peer who was hyper verbal. Patient's vitals are within normal limit.  "

## 2024-11-24 VITALS
HEART RATE: 98 BPM | RESPIRATION RATE: 16 BRPM | TEMPERATURE: 98.8 F | OXYGEN SATURATION: 100 % | DIASTOLIC BLOOD PRESSURE: 83 MMHG | SYSTOLIC BLOOD PRESSURE: 115 MMHG

## 2024-11-24 PROCEDURE — 250N000013 HC RX MED GY IP 250 OP 250 PS 637: Performed by: PSYCHIATRY & NEUROLOGY

## 2024-11-24 PROCEDURE — 99239 HOSP IP/OBS DSCHRG MGMT >30: CPT | Performed by: PSYCHIATRY & NEUROLOGY

## 2024-11-24 RX ADMIN — FLUOXETINE HYDROCHLORIDE 20 MG: 20 CAPSULE ORAL at 09:00

## 2024-11-24 RX ADMIN — BUPROPION HYDROCHLORIDE 300 MG: 300 TABLET, EXTENDED RELEASE ORAL at 09:00

## 2024-11-24 ASSESSMENT — ACTIVITIES OF DAILY LIVING (ADL)
ADLS_ACUITY_SCORE: 0
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
## 2024-11-24 NOTE — PLAN OF CARE
Problem: Adult Behavioral Health Plan of Care  Goal: Adheres to Safety Considerations for Self and Others  Outcome: Progressing   Goal Outcome Evaluation:    Plan of Care Reviewed With: patient      Pt was calm during this shift. She slept for the most part of this shift. She was mostly isolative. Food, and fluid intake was good. Pt denied all psych symptoms. Staff will continue to follow plan of care.

## 2024-11-24 NOTE — DISCHARGE INSTRUCTIONS
Behavioral Discharge Planning and Instructions    Summary: You were admitted on 11/22/2024  due to agitation, worsening mood and anxiety.  You were treated by Morales Angulo MD and discharged on 11/24/24 from [facility] to Home.    Main Diagnosis: Borderline PD, PTSD, ADHD, Major Depression and OCD.     Health Care Follow-up:     Follow up with your established DBT and individual therapy providers.   Outpatient Psychiatrist: Eloina Moore CNP at [facility].    DBT group at [facility] is scheduled for 12/5/24 from 9am to 11am  Individual therapy at [facility] with MARIAH Francis scheduled on 12/4/24 @ 11am.      Information will be faxed to your outpatient providers to ensure a healthy continuity of care for you.     Attend all scheduled appointments with your outpatient providers. Call at least 24 hours in advance if you need to reschedule an appointment to ensure continued access to your outpatient providers.     Major Treatments, Procedures and Findings:  You were provided with: a psychiatric assessment, assessed for medical stability, medication evaluation and/or management, and milieu management    Symptoms to Report: feeling more aggressive, mood getting worse, or thoughts of suicide    Early warning signs can include: increased depression or anxiety sleep disturbances increased thoughts or behaviors of suicide or self-harm     Safety and Wellness:  Take all medicines as directed.  Make no changes unless your doctor suggests them.      Follow treatment recommendations.  Refrain from alcohol and non-prescribed drugs.  If there is a concern for safety, call 911.    Resources:   Mental Health Crisis Resources  Throughout [place]: call **CRISIS (**895951)  Crisis Text Line: is available for free, 24/7 by texting [place] to 324029  Suicide Awareness Voices of Education (SAVE) (www.save.org): 445-162-LOOM (1495)  The National Suicide Prevention Lifeline is now: 988 Suicide and Crisis Lifeline.  Call 988 anytime.  National [place] on Mental Illness (www.[place].gunjan.org): 300.617.1914 or 400-985-3919.  Jeqf2uxqc: text the word LIFE to 34773 for immediate support and crisis intervention  Mental Health Consumer/Survivor Network of [place] (www.mhcsn.net): 174.867.4662 or 284-019-0029  Mental Health Association of [place] (www.mentalhealth.org): 378.815.2439 or 007-447-5430  Peer Support Connection [place] Warmline (PSC) 1-584.404.5902 Available from 5pm - 9am (7 days a week/365 days a year)  [facility] 1-621.260.7856 [facility] Mental Crisis Program      General Medication Instructions:   See your medication sheet(s) for instructions.   Take all medicines as directed.  Make no changes unless your doctor suggests them.   Go to all your doctor visits.  Be sure to have all your required lab tests. This way, your medicines can be refilled on time.  Do not use any drugs not prescribed by your doctor.  Avoid alcohol.    Advance Directives:   Scanned document on file with [facility]? No scanned doc  Is document scanned? No. Copy Requested.  Honoring Choices Your Rights Handout: Informed and given  Was more information offered? Pt declined    The Treatment team has appreciated the opportunity to work with you. If you have any questions or concerns about your recent admission, you can contact the unit which can receive your call 24 hours a day, 7 days a week. They will be able to get in touch with a Provider if needed. The unit number is [phone] .

## 2024-11-24 NOTE — PLAN OF CARE
Goal Outcome Evaluation:       Pt had a quiet night.Pt slept for 7 hours.No behavior or concerns noted during this shift.Pt remains on 15 minutes safety checks.Will continue to monitor.

## 2024-11-24 NOTE — PLAN OF CARE
"  Rehab Group    Start time: 1600  End time: 1700  Patient time total: 60 minutes    attended full group    #7 attended   Group Type: occupational therapy   Group Topic Covered: balanced lifestyle, coping skills, healthy leisure time, and relaxation    Group Session Detail: OT: Education on healthy relaxation and creative hands-on endeavor (bookmarks) to increase concentration, focus, attention to task/detail, decision making, problem solving, frustration tolerance, task follow through, coping with stress, relaxation healthy leisure engagement, creative expression, and social engagement    Patient Response/Contribution:  cooperative with task, socially appropriate, and actively engaged   Patient Detail: Pt reported during check-in they enjoy \"using my mindfulness skills\" as a healthy form of relaxation. Pt independently selected project and gathered needed supplies. Pt sat among peers to complete selected project and engaged in brief social interactions with peers and therapist; lack of socialization may have been due to pt and peer's focus on task. Pt worked in a neat and tidy manner to complete project. Pt verbalized understanding of importance of healthy relaxation in a healthy lifestyle.       [id number] OT Group (2 or more in attendance)    Patient Active Problem List   Diagnosis     delivery delivered    Encounter for triage in pregnant patient    Class 2 severe obesity with serious comorbidity and body mass index (BMI) of 38.0 to 38.9 in adult, unspecified obesity type (H)    Depression with anxiety    Hidradenitis suppurativa    Borderline personality disorder (H)    ADHD (attention deficit hyperactivity disorder)    History of  delivery, antepartum    Pain    Nausea    Right upper quadrant pain    Upper back pain on right side    S/P  section    Anxiety    Irritability    Depression, unspecified depression type    Suicidal ideation    MDD (major depressive disorder), recurrent severe, " without psychosis (H)

## 2024-11-24 NOTE — PLAN OF CARE
"RN Discharge Summary     Patient presented with a calm affect. She reported feeling \"good\" and asked about the possibility of discharge. She said that her main priority is being home by Tuesday so she can attend her child's psych testing appointment, but she feels well enough to be home today with her kids, if possible. She said she feels much better after being back on her medications since Thursday. She said she came to the hospital voluntarily due feeling out of control of her anger towards her  and kids, and she needed some help with accountability to get back on her medications. She discussed some methods to ensure she continues with her medications when she returns home, stating that she gets busy taking care of her kids in the morning and easily forgets to take her medications. Some solutions discussed included moving her medications upstairs and keeping them in the bathroom or at her bedside and taking them before beginning her morning routine. She also wants to take her fluoxetine at bedtime only, as it makes her drowsy. She feels confident that she can continue her medication routine because she wants to be well for herself and her children, and she wants to stay out of the hospital. Patient denies SI/SIB/HI.    Patient requested information about a 12-hour intent to leave. Writer provided education about the form and implications of signing it. Writer explained that the attending provider would be on the unit tomorrow, along with social workers that could assist with setting up appointments if this is something that patient is interested in. Patient said that she has a therapist and psychiatrist in the community and that she is actively involved in DBT already, so she feels well-supported outside of the hospital. Patient decided to sign a 12-hour intent to leave form. Provider notified.    Provider met with patient and agreed with discharge plan. Patient's belongings were reviewed and returned to " her. Writer reviewed medications and AVS. Patient is understanding of her prescribed medications and has a medication plan in place. Patient's  picked up patient from the hospital at about 1030 to return home.

## 2024-12-08 ENCOUNTER — HEALTH MAINTENANCE LETTER (OUTPATIENT)
Age: 29
End: 2024-12-08

## 2025-01-15 ENCOUNTER — VIRTUAL VISIT (OUTPATIENT)
Dept: ENDOCRINOLOGY | Facility: CLINIC | Age: 30
End: 2025-01-15
Payer: COMMERCIAL

## 2025-01-15 VITALS — WEIGHT: 135 LBS | BODY MASS INDEX: 27.27 KG/M2

## 2025-01-15 DIAGNOSIS — E66.01 CLASS 3 SEVERE OBESITY WITH SERIOUS COMORBIDITY AND BODY MASS INDEX (BMI) OF 40.0 TO 44.9 IN ADULT, UNSPECIFIED OBESITY TYPE (H): ICD-10-CM

## 2025-01-15 DIAGNOSIS — E66.813 CLASS 3 SEVERE OBESITY WITH SERIOUS COMORBIDITY AND BODY MASS INDEX (BMI) OF 40.0 TO 44.9 IN ADULT, UNSPECIFIED OBESITY TYPE (H): ICD-10-CM

## 2025-01-15 NOTE — Clinical Note
1/15/2025       RE: Fatmata Catherine  [address] [place] [address]     Dear Colleague,    Thank you for referring your patient, Fatmata Catherine, to the [facility]. Please see a copy of my visit note below.      Return Medical Weight Management Note     Fatmata Catherine  MRN:  [id number]  :  1995  VAISHALI:  1/15/2025    Dear Physician No Ref-Primary,    I had the pleasure of seeing your patient Fatmata Catherine. She is a 29 year old female who I am continuing to see for treatment of obesity related to:        10/5/2022     9:59 AM   --   I have the following health issues associated with obesity High Blood Pressure    Stress Incontinence   I have the following symptoms associated with obesity Depression    Back Pain    Fatigue    Irregular Menstral Cycle       Assessment & Plan  Problem List Items Addressed This Visit    None  Visit Diagnoses       Class 3 severe obesity with serious comorbidity and body mass index (BMI) of 40.0 to 44.9 in adult, unspecified obesity type (H)        Relevant Medications    tirzepatide-Weight Management (ZEPBOUND) 7.5 MG/0.5ML prefilled pen    Other Relevant Orders    Comprehensive metabolic panel    Vitamin D Deficiency    Hemoglobin A1c    Lipid panel reflex to direct LDL Fasting    Parathyroid Hormone Intact           Plan  Continue zepbound 7.5mg, refills sent   Goals we discussed today:   Tracking protein, ensuring 90g daily   Getting back into exercising regularly   Labs ordered today: new mw labs, were not completed at initial visit   Follow up with [facility] in 3 months   Dietician appointment as needed   Keep up the excellent work!       INTERVAL HISTORY:  First seen for New MWM - 10/5/2022 by Cristal Timmons    Started Saxenda, lost 15lbs, last seen 3/1/2023. Saw good weight loss with wegovy.   Became pregnant shortly after, stopped wegovy.      New to me  24  Had baby 2 weeks ago via , healthy baby boy, both mom and baby are doing well. Is interested in restarting weight loss medications, is really hoping to restart wegovy as she saw the most success from this medication and felt that while taking it she was able to sustainably lose weight for the first time in her life.      Not breast feeding at all, expressed understanding that these medications are not safe to be on while breastfeeding.       Seen by me 24  -started zepbound, stopped due to supply issues  -switched to wegovy, insurance denied, requiring 2 visits with weight management over the last 6 months  -starting appeal for wegovy today (24)    ED 24- dysuria, started keflex 500mg Bid x 7 days      Saw 15lbs weight loss since last visit, though hasn't weighed herself since stopping zepbound.      Just picked up zepbound 5mg, hasn't started it yet. Discussed the importance of restarting zepbound 2.5mg for 4 weeks first to reduce risk for side effects.      -has since increased to zepbound 5mg      Last seen by me 24  20lbs weight loss since last visit      Improved energy with weight loss, feeling good with this weight loss.      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 This has been a stressful change but also helpful for her as she is now a lot more active in taking him to walls every day.       A couple of ED visits over the last month due to UTI concerns and some headache concerns. Doing her best to stay up with hydration, wonders if the UTI issues are related to a recent change in body.   -continued zepbound     24- ED visit for worsened agitation, emotional outbursts, had not been taking her psych meds for the previous 3 weeks. Admitted to psych hospital   Excerpt from psych admission:   However, she stopped taking her medications 2 months ago, and states that she  can not explain why she misses them but states that it is mostly due to forgetting them.     Patient states that has had more stress in her household with increase conflicts with her , which she describes as her biggest stressor. She feels that has lead to a worsening in her mood and increase in her irritability. She is also overwhelmed with parenting. She is on a leave of absence from her work.     Patient presented to ER complaining that she felt that she could no longer control her anger and was feeling unsafe believing that she was having a bad impact on her children due to her volatility.    Today in visit 1/15/25   30lbs weight loss over the last 4 months- down 65lbs over the last year.   Hasn't been taking zepbound for the last 2 weeks as she's been down with a cough.   Reports from a mental health perspective she's feeling stable, has been taking her anti-depressants regularly since this admission. Describes it was hard for her to remember to take all of these daily psych meds.     Feeling really good about her weight loss, seeing much more energy, mobility.     Wt Readings from Last 5 Encounters:   01/15/25 61.2 kg (135 lb)   11/21/24 68 kg (150 lb)   09/12/24 74.8 kg (165 lb)   05/21/24 83.9 kg (185 lb)   05/02/24 83.9 kg (185 lb)       Anti-obesity medication history    Current:   Zepbound 7.5mg- continuing to tolerate. Denies nausea, vomiting.     GERD symptoms: denies     Constipation/Diarrhea: denies     Recent diet changes: Still having to remind herself to eat. Working on drinking lots of water.   B: protein bar and bottle of water   L: cheese stick and meat (chicken breast, pepperoni, salami) OR sandwich on wheat bread   D: chicken with mashed potatoes and mixed vegetables, sometimes rice     Recent exercise/activity changes: describes exercise is not the worst but not the best- going to indoor water walls a lot with her son. Planning to start gym membership with her fiance in a few weeks.      Vitamins/Labs: still needs new [facility] labs     Pregnancy: s/p tubal ligation     CURRENT WEIGHT:   135 lbs 0 oz                      1/15/2025    10:23 AM   Changes and Difficulties   I have made the following changes to my diet since my last visit: n/a   With regards to my diet, I am still struggling with: n/a   I have made the following changes to my activity/exercise since my last visit: n/a   With regards to my activity/exercise, I am still struggling with: n/a             MEDICATIONS:   Current Outpatient Medications   Medication Sig Dispense Refill    albuterol (PROAIR HFA/PROVENTIL HFA/VENTOLIN HFA) 108 (90 Base) MCG/ACT inhaler Inhale 2 puffs into the lungs every 6 hours as needed for shortness of breath, wheezing or cough.      buPROPion (WELLBUTRIN XL) 300 MG 24 hr tablet Take 450 mg by mouth every morning. Take 300 mg tablet along with 150 mg tablet for total daily dose of 450 mg.      FLUoxetine (PROZAC) 20 MG capsule Take 1 capsule (20 mg) by mouth daily. 30 capsule 1    LORazepam (ATIVAN) 0.5 MG tablet Take 0.5 mg by mouth daily as needed for anxiety or sleep.      methylphenidate (RITALIN LA) 20 MG 24 hr capsule Take 20 mg by mouth daily.      ondansetron (ZOFRAN) 4 MG tablet Take 4 mg by mouth every 8 hours as needed for nausea.      tirzepatide-Weight Management (ZEPBOUND) 7.5 MG/0.5ML prefilled pen Inject 0.5 mLs (7.5 mg) subcutaneously every 7 days. 2 mL 3    XOPENEX HFA 45 MCG/ACT inhaler Inhale 1-2 puffs into the lungs every 4 hours as needed for wheezing             1/15/2025    10:23 AM   Weight Loss Medication History Reviewed With Patient   Which weight loss medications are you currently taking on a regular basis? Wegovy   Are you having any side effects from the weight loss medication that we have prescribed you? No                No data to display                  PHYSICAL EXAM:  Objective   Wt 61.2 kg (135 lb)   BMI 27.27 kg/m      Vitals - Patient Reported  Weight (Patient Reported):  61.2 kg (135 lb)  Pain Score: No Pain (0)      Vitals:  No vitals were obtained today due to virtual visit.    GENERAL: alert and no distress  EYES: Eyes grossly normal to inspection.  No discharge or erythema, or obvious scleral/conjunctival abnormalities.  RESP: No audible wheeze, cough, or visible cyanosis.    SKIN: Visible skin clear. No significant rash, abnormal pigmentation or lesions.  NEURO: Cranial nerves grossly intact.  Mentation and speech appropriate for age.  PSYCH: Appropriate affect, tone, and pace of words        Sincerely,    Anya Good PA-C      14 minutes spent by me on the date of the encounter doing chart review, history and exam, documentation and further activities per the note    The longitudinal plan of care for the diagnosis(es)/condition(s) as documented were addressed during this visit. Due to the added complexity in care, I will continue to support Fatmata in the subsequent management and with ongoing continuity of care.     Virtual Visit Details    Type of service:  Video Visit   Video Start Time:  10:32am  Video End Time: 10:46am    Originating Location (pt. Location): Home    Distant Location (provider location):  Off-site  Platform used for Video Visit: AmWell        Again, thank you for allowing me to participate in the care of your patient.      Sincerely,    Anya Good PA-C

## 2025-01-15 NOTE — NURSING NOTE
Current patient location: [address] [place] [address]    Is the patient currently in the state of [place]? YES    Visit mode: VIDEO    If the visit is dropped, the patient can be reconnected by:TELEPHONE VISIT: Phone number:   Telephone Information:   Mobile [phone]       Will anyone else be joining the visit? NO  (If patient encounters technical issues they should call [phone])    Are changes needed to the allergy or medication list? Pt stated no med changes    Are refills needed on medications prescribed by this physician? NO    Rooming Documentation:  Questionnaire(s) not done per department protocol    Reason for visit: NALINI LEWIS

## 2025-01-15 NOTE — PROGRESS NOTES
Return Medical Weight Management Note     Fatmata Catherine  MRN:  [id number]  :  1995  VAISHALI:  1/15/2025    Dear Physician No Ref-Primary,    I had the pleasure of seeing your patient Fatmata Catherine. She is a 29 year old female who I am continuing to see for treatment of obesity related to:        10/5/2022     9:59 AM   --   I have the following health issues associated with obesity High Blood Pressure    Stress Incontinence   I have the following symptoms associated with obesity Depression    Back Pain    Fatigue    Irregular Menstral Cycle       Assessment & Plan   Problem List Items Addressed This Visit    None  Visit Diagnoses       Class 3 severe obesity with serious comorbidity and body mass index (BMI) of 40.0 to 44.9 in adult, unspecified obesity type (H)        Relevant Medications    tirzepatide-Weight Management (ZEPBOUND) 7.5 MG/0.5ML prefilled pen    Other Relevant Orders    Comprehensive metabolic panel    Vitamin D Deficiency    Hemoglobin A1c    Lipid panel reflex to direct LDL Fasting    Parathyroid Hormone Intact           Plan  Continue zepbound 7.5mg, refills sent   Goals we discussed today:   Tracking protein, ensuring 90g daily   Getting back into exercising regularly   Labs ordered today: new mwm labs, were not completed at initial visit   Follow up with [facility] in 3 months   Dietician appointment as needed   Keep up the excellent work!       INTERVAL HISTORY:  First seen for New MWM - 10/5/2022 by Cristal Johnson, lost 15lbs, last seen 3/1/2023. Saw good weight loss with wegovy.   Became pregnant shortly after, stopped wegovy.      New to me 24  Had baby 2 weeks ago via , healthy baby boy, both mom and baby are doing well. Is interested in restarting weight loss medications, is really hoping to restart wegovy as she saw the most success from this medication and felt that while taking it she was able to sustainably lose weight for the first time in her  life.      Not breast feeding at all, expressed understanding that these medications are not safe to be on while breastfeeding.       Seen by me 5/21/24  -started zepbound, stopped due to supply issues  -switched to wegovy, insurance denied, requiring 2 visits with weight management over the last 6 months  -starting appeal for wegovy today (5/21/24)    ED 5/8/24- dysuria, started keflex 500mg Bid x 7 days      Saw 15lbs weight loss since last visit, though hasn't weighed herself since stopping zepbound.      Just picked up zepbound 5mg, hasn't started it yet. Discussed the importance of restarting zepbound 2.5mg for 4 weeks first to reduce risk for side effects.      -has since increased to zepbound 5mg      Last seen by me 9/12/24  20lbs weight loss since last visit      Improved energy with weight loss, feeling good with this weight loss.      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 This has been a stressful change but also helpful for her as she is now a lot more active in taking him to walls every day.       A couple of ED visits over the last month due to UTI concerns and some headache concerns. Doing her best to stay up with hydration, wonders if the UTI issues are related to a recent change in body.   -continued zepbound     11/21/24- ED visit for worsened agitation, emotional outbursts, had not been taking her psych meds for the previous 3 weeks. Admitted to psych hospital   Excerpt from psych admission:   However, she stopped taking her medications 2 months ago, and states that she can not explain why she misses them but states that it is mostly due to forgetting them.     Patient states that has had more stress in her household with increase conflicts with her , which she describes as her biggest stressor. She feels that has lead to a worsening in her mood and increase in her irritability. She is  also overwhelmed with parenting. She is on a leave of absence from her work.     Patient presented to ER complaining that she felt that she could no longer control her anger and was feeling unsafe believing that she was having a bad impact on her children due to her volatility.    Today in visit 1/15/25   30lbs weight loss over the last 4 months- down 65lbs over the last year.   Hasn't been taking zepbound for the last 2 weeks as she's been down with a cough.   Reports from a mental health perspective she's feeling stable, has been taking her anti-depressants regularly since this admission. Describes it was hard for her to remember to take all of these daily psych meds.     Feeling really good about her weight loss, seeing much more energy, mobility.     Wt Readings from Last 5 Encounters:   01/15/25 61.2 kg (135 lb)   11/21/24 68 kg (150 lb)   09/12/24 74.8 kg (165 lb)   05/21/24 83.9 kg (185 lb)   05/02/24 83.9 kg (185 lb)       Anti-obesity medication history    Current:   Zepbound 7.5mg- continuing to tolerate. Denies nausea, vomiting.     GERD symptoms: denies     Constipation/Diarrhea: denies     Recent diet changes: Still having to remind herself to eat. Working on drinking lots of water.   B: protein bar and bottle of water   L: cheese stick and meat (chicken breast, pepperoni, salami) OR sandwich on wheat bread   D: chicken with mashed potatoes and mixed vegetables, sometimes rice     Recent exercise/activity changes: describes exercise is not the worst but not the best- going to indoor water walls a lot with her son. Planning to start gym membership with her fiance in a few weeks.     Vitamins/Labs: still needs new mwm labs     Pregnancy: s/p tubal ligation     CURRENT WEIGHT:   135 lbs 0 oz                      1/15/2025    10:23 AM   Changes and Difficulties   I have made the following changes to my diet since my last visit: n/a   With regards to my diet, I am still struggling with: n/a   I have made the  following changes to my activity/exercise since my last visit: n/a   With regards to my activity/exercise, I am still struggling with: n/a             MEDICATIONS:   Current Outpatient Medications   Medication Sig Dispense Refill    albuterol (PROAIR HFA/PROVENTIL HFA/VENTOLIN HFA) 108 (90 Base) MCG/ACT inhaler Inhale 2 puffs into the lungs every 6 hours as needed for shortness of breath, wheezing or cough.      buPROPion (WELLBUTRIN XL) 300 MG 24 hr tablet Take 450 mg by mouth every morning. Take 300 mg tablet along with 150 mg tablet for total daily dose of 450 mg.      FLUoxetine (PROZAC) 20 MG capsule Take 1 capsule (20 mg) by mouth daily. 30 capsule 1    LORazepam (ATIVAN) 0.5 MG tablet Take 0.5 mg by mouth daily as needed for anxiety or sleep.      methylphenidate (RITALIN LA) 20 MG 24 hr capsule Take 20 mg by mouth daily.      ondansetron (ZOFRAN) 4 MG tablet Take 4 mg by mouth every 8 hours as needed for nausea.      tirzepatide-Weight Management (ZEPBOUND) 7.5 MG/0.5ML prefilled pen Inject 0.5 mLs (7.5 mg) subcutaneously every 7 days. 2 mL 3    XOPENEX HFA 45 MCG/ACT inhaler Inhale 1-2 puffs into the lungs every 4 hours as needed for wheezing             1/15/2025    10:23 AM   Weight Loss Medication History Reviewed With Patient   Which weight loss medications are you currently taking on a regular basis? Wegovy   Are you having any side effects from the weight loss medication that we have prescribed you? No                No data to display                  PHYSICAL EXAM:  Objective    Wt 61.2 kg (135 lb)   BMI 27.27 kg/m      Vitals - Patient Reported  Weight (Patient Reported): 61.2 kg (135 lb)  Pain Score: No Pain (0)      Vitals:  No vitals were obtained today due to virtual visit.    GENERAL: alert and no distress  EYES: Eyes grossly normal to inspection.  No discharge or erythema, or obvious scleral/conjunctival abnormalities.  RESP: No audible wheeze, cough, or visible cyanosis.    SKIN: Visible  skin clear. No significant rash, abnormal pigmentation or lesions.  NEURO: Cranial nerves grossly intact.  Mentation and speech appropriate for age.  PSYCH: Appropriate affect, tone, and pace of words        Sincerely,    Anya Good PA-C      14 minutes spent by me on the date of the encounter doing chart review, history and exam, documentation and further activities per the note    The longitudinal plan of care for the diagnosis(es)/condition(s) as documented were addressed during this visit. Due to the added complexity in care, I will continue to support Fatmata in the subsequent management and with ongoing continuity of care.

## 2025-01-15 NOTE — PATIENT INSTRUCTIONS
"Thank you for allowing us the privilege of caring for you. We hope we provided you with the excellent service you deserve.   Please let us know if there is anything else we can do for you so that we can be sure you are completely satisfied with your care experience.    To ensure the quality of our services you may be receiving a patient satisfaction survey from an independent patient satisfaction monitoring company.    The greatest compliment you can give is a \"Likely to Recommend\"    Your visit was with Anya Good PA-C today.    Instructions per today's visit:     Vishal Catherine, it was great to visit with you today.  Here is a review of our visit.  If our clinic scheduler is not able to reach you please call [phone] to schedule your next appointments.    Plan  Continue zepbound 7.5mg, refills sent   Goals we discussed today:   Tracking protein, ensuring 90g daily   Getting back into exercising regularly   Labs ordered today: new mwm labs, were not completed at initial visit   Follow up with IVETH in 3 months   Dietician appointment as needed   Keep up the excellent work!       Information about Video Visits with [facility]: video visit information  _________________________________________________________________________________________________________________________________________________________  If you are asked by your clinic team to have your blood pressure checked:  [facility] do offer several locations for blood pressure checks. Please follow the below link to schedule an appointment. Scheduling an appointment at the pharmacy for a blood pressure check is now preferred.    Appointment Plus ([email or web address])  _________________________________________________________________________________________________________________________________________________________  Important contact and scheduling information:  Please call our [facility] center at [phone] to schedule your " next appointments.  To find a lab location near you, please call [phone].  For any nursing questions or concerns call Cortney White LPN at [phone] or Savi Viera RN at [phone]  Please call during clinic hours Monday through Friday 8:00a - 4:00p if you have questions or you can contact us via [employer] at anytime and we will reply during clinic hours.    Lab results will be communicated through My Chart or letter (if My Chart not used). Please call the clinic if you have not received communication after 1 week or if you have any questions.?  Clinic Fax: [phone]    _Interested in working with a health ?  Health coaches work with you to improve your overall health and wellbeing.  They look at the whole person, and may involve discussion of different areas of life, including, but not limited to the four pillars of health (sleep, exercise, nutrition, and stress management). Discuss with your care team if you would like to start working a health .  Health Coaching-3 Pack: Schedule by calling [phone]    $99 for three health coaching visits    Visits may be done in person or via phone    Coaching is a partnership between the  and the client; Coaches do not prescribe or diagnose    Coaching helps inspire the client to reach his/her personal goals   _________________________________________________________________________________________________________________________________________________________  __________  [place] [facility] Get Moving Program  Our team of physical therapists is trained to help you understand and take control of your condition. They will perform a thorough evaluation to determine your ability for activity and develop a customized plan to fit your goals and physical ability.  Scheduling: Unsure if the Get Moving program is right for you? Discuss the program with your medical provider or diabetes educator. You can also call us at [phone] to ask  questions or schedule an appointment.   МАРИНА Get Moving Program  ____________________________________________________________________________________________________________________________________________________________________________        Thank tim,   [facility] Comprehensive Weight Management Team

## 2025-01-15 NOTE — PROGRESS NOTES
Virtual Visit Details    Type of service:  Video Visit   Video Start Time:  10:32am  Video End Time: 10:46am    Originating Location (pt. Location): Home    Distant Location (provider location):  Off-site  Platform used for Video Visit: Aissatou

## 2025-01-21 ENCOUNTER — TELEPHONE (OUTPATIENT)
Dept: ENDOCRINOLOGY | Facility: CLINIC | Age: 30
End: 2025-01-21
Payer: COMMERCIAL

## 2025-01-21 NOTE — TELEPHONE ENCOUNTER
PA RENEWAL Initiation    Medication: ZEPBOUND 7.5 MG/0.5ML SC SOAJ  Insurance Company: [employer] PMAP - Phone [phone] Fax [phone]  Pharmacy Filling the Rx:    Filling Pharmacy Phone:    Filling Pharmacy Fax:    Start Date: 1/21/2025    [id number]

## 2025-01-21 NOTE — TELEPHONE ENCOUNTER
Prior Authorization RENEWAL Approval    Medication: ZEPBOUND 7.5 MG/0.5ML SC SOAJ  Authorization Effective Date: 12/21/2024  Authorization Expiration Date: 1/21/2026  Approved Dose/Quantity: 2ml per 28 days  Reference #: [id number]   Insurance Company: [employer] - Phone [phone] Fax [phone]  Expected CoPay: $    CoPay Card Available:      Financial Assistance Needed:   Which Pharmacy is filling the prescription:    Pharmacy Notified:   Patient Notified:

## 2025-01-22 ENCOUNTER — TELEPHONE (OUTPATIENT)
Dept: ENDOCRINOLOGY | Facility: CLINIC | Age: 30
End: 2025-01-22
Payer: COMMERCIAL

## 2025-01-22 NOTE — TELEPHONE ENCOUNTER
Patient confirmed scheduled appointment:     Date: 7/3/25  Time: 2 PM  Visit type: Return Weight Management  Visit mode: Virtual Visit  Provider:  Anya Good PA-C  Location: [facility]    Additional Notes: Patient will schedule her own labs

## 2025-01-29 DIAGNOSIS — E66.01 CLASS 3 SEVERE OBESITY WITH SERIOUS COMORBIDITY AND BODY MASS INDEX (BMI) OF 40.0 TO 44.9 IN ADULT, UNSPECIFIED OBESITY TYPE (H): Primary | ICD-10-CM

## 2025-01-29 DIAGNOSIS — E66.813 CLASS 3 SEVERE OBESITY WITH SERIOUS COMORBIDITY AND BODY MASS INDEX (BMI) OF 40.0 TO 44.9 IN ADULT, UNSPECIFIED OBESITY TYPE (H): Primary | ICD-10-CM

## 2025-04-15 ENCOUNTER — TELEPHONE (OUTPATIENT)
Dept: ENDOCRINOLOGY | Facility: CLINIC | Age: 30
End: 2025-04-15
Payer: COMMERCIAL

## 2025-04-15 NOTE — TELEPHONE ENCOUNTER
Patient confirmed scheduled appointment:     Date: 7/8/25  Time: 4PM  Visit type: Return Weight Management  Visit mode: Virtual Visit  Provider:  Anya Good PA-C  Location: [facility]    Additional Notes:   Rescheduled from 7/3/25

## 2025-06-18 DIAGNOSIS — E66.813 CLASS 3 SEVERE OBESITY WITH SERIOUS COMORBIDITY AND BODY MASS INDEX (BMI) OF 40.0 TO 44.9 IN ADULT, UNSPECIFIED OBESITY TYPE (H): ICD-10-CM

## 2025-06-18 NOTE — TELEPHONE ENCOUNTER
Last Written Prescription:  tirzepatide-Weight Management (ZEPBOUND) 10 MG/0.5ML prefilled pen 2 mL 3 1/29/2025 -- No   Sig - Route: Inject 0.5 mLs (10 mg) subcutaneously every 7 days. - Subcutaneous     ----------------------  Last Visit Date: 1-15-25  Future Visit Date: 7-8-25  -    Refill decision: Medication unable to be refilled by RN due to: Other:  Not on wt mgmt protocol        Request from pharmacy:  Requested Prescriptions   Pending Prescriptions Disp Refills    tirzepatide-Weight Management (ZEPBOUND) 10 MG/0.5ML prefilled pen 2 mL 3     Sig: Inject 0.5 mLs (10 mg) subcutaneously every 7 days.       There is no refill protocol information for this order

## 2025-06-23 DIAGNOSIS — E66.813 CLASS 3 SEVERE OBESITY WITH SERIOUS COMORBIDITY AND BODY MASS INDEX (BMI) OF 40.0 TO 44.9 IN ADULT, UNSPECIFIED OBESITY TYPE (H): ICD-10-CM

## 2025-07-07 NOTE — PROGRESS NOTES
Virtual Visit Details    Type of service:  Video Visit   Video Start Time: 4:05 PM  Video End Time:4:20pm    Originating Location (pt. Location): Home    Distant Location (provider location):  Off-site  Platform used for Video Visit: [facility] Weight Management Note     Fatmata Catherine  MRN:  [id number]  :  1995  VAISHALI:  2025    Dear Physician No Ref-Primary,    I had the pleasure of seeing your patient Fatmata Catherine. She is a 30 year old female who I am continuing to see for treatment of obesity related to:        10/5/2022     9:59 AM   --   I have the following health issues associated with obesity High Blood Pressure    Stress Incontinence   I have the following symptoms associated with obesity Depression    Back Pain    Fatigue    Irregular Menstral Cycle       Assessment & Plan   Problem List Items Addressed This Visit    None  Visit Diagnoses         Class 3 severe obesity with serious comorbidity and body mass index (BMI) of 40.0 to 44.9 in adult, unspecified obesity type (H)    -  Primary    Relevant Orders    Adult Plastic Surgery  Referral           Plan  Continue zepbound 10mg, okay to trial reduction to 7.5mg if wanting to see if this is a good maintenance dose   Goals we discussed today:   Continue to prioritize protein at every meal to help with building muscle   Explore variety of weight training.   Labs ordered at previous visit- please get these done when you are able   Referral placed to plastic surgery given loose skin concerns, 30% total body weight loss since starting with our program  Follow up with Anya in 3 months   Dietician appointment as needed  Keep up the excellent work!         INTERVAL HISTORY:  First seen for New MWM - 10/5/2022 by Cristal Timmons    Started Saxenda, lost 15lbs, last seen 3/1/2023. Saw good weight loss with wegovy.   Became pregnant shortly after, stopped wegovy.      New to me 24  Had baby 2 weeks ago via ,  healthy baby boy, both mom and baby are doing well. Is interested in restarting weight loss medications, is really hoping to restart wegovy as she saw the most success from this medication and felt that while taking it she was able to sustainably lose weight for the first time in her life.      Not breast feeding at all, expressed understanding that these medications are not safe to be on while breastfeeding.       Seen by me 5/21/24  -started zepbound, stopped due to supply issues  -switched to wegovy, insurance denied, requiring 2 visits with weight management over the last 6 months  -starting appeal for wegovy today (5/21/24)    ED 5/8/24- dysuria, started keflex 500mg Bid x 7 days      Saw 15lbs weight loss since last visit, though hasn't weighed herself since stopping zepbound.      Just picked up zepbound 5mg, hasn't started it yet. Discussed the importance of restarting zepbound 2.5mg for 4 weeks first to reduce risk for side effects.      -has since increased to zepbound 5mg      Seen by me 9/12/24  20lbs weight loss since last visit      Improved energy with weight loss, feeling good with this weight loss.      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 This has been a stressful change but also helpful for her as she is now a lot more active in taking him to walls every day.       A couple of ED visits over the last month due to UTI concerns and some headache concerns. Doing her best to stay up with hydration, wonders if the UTI issues are related to a recent change in body.   -continued zepbound      11/21/24- ED visit for worsened agitation, emotional outbursts, had not been taking her psych meds for the previous 3 weeks. Admitted to psych hospital   Excerpt from psych admission:   However, she stopped taking her medications 2 months ago, and states that she can not explain why she misses them but states that  it is mostly due to forgetting them.     Patient states that has had more stress in her household with increase conflicts with her , which she describes as her biggest stressor. She feels that has lead to a worsening in her mood and increase in her irritability. She is also overwhelmed with parenting. She is on a leave of absence from her work.     Patient presented to ER complaining that she felt that she could no longer control her anger and was feeling unsafe believing that she was having a bad impact on her children due to her volatility.     Last seen by me 1/15/25   30lbs weight loss over the last 4 months- down 65lbs over the last year.   Hasn't been taking zepbound for the last 2 weeks as she's been down with a cough.   Reports from a mental health perspective she's feeling stable, has been taking her anti-depressants regularly since this admission. Describes it was hard for her to remember to take all of these daily psych meds.      Feeling really good about her weight loss, seeing much more energy, mobility.   -continued zepbound 7.5mg   -increased via mychart to 10mg      Today in visit 7/8/25  Weight maintenance since last visit, down 55lbs since starting with program.   -reports she stopped taking her zepbound and mental health meds for about 1 month back in April 2025 due to worsened mental health, life stressors. Saw increased binge eating with stopping zepbound, believes she gained some weight in that time frame.     Restarted zepbound 10mg 1.5 months ago, denies side effects with restarting.       Currently feels mental health is better- just graduated DBT program. Working regularly with therapist regularly.   -gout attack two weeks ago, started colchicine, indomethacin, prednisone. Symptoms have improved with treatment. Seems to be related by drinking.       Dealing with worsened rash in abdomin skin folds- has tried nystatin powder and creams, has not seen major benefits with these.      Some interest in tapering off zepbound or exploring lower doses to see if  helpful for maintenance, can consider this in the future. Discussed risk of weight regain with stopping or reducing dose.   Wt Readings from Last 5 Encounters:   07/08/25 60.8 kg (134 lb)   01/15/25 61.2 kg (135 lb)   11/21/24 68 kg (150 lb)   09/12/24 74.8 kg (165 lb)   05/21/24 83.9 kg (185 lb)       Anti-obesity medication history    Current:   Zepbound 10mg- continuing to tolerate, denies side effects. Helpful with reducing food noise.       Recent diet changes:   B: protein bar   L: whole wheat tortilla with breaded chicken breast and cheese   D: homemade tacos with rice and peas. OR chicken. Sometimes stuffed shell pasta.   Snack: protein bar if still hungry or going to the gym     Recent exercise/activity changes: stair master for 35 minutes, 20-30 minutes of elliptical, weight training after this. Getting to gym at least twice a week- goal of 4x weekly,    Vitamins/Labs: labs ordered at previous visit     Pregnancy: s/p tubal ligation     CURRENT WEIGHT:   134 lbs 0 oz    Initial Weight (lbs): 189 lbs  Last Visits Weight: 74.8 kg (165 lb)  Cumulative weight loss (lbs): 55  Weight Loss Percentage: 29.1%        7/8/2025     3:47 PM   Changes and Difficulties   I have made the following changes to my diet since my last visit: none   I have made the following changes to my activity/exercise since my last visit: none             MEDICATIONS:   Current Outpatient Medications   Medication Sig Dispense Refill    albuterol (PROAIR HFA/PROVENTIL HFA/VENTOLIN HFA) 108 (90 Base) MCG/ACT inhaler Inhale 2 puffs into the lungs every 6 hours as needed for shortness of breath, wheezing or cough.      buPROPion (WELLBUTRIN XL) 300 MG 24 hr tablet Take 450 mg by mouth every morning. Take 300 mg tablet along with 150 mg tablet for total daily dose of 450 mg.      FLUoxetine (PROZAC) 20 MG capsule Take 1 capsule (20 mg) by mouth daily. 30 capsule 1     "LORazepam (ATIVAN) 0.5 MG tablet Take 0.5 mg by mouth daily as needed for anxiety or sleep.      methylphenidate (RITALIN LA) 20 MG 24 hr capsule Take 20 mg by mouth daily.      ondansetron (ZOFRAN) 4 MG tablet Take 4 mg by mouth every 8 hours as needed for nausea.      tirzepatide-Weight Management (ZEPBOUND) 10 MG/0.5ML prefilled pen Inject 0.5 mLs (10 mg) subcutaneously every 7 days. 2 mL 3    tirzepatide-Weight Management (ZEPBOUND) 7.5 MG/0.5ML prefilled pen Inject 0.5 mLs (7.5 mg) subcutaneously every 7 days. 2 mL 3    XOPENEX HFA 45 MCG/ACT inhaler Inhale 1-2 puffs into the lungs every 4 hours as needed for wheezing (Patient not taking: Reported on 7/8/2025)             7/8/2025     3:47 PM   Weight Loss Medication History Reviewed With Patient   Are you having any side effects from the weight loss medication that we have prescribed you? No                No data to display                  PHYSICAL EXAM:  Objective    Ht 1.499 m (4' 11.02\")   Wt 60.8 kg (134 lb)   BMI 27.05 kg/m      Vitals - Patient Reported  Pain Score: No Pain (0)      Vitals:  No vitals were obtained today due to virtual visit.    GENERAL: alert and no distress  EYES: Eyes grossly normal to inspection.  No discharge or erythema, or obvious scleral/conjunctival abnormalities.  RESP: No audible wheeze, cough, or visible cyanosis.    SKIN: Visible skin clear. No significant rash, abnormal pigmentation or lesions.  NEURO: Cranial nerves grossly intact.  Mentation and speech appropriate for age.  PSYCH: Appropriate affect, tone, and pace of words        Sincerely,    Anya Good PA-C      25 minutes spent by me on the date of the encounter doing chart review, history and exam, documentation and further activities per the note    The longitudinal plan of care for the diagnosis(es)/condition(s) as documented were addressed during this visit. Due to the added complexity in care, I will continue to support Fatmata in the subsequent " management and with ongoing continuity of care.

## 2025-07-08 ENCOUNTER — VIRTUAL VISIT (OUTPATIENT)
Dept: ENDOCRINOLOGY | Facility: CLINIC | Age: 30
End: 2025-07-08
Payer: COMMERCIAL

## 2025-07-08 VITALS — HEIGHT: 59 IN | BODY MASS INDEX: 27.01 KG/M2 | WEIGHT: 134 LBS

## 2025-07-08 DIAGNOSIS — E66.813 CLASS 3 SEVERE OBESITY WITH SERIOUS COMORBIDITY AND BODY MASS INDEX (BMI) OF 40.0 TO 44.9 IN ADULT, UNSPECIFIED OBESITY TYPE (H): Primary | ICD-10-CM

## 2025-07-08 PROCEDURE — 98005 SYNCH AUDIO-VIDEO EST LOW 20: CPT

## 2025-07-08 PROCEDURE — G2211 COMPLEX E/M VISIT ADD ON: HCPCS | Mod: 95

## 2025-07-08 PROCEDURE — 1126F AMNT PAIN NOTED NONE PRSNT: CPT | Mod: 95

## 2025-07-08 ASSESSMENT — PAIN SCALES - GENERAL: PAINLEVEL_OUTOF10: NO PAIN (0)

## 2025-07-08 NOTE — PATIENT INSTRUCTIONS
"Thank you for allowing us the privilege of caring for you. We hope we provided you with the excellent service you deserve.   Please let us know if there is anything else we can do for you so that we can be sure you are completely satisfied with your care experience.    To ensure the quality of our services you may be receiving a patient satisfaction survey from an independent patient satisfaction monitoring company.    The greatest compliment you can give is a \"Likely to Recommend\"    Your visit was with Anya Good PA-C today.    Instructions per today's visit:     Vishal Catherine, it was great to visit with you today.  Here is a review of our visit.  If our clinic scheduler is not able to reach you please call [phone] to schedule your next appointments.    Plan  Continue zepbound 10mg, okay to trial reduction to 7.5mg if wanting to see if this is a good maintenance dose   Goals we discussed today:   Continue to prioritize protein at every meal to help with building muscle   Explore variety of weight training.   Labs ordered at previous visit- please get these done when you are able   Referral placed to plastic surgery given loose skin concerns, 30% total body weight loss since starting with our program  Follow up with Anya in 3 months   Dietician appointment as needed  Keep up the excellent work!       Information about Video Visits with [facility]: video visit information  _________________________________________________________________________________________________________________________________________________________  If you are asked by your clinic team to have your blood pressure checked:  [facility] do offer several locations for blood pressure checks. Please follow the below link to schedule an appointment. Scheduling an appointment at the pharmacy for a blood pressure check is now preferred.    Appointment Plus " ([email or web address])  _________________________________________________________________________________________________________________________________________________________  Important contact and scheduling information:  Please call our contact center at [phone] to schedule your next appointments.  To find a lab location near you, please call [phone].  For any nursing questions or concerns call Cortney White LPN at [phone] or Savi Viera RN at [phone]  Please call during clinic hours Monday through Friday 8:00a - 4:00p if you have questions or you can contact us via [employer] at anytime and we will reply during clinic hours.    Lab results will be communicated through My Chart or letter (if My Chart not used). Please call the clinic if you have not received communication after 1 week or if you have any questions.?  Clinic Fax: [phone]    Work with A Health !  Virtual Sessions are Available through [facility] Weight Management Clinics    To learn more, call to schedule an appointment: [phone]     What is Health Coaching?  Do you know what you are supposed to do, but you just aren't doing it?  Then, HEALTH COACHING may help you!   Get unstuck and move forward with the support of a professionally trained NBC-HWC (National Board-Certified Health and ) who uses evidence-based approaches to help you move forward with healthy lifestyle changes in the areas of weight loss, stress management and overall well-being.    Health Coaches help you identify goals that will work best for you. Health Coaches provide support and encouragement with overcoming barriers and help you to find inspiration and motivation to lead a healthy lifestyle.    Health Coaching 3-Pack; Three, 30-minute Health Coaching Visits, for $135  Health Coaching 6-Pack; Six, 30-minute Health coaching visits, for $250  Visits are done virtually (phone or video)  This is a self pay service; we do not  accept insurance for bobby coaching.    ____________________________________________________________________  [facility]  Healthy Lifestyle Group    Healthy Lifestyle Group  This is a 60 minute virtual coaching group for those who want to lead a healthier lifestyle. Come together to set goals and overcome barriers in a supportive group environment. We will address the four pillars of health--nutrition, exercise, sleep and emotional well-being.  This group is highly recommended for those who are participating in the 24 week Healthy Lifestyle Plan and our Health Coaching sessions.    WHEN: This group meets the first Friday of the month, 12:30 PM - 1:30 PM online, via a zoom meeting.      FACILITATOR: Led by National Board Certified Health and , Leslie Collins [facility].    TO REGISTER: Please call the Call Center at [phone] to register. You will get an appointment to attend in [facility]. Fifteen minutes prior to the meeting, complete the e-check in and you will get the link to join the meeting.  There is no charge to attend this group and space is limited.      _________________________________________________________________________________________________________________________________________________________  __________  [place] [facility] Get Moving Program  Our team of physical therapists is trained to help you understand and take control of your condition. They will perform a thorough evaluation to determine your ability for activity and develop a customized plan to fit your goals and physical ability.  Scheduling: Unsure if the Get Moving program is right for you? Discuss the program with your medical provider or diabetes educator. You can also call us at [phone] to ask questions or schedule an appointment.   МАРИНА Get Moving  Program  ____________________________________________________________________________________________________________________________________________________________________________        Thank you,   [facility] Comprehensive Weight Management Team

## 2025-07-08 NOTE — NURSING NOTE
Current patient location: [address] [place] [address]    Is the patient currently in the state of [place]? YES    Visit mode: VIDEO    If the visit is dropped, the patient can be reconnected by:VIDEO VISIT: Text to cell phone:   Telephone Information:   Mobile [phone]    and VIDEO VISIT: Send to e-mail at: [email or web address]    Will anyone else be joining the visit? NO  (If patient encounters technical issues they should call [phone]    Are changes needed to the allergy or medication list? No    Are refills needed on medications prescribed by this physician? NO    Rooming Documentation:  Questionnaire(s) completed    Reason for visit: RECHUSMAN LEWIS

## 2025-07-08 NOTE — LETTER
2025       RE: Fatmata Catherine  [address] [place] [address]     Dear Colleague,    Thank you for referring your patient, Fatmata Catherine, to the [facility] [place] at [facility]. Please see a copy of my visit note below.    Virtual Visit Details    Type of service:  Video Visit   Video Start Time: 4:05 PM  Video End Time:4:20pm    Originating Location (pt. Location): Home    Distant Location (provider location):  Off-site  Platform used for Video Visit: [facility] Note     Fatmata Catherine  MRN:  [id number]  :  1995  VAISHALI:  2025    Dear Physician No Ref-Primary,    I had the pleasure of seeing your patient Fatmata Catherine. She is a 30 year old female who I am continuing to see for treatment of obesity related to:        10/5/2022     9:59 AM   --   I have the following health issues associated with obesity High Blood Pressure    Stress Incontinence   I have the following symptoms associated with obesity Depression    Back Pain    Fatigue    Irregular Menstral Cycle       Assessment & Plan  Problem List Items Addressed This Visit    None  Visit Diagnoses         Class 3 severe obesity with serious comorbidity and body mass index (BMI) of 40.0 to 44.9 in adult, unspecified obesity type (H)    -  Primary    Relevant Orders    Adult Plastic Surgery  Referral           Plan  Continue zepbound 10mg, okay to trial reduction to 7.5mg if wanting to see if this is a good maintenance dose   Goals we discussed today:   Continue to prioritize protein at every meal to help with building muscle   Explore variety of weight training.   Labs ordered at previous visit- please get these done when you are able   Referral placed to plastic surgery given loose skin concerns, 30% total body weight loss since starting with our program  Follow up with Anya in 3 months   Dietician  appointment as needed  Keep up the excellent work!         INTERVAL HISTORY:  First seen for New MWM - 10/5/2022 by Cristal Timmons    Started Saxenda, lost 15lbs, last seen 3/1/2023. Saw good weight loss with wegovy.   Became pregnant shortly after, stopped wegovy.      New to me 24  Had baby 2 weeks ago via , healthy baby boy, both mom and baby are doing well. Is interested in restarting weight loss medications, is really hoping to restart wegovy as she saw the most success from this medication and felt that while taking it she was able to sustainably lose weight for the first time in her life.      Not breast feeding at all, expressed understanding that these medications are not safe to be on while breastfeeding.       Seen by me 24  -started zepbound, stopped due to supply issues  -switched to wegovy, insurance denied, requiring 2 visits with weight management over the last 6 months  -starting appeal for wegovy today (24)    ED 24- dysuria, started keflex 500mg Bid x 7 days      Saw 15lbs weight loss since last visit, though hasn't weighed herself since stopping zepbound.      Just picked up zepbound 5mg, hasn't started it yet. Discussed the importance of restarting zepbound 2.5mg for 4 weeks first to reduce risk for side effects.      -has since increased to zepbound 5mg      Seen by me 24  20lbs weight loss since last visit      Improved energy with weight loss, feeling good with this weight loss.      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 This has been a stressful change but also helpful for her as she is now a lot more active in taking him to walls every day.       A couple of ED visits over the last month due to UTI concerns and some headache concerns. Doing her best to stay up with hydration, wonders if the UTI issues are related to a recent change in body.   -continued  zepbound      11/21/24- ED visit for worsened agitation, emotional outbursts, had not been taking her psych meds for the previous 3 weeks. Admitted to psych hospital   Excerpt from psych admission:   However, she stopped taking her medications 2 months ago, and states that she can not explain why she misses them but states that it is mostly due to forgetting them.     Patient states that has had more stress in her household with increase conflicts with her , which she describes as her biggest stressor. She feels that has lead to a worsening in her mood and increase in her irritability. She is also overwhelmed with parenting. She is on a leave of absence from her work.     Patient presented to ER complaining that she felt that she could no longer control her anger and was feeling unsafe believing that she was having a bad impact on her children due to her volatility.     Last seen by me 1/15/25   30lbs weight loss over the last 4 months- down 65lbs over the last year.   Hasn't been taking zepbound for the last 2 weeks as she's been down with a cough.   Reports from a mental health perspective she's feeling stable, has been taking her anti-depressants regularly since this admission. Describes it was hard for her to remember to take all of these daily psych meds.      Feeling really good about her weight loss, seeing much more energy, mobility.   -continued zepbound 7.5mg   -increased via mychart to 10mg      Today in visit 7/8/25  Weight maintenance since last visit, down 55lbs since starting with program.   -reports she stopped taking her zepbound and mental health meds for about 1 month back in April 2025 due to worsened mental health, life stressors. Saw increased binge eating with stopping zepbound, believes she gained some weight in that time frame.     Restarted zepbound 10mg 1.5 months ago, denies side effects with restarting.       Currently feels mental health is better- just graduated DBT program.  Working regularly with therapist regularly.   -gout attack two weeks ago, started colchicine, indomethacin, prednisone. Symptoms have improved with treatment. Seems to be related by drinking.       Dealing with worsened rash in abdomin skin folds- has tried nystatin powder and creams, has not seen major benefits with these.     Some interest in tapering off zepbound or exploring lower doses to see if  helpful for maintenance, can consider this in the future. Discussed risk of weight regain with stopping or reducing dose.   Wt Readings from Last 5 Encounters:   07/08/25 60.8 kg (134 lb)   01/15/25 61.2 kg (135 lb)   11/21/24 68 kg (150 lb)   09/12/24 74.8 kg (165 lb)   05/21/24 83.9 kg (185 lb)       Anti-obesity medication history    Current:   Zepbound 10mg- continuing to tolerate, denies side effects. Helpful with reducing food noise.       Recent diet changes:   B: protein bar   L: whole wheat tortilla with breaded chicken breast and cheese   D: homemade tacos with rice and peas. OR chicken. Sometimes stuffed shell pasta.   Snack: protein bar if still hungry or going to the gym     Recent exercise/activity changes: stair master for 35 minutes, 20-30 minutes of elliptical, weight training after this. Getting to gym at least twice a week- goal of 4x weekly,    Vitamins/Labs: labs ordered at previous visit     Pregnancy: s/p tubal ligation     CURRENT WEIGHT:   134 lbs 0 oz    Initial Weight (lbs): 189 lbs  Last Visits Weight: 74.8 kg (165 lb)  Cumulative weight loss (lbs): 55  Weight Loss Percentage: 29.1%        7/8/2025     3:47 PM   Changes and Difficulties   I have made the following changes to my diet since my last visit: none   I have made the following changes to my activity/exercise since my last visit: none             MEDICATIONS:   Current Outpatient Medications   Medication Sig Dispense Refill     albuterol (PROAIR HFA/PROVENTIL HFA/VENTOLIN HFA) 108 (90 Base) MCG/ACT inhaler Inhale 2 puffs into the  "lungs every 6 hours as needed for shortness of breath, wheezing or cough.       buPROPion (WELLBUTRIN XL) 300 MG 24 hr tablet Take 450 mg by mouth every morning. Take 300 mg tablet along with 150 mg tablet for total daily dose of 450 mg.       FLUoxetine (PROZAC) 20 MG capsule Take 1 capsule (20 mg) by mouth daily. 30 capsule 1     LORazepam (ATIVAN) 0.5 MG tablet Take 0.5 mg by mouth daily as needed for anxiety or sleep.       methylphenidate (RITALIN LA) 20 MG 24 hr capsule Take 20 mg by mouth daily.       ondansetron (ZOFRAN) 4 MG tablet Take 4 mg by mouth every 8 hours as needed for nausea.       tirzepatide-Weight Management (ZEPBOUND) 10 MG/0.5ML prefilled pen Inject 0.5 mLs (10 mg) subcutaneously every 7 days. 2 mL 3     tirzepatide-Weight Management (ZEPBOUND) 7.5 MG/0.5ML prefilled pen Inject 0.5 mLs (7.5 mg) subcutaneously every 7 days. 2 mL 3     XOPENEX HFA 45 MCG/ACT inhaler Inhale 1-2 puffs into the lungs every 4 hours as needed for wheezing (Patient not taking: Reported on 7/8/2025)             7/8/2025     3:47 PM   Weight Loss Medication History Reviewed With Patient   Are you having any side effects from the weight loss medication that we have prescribed you? No                No data to display                  PHYSICAL EXAM:  Objective   Ht 1.499 m (4' 11.02\")   Wt 60.8 kg (134 lb)   BMI 27.05 kg/m      Vitals - Patient Reported  Pain Score: No Pain (0)      Vitals:  No vitals were obtained today due to virtual visit.    GENERAL: alert and no distress  EYES: Eyes grossly normal to inspection.  No discharge or erythema, or obvious scleral/conjunctival abnormalities.  RESP: No audible wheeze, cough, or visible cyanosis.    SKIN: Visible skin clear. No significant rash, abnormal pigmentation or lesions.  NEURO: Cranial nerves grossly intact.  Mentation and speech appropriate for age.  PSYCH: Appropriate affect, tone, and pace of words        Sincerely,    Anya Good PA-C      25 minutes " spent by me on the date of the encounter doing chart review, history and exam, documentation and further activities per the note    The longitudinal plan of care for the diagnosis(es)/condition(s) as documented were addressed during this visit. Due to the added complexity in care, I will continue to support Fatmata in the subsequent management and with ongoing continuity of care.     Again, thank you for allowing me to participate in the care of your patient.      Sincerely,    Anya Good PA-C

## 2025-07-09 ENCOUNTER — PATIENT OUTREACH (OUTPATIENT)
Dept: CARE COORDINATION | Facility: CLINIC | Age: 30
End: 2025-07-09
Payer: COMMERCIAL

## (undated) DEVICE — SUCTION MANIFOLD NEPTUNE 2 SYS 1 PORT 702-025-000

## (undated) DEVICE — BAG BIOHAZARD RED SMALL 24X23" A4823PR

## (undated) DEVICE — UNDERPAD 30X30 BULK 949B10

## (undated) DEVICE — GOWN IMPERVIOUS BREATHABLE SMART LG 89015

## (undated) DEVICE — SUTURE VICRYL+ 0 36IN CT-1 UND VCP946H

## (undated) DEVICE — SUTURE MONOCRYL+ 4-0 PS-2 27IN MCP426H

## (undated) DEVICE — SUCTION MITY VAC MUSHROOM CUP 10007LP

## (undated) DEVICE — SU DERMABOND ADVANCED .7ML DNX12

## (undated) DEVICE — GLOVE BIOGEL PI ULTRATOUCH G SZ 6.5 42165

## (undated) DEVICE — CUSTOM PACK C-SECTION LHE

## (undated) DEVICE — SOL WATER IRRIG 1000ML BOTTLE 2F7114

## (undated) DEVICE — PACK MINOR SINGLE BASIN SSK3001

## (undated) DEVICE — ESU LIGASURE OPEN SEALER/DIVIDER SM JAW 16.5MM LF1212A

## (undated) DEVICE — SOL NACL 0.9% IRRIG 1000ML BOTTLE 2F7124

## (undated) DEVICE — SUTURE MONOCRYL+ 0 CT-1 UND 18 MCP946H

## (undated) DEVICE — PREP CHLORAPREP 26ML TINTED HI-LITE ORANGE 930815

## (undated) DEVICE — SU PLAIN 0 TIE 54" S104H

## (undated) DEVICE — SPONGE LAP 18X18" X8435

## (undated) DEVICE — SUTURE PLAIN 2-0 CTX 872H

## (undated) DEVICE — ELECTRODE PATIENT RETURN ADULT L10 FT 2 PLATE CORD 0855C

## (undated) DEVICE — PAD INSERT MED PEACH 14X6.5 62550

## (undated) DEVICE — SUTURE VICRYL+ 3-0 27IN CT-1 UND VCP258H

## (undated) DEVICE — SURGICEL POWDER ABSORBABLE HEMOSTAT 3GM 3013SP

## (undated) RX ORDER — ONDANSETRON 2 MG/ML
INJECTION INTRAMUSCULAR; INTRAVENOUS
Status: DISPENSED
Start: 2024-01-12

## (undated) RX ORDER — LIDOCAINE HYDROCHLORIDE 10 MG/ML
INJECTION, SOLUTION EPIDURAL; INFILTRATION; INTRACAUDAL; PERINEURAL
Status: DISPENSED
Start: 2024-01-12

## (undated) RX ORDER — FENTANYL CITRATE 50 UG/ML
INJECTION, SOLUTION INTRAMUSCULAR; INTRAVENOUS
Status: DISPENSED
Start: 2024-01-12

## (undated) RX ORDER — PROPOFOL 10 MG/ML
INJECTION, EMULSION INTRAVENOUS
Status: DISPENSED
Start: 2024-01-12

## (undated) RX ORDER — GLYCOPYRROLATE 0.2 MG/ML
INJECTION, SOLUTION INTRAMUSCULAR; INTRAVENOUS
Status: DISPENSED
Start: 2024-01-12